# Patient Record
Sex: MALE | Race: WHITE | Employment: UNEMPLOYED | URBAN - METROPOLITAN AREA
[De-identification: names, ages, dates, MRNs, and addresses within clinical notes are randomized per-mention and may not be internally consistent; named-entity substitution may affect disease eponyms.]

---

## 2018-09-26 ENCOUNTER — OFFICE VISIT (OUTPATIENT)
Dept: FAMILY MEDICINE CLINIC | Age: 38
End: 2018-09-26
Payer: COMMERCIAL

## 2018-09-26 VITALS
DIASTOLIC BLOOD PRESSURE: 94 MMHG | HEART RATE: 108 BPM | TEMPERATURE: 98.4 F | OXYGEN SATURATION: 99 % | BODY MASS INDEX: 27.86 KG/M2 | WEIGHT: 194.6 LBS | SYSTOLIC BLOOD PRESSURE: 142 MMHG | HEIGHT: 70 IN

## 2018-09-26 DIAGNOSIS — G89.29 CHRONIC MIDLINE LOW BACK PAIN, WITH SCIATICA PRESENCE UNSPECIFIED: Primary | ICD-10-CM

## 2018-09-26 DIAGNOSIS — M54.5 CHRONIC MIDLINE LOW BACK PAIN, WITH SCIATICA PRESENCE UNSPECIFIED: Primary | ICD-10-CM

## 2018-09-26 DIAGNOSIS — R10.30 LOWER ABDOMINAL PAIN: ICD-10-CM

## 2018-09-26 DIAGNOSIS — Z20.2 STD EXPOSURE: ICD-10-CM

## 2018-09-26 LAB
A/G RATIO: 2 (ref 1.1–2.2)
ALBUMIN SERPL-MCNC: 4.9 G/DL (ref 3.4–5)
ALP BLD-CCNC: 85 U/L (ref 40–129)
ALT SERPL-CCNC: 40 U/L (ref 10–40)
ANION GAP SERPL CALCULATED.3IONS-SCNC: 13 MMOL/L (ref 3–16)
AST SERPL-CCNC: 27 U/L (ref 15–37)
BASOPHILS ABSOLUTE: 0 K/UL (ref 0–0.2)
BASOPHILS RELATIVE PERCENT: 0.3 %
BILIRUB SERPL-MCNC: 0.5 MG/DL (ref 0–1)
BUN BLDV-MCNC: 11 MG/DL (ref 7–20)
CALCIUM SERPL-MCNC: 10.1 MG/DL (ref 8.3–10.6)
CHLORIDE BLD-SCNC: 100 MMOL/L (ref 99–110)
CO2: 27 MMOL/L (ref 21–32)
CREAT SERPL-MCNC: 0.9 MG/DL (ref 0.9–1.3)
EOSINOPHILS ABSOLUTE: 0.2 K/UL (ref 0–0.6)
EOSINOPHILS RELATIVE PERCENT: 2.4 %
GFR AFRICAN AMERICAN: >60
GFR NON-AFRICAN AMERICAN: >60
GLOBULIN: 2.5 G/DL
GLUCOSE BLD-MCNC: 130 MG/DL (ref 70–99)
HCT VFR BLD CALC: 45.2 % (ref 40.5–52.5)
HEMOGLOBIN: 15.1 G/DL (ref 13.5–17.5)
HEPATITIS C ANTIBODY INTERPRETATION: NORMAL
LYMPHOCYTES ABSOLUTE: 1 K/UL (ref 1–5.1)
LYMPHOCYTES RELATIVE PERCENT: 12.5 %
MCH RBC QN AUTO: 31.6 PG (ref 26–34)
MCHC RBC AUTO-ENTMCNC: 33.5 G/DL (ref 31–36)
MCV RBC AUTO: 94.1 FL (ref 80–100)
MONOCYTES ABSOLUTE: 0.2 K/UL (ref 0–1.3)
MONOCYTES RELATIVE PERCENT: 2.5 %
NEUTROPHILS ABSOLUTE: 6.9 K/UL (ref 1.7–7.7)
NEUTROPHILS RELATIVE PERCENT: 82.3 %
PDW BLD-RTO: 12.7 % (ref 12.4–15.4)
PLATELET # BLD: 411 K/UL (ref 135–450)
PMV BLD AUTO: 7.3 FL (ref 5–10.5)
POTASSIUM SERPL-SCNC: 5 MMOL/L (ref 3.5–5.1)
RBC # BLD: 4.8 M/UL (ref 4.2–5.9)
SODIUM BLD-SCNC: 140 MMOL/L (ref 136–145)
TOTAL PROTEIN: 7.4 G/DL (ref 6.4–8.2)
TSH REFLEX: 0.49 UIU/ML (ref 0.27–4.2)
WBC # BLD: 8.3 K/UL (ref 4–11)

## 2018-09-26 PROCEDURE — G8419 CALC BMI OUT NRM PARAM NOF/U: HCPCS | Performed by: FAMILY MEDICINE

## 2018-09-26 PROCEDURE — 99203 OFFICE O/P NEW LOW 30 MIN: CPT | Performed by: FAMILY MEDICINE

## 2018-09-26 PROCEDURE — 4004F PT TOBACCO SCREEN RCVD TLK: CPT | Performed by: FAMILY MEDICINE

## 2018-09-26 PROCEDURE — 36415 COLL VENOUS BLD VENIPUNCTURE: CPT | Performed by: FAMILY MEDICINE

## 2018-09-26 PROCEDURE — G8427 DOCREV CUR MEDS BY ELIG CLIN: HCPCS | Performed by: FAMILY MEDICINE

## 2018-09-26 RX ORDER — ALBUTEROL SULFATE 90 UG/1
2 AEROSOL, METERED RESPIRATORY (INHALATION) EVERY 6 HOURS PRN
Qty: 1 INHALER | Refills: 3 | Status: SHIPPED | OUTPATIENT
Start: 2018-09-26 | End: 2019-01-05

## 2018-09-26 ASSESSMENT — PATIENT HEALTH QUESTIONNAIRE - PHQ9
2. FEELING DOWN, DEPRESSED OR HOPELESS: 0
SUM OF ALL RESPONSES TO PHQ9 QUESTIONS 1 & 2: 0
1. LITTLE INTEREST OR PLEASURE IN DOING THINGS: 0
SUM OF ALL RESPONSES TO PHQ QUESTIONS 1-9: 0
SUM OF ALL RESPONSES TO PHQ QUESTIONS 1-9: 0

## 2018-09-26 ASSESSMENT — ENCOUNTER SYMPTOMS: BACK PAIN: 1

## 2018-09-26 NOTE — PROGRESS NOTES
0     No current facility-administered medications for this visit. Allergies: Patient has no known allergies. Past Medical History:   Diagnosis Date    Asthma     Back injury     Hypertension      Past Surgical History:   Procedure Laterality Date    BACK SURGERY  2000    KNEE SURGERY  2001    right knee     Family History   Problem Relation Age of Onset    High Blood Pressure Mother     High Blood Pressure Father     Bipolar Disorder Father      Social History   Substance Use Topics    Smoking status: Current Some Day Smoker     Packs/day: 0.50     Years: 3.00     Types: Cigarettes    Smokeless tobacco: Never Used    Alcohol use No     Vitals:    09/26/18 1518 09/26/18 1520   BP: (!) 142/96 (!) 142/94   Pulse: 108    Temp: 98.4 °F (36.9 °C)    TempSrc: Oral    SpO2: 99%    Weight: 194 lb 9.6 oz (88.3 kg)    Height: 5' 10\" (1.778 m)      Body mass index is 27.92 kg/m². Wt Readings from Last 3 Encounters:   09/26/18 194 lb 9.6 oz (88.3 kg)   02/17/18 210 lb (95.3 kg)   09/01/17 200 lb (90.7 kg)     BP Readings from Last 3 Encounters:   09/26/18 (!) 142/94   02/17/18 129/89   09/01/17 138/87        Review of Systems   Constitutional: Negative for fatigue, fever and unexpected weight change. HENT: Negative for congestion, ear discharge, ear pain, hearing loss, rhinorrhea and sinus pressure. Eyes: Negative for pain, discharge and visual disturbance. Respiratory: Negative for cough, chest tightness, shortness of breath and wheezing. Cardiovascular: Negative for chest pain, palpitations and leg swelling. Gastrointestinal: Negative for abdominal pain, blood in stool, constipation, diarrhea and vomiting. Genitourinary: Negative for difficulty urinating, dysuria, genital sores and hematuria. Musculoskeletal: Positive for back pain and myalgias. Skin: Negative for color change and rash. Neurological: Negative for dizziness, seizures, syncope and headaches.    Psychiatric/Behavioral: Negative for dysphoric mood and sleep disturbance. The patient is not nervous/anxious. Objective:   Physical Exam   Constitutional: He is oriented to person, place, and time. He appears well-developed and well-nourished. He appears distressed (crying). HENT:   Head: Normocephalic. Right Ear: External ear normal.   Left Ear: External ear normal.   Nose: Nose normal.   Mouth/Throat: Oropharynx is clear and moist. No oropharyngeal exudate. Eyes: Pupils are equal, round, and reactive to light. Conjunctivae and EOM are normal. No scleral icterus. Neck: Neck supple. No thyromegaly present. Cardiovascular: Normal rate, regular rhythm, normal heart sounds and intact distal pulses. No murmur heard. Pulmonary/Chest: Effort normal and breath sounds normal. He has no wheezes. He has no rales. Abdominal: Soft. Bowel sounds are normal. He exhibits no distension. There is no tenderness. There is no rebound and no guarding. Musculoskeletal: Normal range of motion. He exhibits no edema, tenderness or deformity. Lymphadenopathy:     He has no cervical adenopathy. Neurological: He is alert and oriented to person, place, and time. No cranial nerve deficit. Coordination normal.   Skin: Skin is warm and dry. Psychiatric: Judgment and thought content normal. His mood appears anxious. His affect is not angry. He is agitated. He is not aggressive and not combative. He does not exhibit a depressed mood.        Assessment:      Chronic lumbar pain- xray and MRI  Genital herpes- discussed course of infection, will await to see if chronic outbreak  Lower abd pain - benign abd exam, check labs      Plan:       Orders Placed This Encounter   Procedures    XR LUMBAR SPINE (2-3 VIEWS)     Standing Status:   Future     Standing Expiration Date:   9/26/2019     Order Specific Question:   Reason for exam:     Answer:   low back pain    HIV-1 AND HIV-2 ANTIBODIES    HEPATITIS C ANTIBODY    COMPREHENSIVE METABOLIC PANEL  TSH with Reflex    CBC WITH AUTO DIFFERENTIAL    HEPATITIS B E ANTIGEN             JAKE Blanchard [address] JON, DO

## 2018-09-27 LAB
HIV AG/AB: NORMAL
HIV ANTIGEN: NORMAL
HIV-1 ANTIBODY: NORMAL
HIV-2 AB: NORMAL

## 2018-09-28 LAB — HEPATITIS BE ANTIGEN: NEGATIVE

## 2018-09-29 ASSESSMENT — ENCOUNTER SYMPTOMS
EYE PAIN: 0
WHEEZING: 0
SHORTNESS OF BREATH: 0
COLOR CHANGE: 0
DIARRHEA: 0
COUGH: 0
BLOOD IN STOOL: 0
ABDOMINAL PAIN: 0
CHEST TIGHTNESS: 0
VOMITING: 0
RHINORRHEA: 0
SINUS PRESSURE: 0
EYE DISCHARGE: 0
CONSTIPATION: 0

## 2018-10-02 ENCOUNTER — TELEPHONE (OUTPATIENT)
Dept: FAMILY MEDICINE CLINIC | Age: 38
End: 2018-10-02

## 2019-01-05 ENCOUNTER — HOSPITAL ENCOUNTER (EMERGENCY)
Age: 39
Discharge: HOME | End: 2019-01-05
Payer: COMMERCIAL

## 2019-01-05 VITALS
BODY MASS INDEX: 28 KG/M2 | TEMPERATURE: 98.7 F | SYSTOLIC BLOOD PRESSURE: 139 MMHG | HEIGHT: 71 IN | HEART RATE: 71 BPM | DIASTOLIC BLOOD PRESSURE: 80 MMHG | WEIGHT: 200 LBS | OXYGEN SATURATION: 97 % | RESPIRATION RATE: 16 BRPM

## 2019-01-05 DIAGNOSIS — M77.11 LATERAL EPICONDYLITIS, RIGHT ELBOW: ICD-10-CM

## 2019-01-05 PROCEDURE — 6370000000 HC RX 637 (ALT 250 FOR IP): Performed by: PHYSICIAN ASSISTANT

## 2019-01-05 PROCEDURE — 99282 EMERGENCY DEPT VISIT SF MDM: CPT

## 2019-01-05 RX ORDER — CEPHALEXIN 500 MG/1
500 CAPSULE ORAL 3 TIMES DAILY
Qty: 30 CAPSULE | Refills: 0 | Status: SHIPPED | OUTPATIENT
Start: 2019-01-05 | End: 2019-01-15

## 2019-01-05 RX ORDER — SULFAMETHOXAZOLE AND TRIMETHOPRIM 800; 160 MG/1; MG/1
1 TABLET ORAL 2 TIMES DAILY
Qty: 20 TABLET | Refills: 0 | Status: SHIPPED | OUTPATIENT
Start: 2019-01-05 | End: 2019-01-15

## 2019-01-05 RX ORDER — SULFAMETHOXAZOLE AND TRIMETHOPRIM 800; 160 MG/1; MG/1
1 TABLET ORAL ONCE
Status: COMPLETED | OUTPATIENT
Start: 2019-01-05 | End: 2019-01-05

## 2019-01-05 RX ORDER — PREDNISONE 10 MG/1
TABLET ORAL
Qty: 18 TABLET | Refills: 0 | Status: SHIPPED | OUTPATIENT
Start: 2019-01-05 | End: 2019-01-15

## 2019-01-05 RX ORDER — CEPHALEXIN 500 MG/1
500 CAPSULE ORAL ONCE
Status: COMPLETED | OUTPATIENT
Start: 2019-01-05 | End: 2019-01-05

## 2019-01-05 RX ADMIN — CEPHALEXIN 500 MG: 500 CAPSULE ORAL at 23:24

## 2019-01-05 RX ADMIN — SULFAMETHOXAZOLE AND TRIMETHOPRIM 1 TABLET: 800; 160 TABLET ORAL at 23:24

## 2019-01-05 ASSESSMENT — PAIN DESCRIPTION - PAIN TYPE: TYPE: ACUTE PAIN

## 2019-01-05 ASSESSMENT — PAIN SCALES - GENERAL: PAINLEVEL_OUTOF10: 7

## 2019-01-05 ASSESSMENT — PAIN DESCRIPTION - LOCATION: LOCATION: ABDOMEN

## 2019-01-20 ENCOUNTER — APPOINTMENT (OUTPATIENT)
Dept: CT IMAGING | Age: 39
End: 2019-01-20
Payer: COMMERCIAL

## 2019-01-20 ENCOUNTER — HOSPITAL ENCOUNTER (EMERGENCY)
Age: 39
Discharge: HOME | End: 2019-01-21
Payer: COMMERCIAL

## 2019-01-20 DIAGNOSIS — R31.9 HEMATURIA, UNSPECIFIED TYPE: ICD-10-CM

## 2019-01-20 DIAGNOSIS — M77.11 LATERAL EPICONDYLITIS OF RIGHT ELBOW: ICD-10-CM

## 2019-01-20 DIAGNOSIS — R10.9 ABDOMINAL PAIN, UNSPECIFIED ABDOMINAL LOCATION: Primary | ICD-10-CM

## 2019-01-20 DIAGNOSIS — K59.00 CONSTIPATION, UNSPECIFIED CONSTIPATION TYPE: ICD-10-CM

## 2019-01-20 LAB
A/G RATIO: 1.4 (ref 1.1–2.2)
ALBUMIN SERPL-MCNC: 4.3 G/DL (ref 3.4–5)
ALP BLD-CCNC: 60 U/L (ref 40–129)
ALT SERPL-CCNC: 22 U/L (ref 10–40)
AMYLASE: 55 U/L (ref 25–115)
ANION GAP SERPL CALCULATED.3IONS-SCNC: 10 MMOL/L (ref 3–16)
AST SERPL-CCNC: 20 U/L (ref 15–37)
BASOPHILS ABSOLUTE: 0.1 K/UL (ref 0–0.2)
BASOPHILS RELATIVE PERCENT: 1.4 %
BILIRUB SERPL-MCNC: <0.2 MG/DL (ref 0–1)
BILIRUBIN URINE: NEGATIVE
BLOOD, URINE: ABNORMAL
BUN BLDV-MCNC: 15 MG/DL (ref 7–20)
CALCIUM SERPL-MCNC: 10 MG/DL (ref 8.3–10.6)
CASTS 2: ABNORMAL /LPF
CHLORIDE BLD-SCNC: 98 MMOL/L (ref 99–110)
CLARITY: CLEAR
CO2: 29 MMOL/L (ref 21–32)
COLOR: YELLOW
CREAT SERPL-MCNC: 1 MG/DL (ref 0.9–1.3)
CRYSTALS, UA: ABNORMAL /HPF
EOSINOPHILS ABSOLUTE: 0.3 K/UL (ref 0–0.6)
EOSINOPHILS RELATIVE PERCENT: 3.4 %
GFR AFRICAN AMERICAN: >60
GFR NON-AFRICAN AMERICAN: >60
GLOBULIN: 3 G/DL
GLUCOSE BLD-MCNC: 106 MG/DL (ref 70–99)
GLUCOSE URINE: NEGATIVE MG/DL
HCT VFR BLD CALC: 41.9 % (ref 40.5–52.5)
HEMOGLOBIN: 14.4 G/DL (ref 13.5–17.5)
KETONES, URINE: NEGATIVE MG/DL
LEUKOCYTE ESTERASE, URINE: NEGATIVE
LIPASE: 23 U/L (ref 13–60)
LYMPHOCYTES ABSOLUTE: 2.6 K/UL (ref 1–5.1)
LYMPHOCYTES RELATIVE PERCENT: 33.5 %
MCH RBC QN AUTO: 31.7 PG (ref 26–34)
MCHC RBC AUTO-ENTMCNC: 34.5 G/DL (ref 31–36)
MCV RBC AUTO: 91.8 FL (ref 80–100)
MICROSCOPIC EXAMINATION: YES
MONOCYTES ABSOLUTE: 0.6 K/UL (ref 0–1.3)
MONOCYTES RELATIVE PERCENT: 7.2 %
MUCUS: ABNORMAL /LPF
NEUTROPHILS ABSOLUTE: 4.3 K/UL (ref 1.7–7.7)
NEUTROPHILS RELATIVE PERCENT: 54.5 %
NITRITE, URINE: NEGATIVE
PDW BLD-RTO: 13 % (ref 12.4–15.4)
PH UA: 5.5
PLATELET # BLD: 320 K/UL (ref 135–450)
PMV BLD AUTO: 6.7 FL (ref 5–10.5)
POTASSIUM SERPL-SCNC: 4 MMOL/L (ref 3.5–5.1)
PROTEIN UA: NEGATIVE MG/DL
RBC # BLD: 4.56 M/UL (ref 4.2–5.9)
RBC UA: ABNORMAL /HPF (ref 0–2)
SODIUM BLD-SCNC: 137 MMOL/L (ref 136–145)
SPECIFIC GRAVITY UA: >=1.03
TOTAL PROTEIN: 7.3 G/DL (ref 6.4–8.2)
URINE TYPE: ABNORMAL
UROBILINOGEN, URINE: 0.2 E.U./DL
WBC # BLD: 7.8 K/UL (ref 4–11)
WBC UA: ABNORMAL /HPF (ref 0–5)

## 2019-01-20 PROCEDURE — 85025 COMPLETE CBC W/AUTO DIFF WBC: CPT

## 2019-01-20 PROCEDURE — 83690 ASSAY OF LIPASE: CPT

## 2019-01-20 PROCEDURE — 96374 THER/PROPH/DIAG INJ IV PUSH: CPT

## 2019-01-20 PROCEDURE — 96361 HYDRATE IV INFUSION ADD-ON: CPT

## 2019-01-20 PROCEDURE — S0028 INJECTION, FAMOTIDINE, 20 MG: HCPCS | Performed by: PHYSICIAN ASSISTANT

## 2019-01-20 PROCEDURE — 2580000003 HC RX 258: Performed by: PHYSICIAN ASSISTANT

## 2019-01-20 PROCEDURE — 87491 CHLMYD TRACH DNA AMP PROBE: CPT

## 2019-01-20 PROCEDURE — 81001 URINALYSIS AUTO W/SCOPE: CPT

## 2019-01-20 PROCEDURE — 74176 CT ABD & PELVIS W/O CONTRAST: CPT

## 2019-01-20 PROCEDURE — 82150 ASSAY OF AMYLASE: CPT

## 2019-01-20 PROCEDURE — 99284 EMERGENCY DEPT VISIT MOD MDM: CPT

## 2019-01-20 PROCEDURE — 6360000002 HC RX W HCPCS: Performed by: PHYSICIAN ASSISTANT

## 2019-01-20 PROCEDURE — 80053 COMPREHEN METABOLIC PANEL: CPT

## 2019-01-20 PROCEDURE — 87591 N.GONORRHOEAE DNA AMP PROB: CPT

## 2019-01-20 PROCEDURE — 96375 TX/PRO/DX INJ NEW DRUG ADDON: CPT

## 2019-01-20 RX ORDER — ONDANSETRON 2 MG/ML
4 INJECTION INTRAMUSCULAR; INTRAVENOUS ONCE
Status: COMPLETED | OUTPATIENT
Start: 2019-01-20 | End: 2019-01-20

## 2019-01-20 RX ORDER — KETOROLAC TROMETHAMINE 30 MG/ML
30 INJECTION, SOLUTION INTRAMUSCULAR; INTRAVENOUS ONCE
Status: COMPLETED | OUTPATIENT
Start: 2019-01-20 | End: 2019-01-20

## 2019-01-20 RX ORDER — 0.9 % SODIUM CHLORIDE 0.9 %
1000 INTRAVENOUS SOLUTION INTRAVENOUS ONCE
Status: COMPLETED | OUTPATIENT
Start: 2019-01-20 | End: 2019-01-21

## 2019-01-20 RX ADMIN — KETOROLAC TROMETHAMINE 30 MG: 30 INJECTION, SOLUTION INTRAMUSCULAR; INTRAVENOUS at 23:42

## 2019-01-20 RX ADMIN — ONDANSETRON 4 MG: 2 INJECTION INTRAMUSCULAR; INTRAVENOUS at 23:42

## 2019-01-20 RX ADMIN — FAMOTIDINE 20 MG: 10 INJECTION, SOLUTION INTRAVENOUS at 23:42

## 2019-01-20 RX ADMIN — SODIUM CHLORIDE 1000 ML: 9 INJECTION, SOLUTION INTRAVENOUS at 23:42

## 2019-01-20 ASSESSMENT — PAIN SCALES - GENERAL
PAINLEVEL_OUTOF10: 8
PAINLEVEL_OUTOF10: 8

## 2019-01-21 VITALS
OXYGEN SATURATION: 97 % | RESPIRATION RATE: 18 BRPM | WEIGHT: 195 LBS | DIASTOLIC BLOOD PRESSURE: 67 MMHG | HEART RATE: 84 BPM | HEIGHT: 71 IN | BODY MASS INDEX: 27.3 KG/M2 | TEMPERATURE: 97.1 F | SYSTOLIC BLOOD PRESSURE: 106 MMHG

## 2019-01-21 RX ORDER — POLYETHYLENE GLYCOL 3350 17 G/17G
17 POWDER, FOR SOLUTION ORAL DAILY
Qty: 1 BOTTLE | Refills: 0 | Status: SHIPPED | OUTPATIENT
Start: 2019-01-21 | End: 2019-01-26

## 2019-01-21 RX ORDER — METHYLPREDNISOLONE 4 MG/1
TABLET ORAL
Qty: 1 KIT | Refills: 0 | Status: SHIPPED | OUTPATIENT
Start: 2019-01-21 | End: 2019-04-12

## 2019-01-21 ASSESSMENT — ENCOUNTER SYMPTOMS
EYES NEGATIVE: 1
BACK PAIN: 0
CHEST TIGHTNESS: 0
ABDOMINAL PAIN: 1
ANAL BLEEDING: 0
ALLERGIC/IMMUNOLOGIC NEGATIVE: 1
BLOOD IN STOOL: 0

## 2019-01-22 LAB
C. TRACHOMATIS DNA ,URINE: NEGATIVE
N. GONORRHOEAE DNA, URINE: NEGATIVE

## 2019-01-31 ENCOUNTER — OFFICE VISIT (OUTPATIENT)
Dept: FAMILY MEDICINE CLINIC | Age: 39
End: 2019-01-31
Payer: COMMERCIAL

## 2019-01-31 VITALS
HEIGHT: 71 IN | TEMPERATURE: 97.8 F | DIASTOLIC BLOOD PRESSURE: 82 MMHG | WEIGHT: 193.6 LBS | OXYGEN SATURATION: 98 % | HEART RATE: 86 BPM | SYSTOLIC BLOOD PRESSURE: 140 MMHG | RESPIRATION RATE: 16 BRPM | BODY MASS INDEX: 27.1 KG/M2

## 2019-01-31 DIAGNOSIS — B36.9 FUNGAL DERMATITIS: Primary | ICD-10-CM

## 2019-01-31 DIAGNOSIS — L73.9 FOLLICULITIS: ICD-10-CM

## 2019-01-31 DIAGNOSIS — F19.11 HISTORY OF MIXED DRUG ABUSE (HCC): ICD-10-CM

## 2019-01-31 PROCEDURE — 4004F PT TOBACCO SCREEN RCVD TLK: CPT | Performed by: PHYSICIAN ASSISTANT

## 2019-01-31 PROCEDURE — G8427 DOCREV CUR MEDS BY ELIG CLIN: HCPCS | Performed by: PHYSICIAN ASSISTANT

## 2019-01-31 PROCEDURE — 99213 OFFICE O/P EST LOW 20 MIN: CPT | Performed by: PHYSICIAN ASSISTANT

## 2019-01-31 PROCEDURE — G8484 FLU IMMUNIZE NO ADMIN: HCPCS | Performed by: PHYSICIAN ASSISTANT

## 2019-01-31 PROCEDURE — G8419 CALC BMI OUT NRM PARAM NOF/U: HCPCS | Performed by: PHYSICIAN ASSISTANT

## 2019-01-31 RX ORDER — CLOTRIMAZOLE 1 %
CREAM (GRAM) TOPICAL 2 TIMES DAILY
Qty: 60 G | Refills: 0 | Status: SHIPPED | OUTPATIENT
Start: 2019-01-31 | End: 2019-04-12

## 2019-04-12 ENCOUNTER — APPOINTMENT (OUTPATIENT)
Dept: GENERAL RADIOLOGY | Age: 39
DRG: 052 | End: 2019-04-12
Payer: COMMERCIAL

## 2019-04-12 ENCOUNTER — HOSPITAL ENCOUNTER (EMERGENCY)
Age: 39
Discharge: HOME | DRG: 052 | End: 2019-04-13
Attending: EMERGENCY MEDICINE
Payer: COMMERCIAL

## 2019-04-12 VITALS
BODY MASS INDEX: 27.02 KG/M2 | SYSTOLIC BLOOD PRESSURE: 142 MMHG | OXYGEN SATURATION: 98 % | WEIGHT: 193 LBS | HEART RATE: 86 BPM | HEIGHT: 71 IN | RESPIRATION RATE: 18 BRPM | DIASTOLIC BLOOD PRESSURE: 123 MMHG

## 2019-04-12 DIAGNOSIS — F41.1 ANXIETY STATE: Primary | ICD-10-CM

## 2019-04-12 LAB
A/G RATIO: 1.6 (ref 1.1–2.2)
ALBUMIN SERPL-MCNC: 4.6 G/DL (ref 3.4–5)
ALP BLD-CCNC: 73 U/L (ref 40–129)
ALT SERPL-CCNC: 33 U/L (ref 10–40)
AMORPHOUS: ABNORMAL /HPF
AMPHETAMINE SCREEN, URINE: ABNORMAL
ANION GAP SERPL CALCULATED.3IONS-SCNC: 14 MMOL/L (ref 3–16)
AST SERPL-CCNC: 24 U/L (ref 15–37)
BARBITURATE SCREEN URINE: POSITIVE
BASE EXCESS VENOUS: -0.3 MMOL/L (ref -3–3)
BASOPHILS ABSOLUTE: 0.1 K/UL (ref 0–0.2)
BASOPHILS RELATIVE PERCENT: 0.8 %
BENZODIAZEPINE SCREEN, URINE: ABNORMAL
BILIRUB SERPL-MCNC: 0.5 MG/DL (ref 0–1)
BILIRUBIN URINE: NEGATIVE
BLOOD, URINE: ABNORMAL
BUN BLDV-MCNC: 14 MG/DL (ref 7–20)
CALCIUM SERPL-MCNC: 9.9 MG/DL (ref 8.3–10.6)
CANNABINOID SCREEN URINE: POSITIVE
CARBOXYHEMOGLOBIN: 3.2 % (ref 0–1.5)
CHLORIDE BLD-SCNC: 96 MMOL/L (ref 99–110)
CLARITY: CLEAR
CO2: 25 MMOL/L (ref 21–32)
COCAINE METABOLITE SCREEN URINE: ABNORMAL
COLOR: YELLOW
CREAT SERPL-MCNC: 1.2 MG/DL (ref 0.9–1.3)
EOSINOPHILS ABSOLUTE: 0.1 K/UL (ref 0–0.6)
EOSINOPHILS RELATIVE PERCENT: 1.2 %
EPITHELIAL CELLS, UA: ABNORMAL /HPF
GFR AFRICAN AMERICAN: >60
GFR NON-AFRICAN AMERICAN: >60
GLOBULIN: 2.9 G/DL
GLUCOSE BLD-MCNC: 214 MG/DL (ref 70–99)
GLUCOSE URINE: NEGATIVE MG/DL
HCO3 VENOUS: 23.6 MMOL/L (ref 23–29)
HCT VFR BLD CALC: 41.4 % (ref 40.5–52.5)
HEMOGLOBIN: 14 G/DL (ref 13.5–17.5)
KETONES, URINE: NEGATIVE MG/DL
LACTIC ACID: 1.4 MMOL/L (ref 0.4–2)
LEUKOCYTE ESTERASE, URINE: NEGATIVE
LYMPHOCYTES ABSOLUTE: 4.6 K/UL (ref 1–5.1)
LYMPHOCYTES RELATIVE PERCENT: 39 %
Lab: ABNORMAL
MCH RBC QN AUTO: 30.9 PG (ref 26–34)
MCHC RBC AUTO-ENTMCNC: 33.9 G/DL (ref 31–36)
MCV RBC AUTO: 91.1 FL (ref 80–100)
METHADONE SCREEN, URINE: ABNORMAL
METHEMOGLOBIN VENOUS: 0.3 %
MICROSCOPIC EXAMINATION: YES
MONOCYTES ABSOLUTE: 0.7 K/UL (ref 0–1.3)
MONOCYTES RELATIVE PERCENT: 6.3 %
MUCUS: ABNORMAL /LPF
NEUTROPHILS ABSOLUTE: 6.2 K/UL (ref 1.7–7.7)
NEUTROPHILS RELATIVE PERCENT: 52.7 %
NITRITE, URINE: NEGATIVE
O2 CONTENT, VEN: 18 VOL %
O2 SAT, VEN: 97 %
O2 THERAPY: ABNORMAL
OPIATE SCREEN URINE: ABNORMAL
OXYCODONE URINE: ABNORMAL
PCO2, VEN: 36.6 MMHG (ref 40–50)
PDW BLD-RTO: 13.6 % (ref 12.4–15.4)
PH UA: 5.5
PH UA: 5.5 (ref 5–8)
PH VENOUS: 7.43 (ref 7.35–7.45)
PHENCYCLIDINE SCREEN URINE: ABNORMAL
PLATELET # BLD: 354 K/UL (ref 135–450)
PMV BLD AUTO: 6.8 FL (ref 5–10.5)
PO2, VEN: 92.9 MMHG (ref 25–40)
POTASSIUM SERPL-SCNC: 3.7 MMOL/L (ref 3.5–5.1)
PROPOXYPHENE SCREEN: ABNORMAL
PROTEIN UA: 100 MG/DL
RBC # BLD: 4.54 M/UL (ref 4.2–5.9)
RBC UA: ABNORMAL /HPF (ref 0–2)
SODIUM BLD-SCNC: 135 MMOL/L (ref 136–145)
SPECIFIC GRAVITY UA: >=1.03 (ref 1–1.03)
TCO2 CALC VENOUS: 25 MMOL/L
TOTAL PROTEIN: 7.5 G/DL (ref 6.4–8.2)
URINE REFLEX TO CULTURE: ABNORMAL
URINE TYPE: ABNORMAL
UROBILINOGEN, URINE: 0.2 E.U./DL
WBC # BLD: 11.8 K/UL (ref 4–11)
WBC UA: ABNORMAL /HPF (ref 0–5)

## 2019-04-12 PROCEDURE — 99284 EMERGENCY DEPT VISIT MOD MDM: CPT

## 2019-04-12 PROCEDURE — 82803 BLOOD GASES ANY COMBINATION: CPT

## 2019-04-12 PROCEDURE — 2580000003 HC RX 258: Performed by: EMERGENCY MEDICINE

## 2019-04-12 PROCEDURE — 96375 TX/PRO/DX INJ NEW DRUG ADDON: CPT

## 2019-04-12 PROCEDURE — 83605 ASSAY OF LACTIC ACID: CPT

## 2019-04-12 PROCEDURE — 96361 HYDRATE IV INFUSION ADD-ON: CPT

## 2019-04-12 PROCEDURE — 80053 COMPREHEN METABOLIC PANEL: CPT

## 2019-04-12 PROCEDURE — 96372 THER/PROPH/DIAG INJ SC/IM: CPT

## 2019-04-12 PROCEDURE — 96374 THER/PROPH/DIAG INJ IV PUSH: CPT

## 2019-04-12 PROCEDURE — 6360000002 HC RX W HCPCS: Performed by: EMERGENCY MEDICINE

## 2019-04-12 PROCEDURE — 85025 COMPLETE CBC W/AUTO DIFF WBC: CPT

## 2019-04-12 PROCEDURE — 81001 URINALYSIS AUTO W/SCOPE: CPT

## 2019-04-12 PROCEDURE — 71045 X-RAY EXAM CHEST 1 VIEW: CPT

## 2019-04-12 PROCEDURE — 80307 DRUG TEST PRSMV CHEM ANLYZR: CPT

## 2019-04-12 RX ORDER — DIPHENHYDRAMINE HYDROCHLORIDE 50 MG/ML
50 INJECTION INTRAMUSCULAR; INTRAVENOUS ONCE
Status: COMPLETED | OUTPATIENT
Start: 2019-04-12 | End: 2019-04-12

## 2019-04-12 RX ORDER — 0.9 % SODIUM CHLORIDE 0.9 %
1000 INTRAVENOUS SOLUTION INTRAVENOUS ONCE
Status: COMPLETED | OUTPATIENT
Start: 2019-04-12 | End: 2019-04-12

## 2019-04-12 RX ORDER — HALOPERIDOL 5 MG/ML
10 INJECTION INTRAMUSCULAR ONCE
Status: COMPLETED | OUTPATIENT
Start: 2019-04-12 | End: 2019-04-12

## 2019-04-12 RX ORDER — HALOPERIDOL 5 MG/ML
10 INJECTION INTRAMUSCULAR ONCE
Status: DISCONTINUED | OUTPATIENT
Start: 2019-04-12 | End: 2019-04-12

## 2019-04-12 RX ORDER — LORAZEPAM 2 MG/ML
1 INJECTION INTRAMUSCULAR ONCE
Status: COMPLETED | OUTPATIENT
Start: 2019-04-12 | End: 2019-04-12

## 2019-04-12 RX ADMIN — LORAZEPAM 1 MG: 2 INJECTION INTRAMUSCULAR; INTRAVENOUS at 21:36

## 2019-04-12 RX ADMIN — DIPHENHYDRAMINE HYDROCHLORIDE 50 MG: 50 INJECTION, SOLUTION INTRAMUSCULAR; INTRAVENOUS at 21:36

## 2019-04-12 RX ADMIN — SODIUM CHLORIDE 1000 ML: 9 INJECTION, SOLUTION INTRAVENOUS at 21:36

## 2019-04-12 RX ADMIN — HALOPERIDOL LACTATE 10 MG: 5 INJECTION, SOLUTION INTRAMUSCULAR at 21:36

## 2019-04-13 VITALS
OXYGEN SATURATION: 96 % | RESPIRATION RATE: 15 BRPM | TEMPERATURE: 97.7 F | HEART RATE: 88 BPM | DIASTOLIC BLOOD PRESSURE: 89 MMHG | BODY MASS INDEX: 25.9 KG/M2 | WEIGHT: 185 LBS | SYSTOLIC BLOOD PRESSURE: 144 MMHG | HEIGHT: 71 IN

## 2019-04-13 DIAGNOSIS — F32.A DEPRESSION, UNSPECIFIED DEPRESSION TYPE: Primary | ICD-10-CM

## 2019-04-13 LAB
AMPHETAMINE SCREEN, URINE: ABNORMAL
BARBITURATE SCREEN URINE: ABNORMAL
BENZODIAZEPINE SCREEN, URINE: ABNORMAL
BILIRUBIN URINE: NEGATIVE
BLOOD, URINE: ABNORMAL
CANNABINOID SCREEN URINE: POSITIVE
CLARITY: CLEAR
COCAINE METABOLITE SCREEN URINE: ABNORMAL
COLOR: YELLOW
GLUCOSE URINE: NEGATIVE MG/DL
KETONES, URINE: NEGATIVE MG/DL
LEUKOCYTE ESTERASE, URINE: NEGATIVE
Lab: ABNORMAL
METHADONE SCREEN, URINE: ABNORMAL
MICROSCOPIC EXAMINATION: YES
NITRITE, URINE: NEGATIVE
OPIATE SCREEN URINE: ABNORMAL
OXYCODONE URINE: ABNORMAL
PH UA: 5.5
PH UA: 5.5 (ref 5–8)
PHENCYCLIDINE SCREEN URINE: ABNORMAL
PROPOXYPHENE SCREEN: ABNORMAL
PROTEIN UA: NEGATIVE MG/DL
RBC UA: ABNORMAL /HPF (ref 0–2)
SPECIFIC GRAVITY UA: 1.01 (ref 1–1.03)
URINE REFLEX TO CULTURE: ABNORMAL
URINE TYPE: ABNORMAL
UROBILINOGEN, URINE: 0.2 E.U./DL
WBC UA: ABNORMAL /HPF (ref 0–5)

## 2019-04-13 PROCEDURE — 99284 EMERGENCY DEPT VISIT MOD MDM: CPT

## 2019-04-13 PROCEDURE — 80307 DRUG TEST PRSMV CHEM ANLYZR: CPT

## 2019-04-13 PROCEDURE — 6370000000 HC RX 637 (ALT 250 FOR IP): Performed by: EMERGENCY MEDICINE

## 2019-04-13 PROCEDURE — 81001 URINALYSIS AUTO W/SCOPE: CPT

## 2019-04-13 RX ORDER — HYDROXYZINE 50 MG/1
50 TABLET, FILM COATED ORAL 3 TIMES DAILY PRN
Qty: 20 TABLET | Refills: 0 | Status: ON HOLD | OUTPATIENT
Start: 2019-04-13 | End: 2019-04-27 | Stop reason: HOSPADM

## 2019-04-13 RX ORDER — LORAZEPAM 1 MG/1
1 TABLET ORAL ONCE
Status: COMPLETED | OUTPATIENT
Start: 2019-04-13 | End: 2019-04-13

## 2019-04-13 RX ORDER — TEMAZEPAM 15 MG/1
15 CAPSULE ORAL NIGHTLY PRN
Qty: 7 CAPSULE | Refills: 0 | Status: ON HOLD | OUTPATIENT
Start: 2019-04-13 | End: 2019-04-27 | Stop reason: HOSPADM

## 2019-04-13 RX ADMIN — LORAZEPAM 1 MG: 1 TABLET ORAL at 16:10

## 2019-04-13 ASSESSMENT — ENCOUNTER SYMPTOMS
ABDOMINAL PAIN: 1
DIARRHEA: 0
TROUBLE SWALLOWING: 1
APNEA: 0
CHEST TIGHTNESS: 0
VOMITING: 0
COUGH: 1
EYES NEGATIVE: 1
NAUSEA: 0

## 2019-04-13 ASSESSMENT — PAIN DESCRIPTION - LOCATION: LOCATION: LEG

## 2019-04-13 ASSESSMENT — PAIN DESCRIPTION - PAIN TYPE: TYPE: ACUTE PAIN

## 2019-04-13 ASSESSMENT — PAIN DESCRIPTION - DESCRIPTORS: DESCRIPTORS: SHOOTING

## 2019-04-13 ASSESSMENT — PAIN DESCRIPTION - ORIENTATION: ORIENTATION: LEFT;RIGHT

## 2019-04-13 NOTE — ED PROVIDER NOTES
Frequency: 3.0 times per week     Types: Marijuana     Comment: 3 times per week at least    Sexual activity: Yes     Partners: Female   Lifestyle    Physical activity:     Days per week: Not on file     Minutes per session: Not on file    Stress: Not on file   Relationships    Social connections:     Talks on phone: Not on file     Gets together: Not on file     Attends Advent service: Not on file     Active member of club or organization: Not on file     Attends meetings of clubs or organizations: Not on file     Relationship status: Not on file    Intimate partner violence:     Fear of current or ex partner: Not on file     Emotionally abused: Not on file     Physically abused: Not on file     Forced sexual activity: Not on file   Other Topics Concern    Not on file   Social History Narrative    Not on file     No current facility-administered medications for this encounter. No current outpatient medications on file. No Known Allergies    Nursing Notes Reviewed    Physical Exam:  ED Triage Vitals [04/12/19 2125]   BP Temp Temp src Pulse Resp SpO2 Height Weight   (!) 151/127 -- -- 154 26 100 % 5' 11\" (1.803 m) 193 lb (87.5 kg)     GENERAL APPEARANCE: Awake and alert. Agitated. Moderate acute distress. HEAD:Normocephalic. Atraumatic. EYES: EOM's grossly intact. Sclera anicteric. ENT: Mucous membranes are moist. Tolerates saliva. No trismus. NECK: Supple. No meningismus. Trachea midline. HEART: RRR. LUNGS: Respirations unlabored. CTAB  ABDOMEN: Soft. Non-tender. No guarding or rebound. EXTREMITIES: No acute deformities. SKIN: Warm and dry. NEUROLOGICAL: No gross facial drooping. Moves all 4 extremities spontaneously.   PSYCHIATRIC: Anxiety  Physical Exam    I have reviewed and interpreted all of the currently availablelab results from this visit (if applicable):  Results for orders placed or performed during the hospital encounter of 04/12/19   CBC Auto Differential   Result Value Ref Barbiturate Screen, Ur POSITIVE (A) Negative <200 ng/mL    Benzodiazepine Screen, Urine Neg Negative <200 ng/mL    Cannabinoid Scrn, Ur POSITIVE (A) Negative <50 ng/mL    Cocaine Metabolite Screen, Urine Neg Negative <300 ng/mL    Opiate Scrn, Ur Neg Negative <300 ng/mL    PCP Screen, Urine Neg Negative <25 ng/mL    Methadone Screen, Urine Neg Negative <300 ng/mL    Propoxyphene Scrn, Ur Neg Negative <300 ng/mL    pH, UA 5.5     Drug Screen Comment: see below     Oxycodone Urine Neg Negative <100 ng/ml   Microscopic Urinalysis   Result Value Ref Range    Mucus, UA 3+ (A) /LPF    WBC, UA 0-2 0 - 5 /HPF    RBC, UA 20-50 (A) 0 - 2 /HPF    Epi Cells 0-2 /HPF    Amorphous, UA 2+ (A) /HPF   Lactic Acid, Plasma   Result Value Ref Range    Lactic Acid 1.4 0.4 - 2.0 mmol/L   Blood gas, venous   Result Value Ref Range    pH, Zeus 7.428 7.350 - 7.450    pCO2, Zeus 36.6 (L) 40.0 - 50.0 mmHg    pO2, Zeus 92.9 (H) 25.0 - 40.0 mmHg    HCO3, Venous 23.6 23.0 - 29.0 mmol/L    Base Excess, Zeus -0.3 -3.0 - 3.0 mmol/L    O2 Sat, Zeus 97 Not Established %    Carboxyhemoglobin 3.2 (H) 0.0 - 1.5 %    MetHgb, Zeus 0.3 <1.5 %    TC02 (Calc), Zeus 25 Not Established mmol/L    O2 Content, Zeus 18 Not Established VOL %    O2 Therapy Unknown         Radiographs (if obtained):  [] The following radiograph was interpreted by myself in the absence of a radiologist:  [x] Radiologist's Report Reviewed:  XR CHEST PORTABLE   Final Result   Negative chest radiograph. Procedures    MDM:  After my initial evaluation, the patient will have blood work drawn and sent. The patient is very agitated, we're going to give medications to help calm him down. Patient received his medications, and soon afterward started to feel much better. The patient was much calmer and I was able to discuss his presenting complaints. The patient's workup has shown no signs of any systemic problems that could be causing his symptoms.   The patient's medical workup was deemed to be normal, and at this point I feel he can be safely cleared from medical standpoint. The patient was evaluated by psychiatry, and they don't feel that he is going to require any admission present time, the patient started on medications to help with the symptoms at home. The patient was advised to stay away from marijuana. Clinical Impression:  1.  Anxiety state      (Please note that portions of this note Osei Shadeet been completed with a voice recognition program. Efforts were made to edit the dictations but occasionally words are mis-transcribed.)    MD Laverne Barnes MD  04/13/19 [id number]

## 2019-04-13 NOTE — ED NOTES
Presenting Problem: Anxiety    Appearance/Hygiene:  street clothes, fair grooming and fair hygiene   Motor Behavior: wnl   Attitude: cooperative  Affect: anxiety   Speech: normal pitch and normal volume  Mood: anxious   Thought Processes: [place] and Logical  Perceptions: Absent - does not appear to be responding to internal stimuli  Thought content: states he kicked in a window and believes he inhaled radha molecules which is making him anxious and sob with chest pressure, no other delusions   Suicidal ideation:  no specific plan to harm self - denies  Homicidal ideation:  None- denies  Orientation: A&Ox4   Memory: intact  Concentration: Fair    Insight/ judgement: normal insight and judgment      Psychosocial and contextual factors: lack of sleep, just left rehab a couple days ago    C-SSRS Summary (including current and past suicidal ideation, plan, intent, and attempts) : no current thoughts or past attempts of suicide    Psychiatric History: none    Patient reported diagnosis : denies    Outpatient services/ Provider: none    Previous Inpatient Admissions( including location and dates if known): none    Self-injurious/ Self-harm behavior: denies    History of violence: denies    Current Substance use: denies current use, last used Fentanyl and marijuana about 6 days ago, drug screen negative     Trauma identified: none    Access to Firearms: none    ASSESSMENT FOR IMMINENT FUTURE DANGER:      RISK FACTORS:    []  Age <25 or >55   [x]  Male gender   []  Depressed mood   []  Active suicidal ideation   []  Suicide plan   []  Suicide attempt   []  Access to lethal means   []  Prior suicide attempt   []  Active substance abuse   []  Highly impulsive behaviors   []  Not attending to self-care/ADLs    []  Recent significant loss   []  Chronic pain or medical illness   []  Social isolation   []  History of violence   []  Active psychosis   []  Cognitive impairment    [x]  No outpatient services in place   [] Medication noncompliance   []  No collateral information to support safety   [x]  Lack of sleep  PROTECTIVE FACTORS:  [x] Age >25 and <55  [] Female gender   [x] Denies depression  [x] Denies suicidal ideation  [x] Does not have lethal plan   [x] Does not have access to guns or weapons  [x] Patient is verbally letty for safety  [x] No prior suicide attempts  [x] No active substance abuse- reports been clean x 6 days drug screen negative  [x] Patient has social or family support  [x] No active psychosis or cognitive dysfunction  [x] Physically healthy  [] Has outpatient services in place  [] Compliant with recommended medications  [] Collateral information from  supports patient safety   [] Patient is future oriented with plans to get sleep and follow up with outpatient substance abuse treatment  []        Clinical Summary:    Patient presents to ED voluntarily. Patient brought self in due to anxiety and SOB. Reports he was here last night for the same thing. Reports he was in rehab at [facility] for Fentanyl withdrawal and was discharged due to insurance purposes. Reports he has not been able to sleep much. Reports he kicked in a window and inhaled rust and molecules from the window believing it to have caused SOB and chest pressure as well as anxiety. Patient reports he is fearful about his health and feels if he could just sleep for a while and be monitored he would feel better. Reports he does not feel he needs mental health treatment. Patient was brought in last night by his mother for bizarre behaviors and same thought process. Patient at that time was having same thought content about breathing in chemicals. Patient alert and oriented x 4. Unable to determine if this is a delusion. Patient has no other delusional thoughts. Patient does not appear to be responding to internal stimuli. Patient appears to be anxious. Patient shaky. Patient denies any possibility of withdrawal from alcohol or benzo's.

## 2019-04-13 NOTE — ED NOTES
Discharge instructions reviewed with Mr. Naga Mike. He verbalized understanding. Copy of discharge instructions and prescriptions given. Mr. Naga Mike was discharged to home in good condition per personal vehicle, friend/family driving. He exited the ED without difficulty.         Claudell Bard, RN  04/13/19 [id number]

## 2019-04-13 NOTE — ED NOTES
Presenting Problem: Bizarre behavior    Appearance/Hygiene:  well-appearing, street clothes, lying in bed, fair grooming and fair hygiene   Motor Behavior: WNL   Attitude: cooperative  Affect: normal affect   Speech: normal pitch and normal volume  Mood: within normal limits   Thought Processes: Illogical- believes that he inhaled \"a bunch of rust and chemicals\" that are going to kill him  Perceptions: Absent   Thought content:  delusions- believes that he inhaled \"a bunch of rust and chemicals\" that are going to kill him     Suicidal ideation:  no specific plan to harm self   Homicidal ideation:  none  Orientation: A&Ox4   Memory: intact  Concentration: Good    Insight/ judgement: impaired judgment      Psychosocial and contextual factors: Went into the garage and got exposed to Dayanara Jemison and chemicals\"    C-SSRS Summary (including current and past suicidal ideation, plan, intent, and attempts) : CURRENT: Denies  PAST: Denies    Psychiatric History: None    Patient reported diagnosis None    Outpatient services/ Provider: None    Previous Inpatient Admissions( including location and dates if known): None    Self-injurious/ Self-harm behavior: None    History of violence: None    Current Substance use: Marijuana, just discharged from detox yesterday off fentanyl     Trauma identified: None    Access to Firearms: None    ASSESSMENT FOR IMMINENT FUTURE DANGER:      RISK FACTORS:    []  Age <25 or >49   []  Male gender   []  Depressed mood   []  Active suicidal ideation   []  Suicide plan   []  Suicide attempt   []  Access to lethal means   []  Prior suicide attempt   []  Active substance abuse   []  Highly impulsive behaviors   []  Not attending to self-care/ADLs    []  Recent significant loss   []  Chronic pain or medical illness   []  Social isolation   []  History of violence   []  Active psychosis   []  Cognitive impairment    []  No outpatient services in place   []  Medication noncompliance   []  No collateral information to support safety   []  Other    PROTECTIVE FACTORS:  [] Age >25 and <55  [] Female gender   [] Denies depression  [] Denies suicidal ideation  [] Does not have lethal plan   [] Does not have access to guns or weapons  [] Patient is verbally letty for safety  [] No prior suicide attempts  [] No active substance abuse  [] Patient has social or family support  [] No active psychosis or cognitive dysfunction  [] Physically healthy  [] Has outpatient services in place  [] Compliant with recommended medications  [] Collateral information from Mother supports patient safety   [] Patient is future oriented with plans to N/A  [] Other       Clinical Summary:    Patient presents to [facility] ED voluntarily after his mother brought patient in because of his bizarre behavior. Patient was clinically sober at the time of the evaluation. Patient was evaluated and offered supportive counseling. Patient states that he doesn't want to die, and is glad that we were able to help him. States that he isn't suicidal and he isn't homicidal.  States that he just got released from detox off fentanyl. States he can be safe going home.         Wesley Dejesus RN  04/13/19 0040

## 2019-04-13 NOTE — ED NOTES
Level of Care Disposition: Discharge      Patient was seen by ED provider and [facility] staff. The case was presented to psychiatric provider on-call Dr. Amanda SANTILLAN  Based on the ED evaluation and information presented to the provider by this nurse the decision was made to discharge patient with the following referrals: substance abuse programs. RATIONALE FOR NON-ADMISSION:  The patient does not meet criteria for an involuntary psychiatric admission because is not presenting an imminent risk to self or others and has a plan to follow-up with outpatient substance abuse treatment. Patient has demonstrated a reasonable safety plan . Dr. Amanda SANTILLAN recommends to discharge with Vistaril.             Tamika Wesley RN  04/13/19 1091

## 2019-04-13 NOTE — ED PROVIDER NOTES
Triage Chief Complaint:    Depression (Pt c/o depression that started 2 years ago and got worse within the last 2 days, Pt denies thoughts of suicide at this time. Pt also c/o abscess to the left side of the back. )    [place]:  Isaias Hayes is a 45 y.o. male that presents to emergency Department with complaints of being depressed. Patient states that he is having difficulty with sleeping. Patient states that he does not want to die, but is scared to go to sleep at times as well. Patient was seen last night, and had been diagnosed with significant anxiety. Patient is 2 days out from a rehab facility, after detoxing from fentanyl. Nursing Notes Reviewed    Physical Exam:  ED Triage Vitals [04/13/19 1355]   BP Temp Temp Source Pulse Resp SpO2 Height Weight   (!) 161/112 97.7 °F (36.5 °C) Oral 119 17 97 % 5' 11\" (1.803 m) 185 lb (83.9 kg)     GENERAL APPEARANCE: Awake and alert. Anxious. No acute distress. HEAD:Normocephalic. Atraumatic. EYES: EOM's grossly intact. Sclera anicteric. ENT: Mucous membranes are moist. Tolerates saliva. No trismus. NECK: Supple. No meningismus. Trachea midline. HEART: RRR. LUNGS: Respirations unlabored. CTAB  ABDOMEN: Soft. Non-tender. No guarding or rebound. EXTREMITIES: No acute deformities. SKIN: Warm and dry. NEUROLOGICAL: No gross facial drooping. Moves all 4 extremities spontaneously. PSYCHIATRIC: Depressed and anxious.   Physical Exam    I have reviewed and interpreted all of the currently availablelab results from this visit (if applicable):  Results for orders placed or performed during the hospital encounter of 04/13/19   Urinalysis Reflex to Culture   Result Value Ref Range    Color, UA Yellow Straw/Yellow    Clarity, UA Clear Clear    Glucose, Ur Negative Negative mg/dL    Bilirubin Urine Negative Negative    Ketones, Urine Negative Negative mg/dL    Specific Gravity, UA 1.010 1.005 - 1.030    Blood, Urine SMALL (A) Negative    pH, UA 5.5 5.0 - 8.0    Protein, UA Negative Negative mg/dL    Urobilinogen, Urine 0.2 <2.0 E.U./dL    Nitrite, Urine Negative Negative    Leukocyte Esterase, Urine Negative Negative    Microscopic Examination YES     Urine Reflex to Culture Not Indicated     Urine Type Not Specified    Drug screen multi urine   Result Value Ref Range    Amphetamine Screen, Urine Neg Negative <1000ng/mL    Barbiturate Screen, Ur Neg Negative <200 ng/mL    Benzodiazepine Screen, Urine Neg Negative <200 ng/mL    Cannabinoid Scrn, Ur POSITIVE (A) Negative <50 ng/mL    Cocaine Metabolite Screen, Urine Neg Negative <300 ng/mL    Opiate Scrn, Ur Neg Negative <300 ng/mL    PCP Screen, Urine Neg Negative <25 ng/mL    Methadone Screen, Urine Neg Negative <300 ng/mL    Propoxyphene Scrn, Ur Neg Negative <300 ng/mL    pH, UA 5.5     Drug Screen Comment: see below     Oxycodone Urine Neg Negative <100 ng/ml   Microscopic Urinalysis   Result Value Ref Range    WBC, UA 0-2 0 - 5 /HPF    RBC, UA 3-5 (A) 0 - 2 /HPF        Radiographs (if obtained):  [] The following radiograph was interpreted by myself in the absence of a radiologist:  [x] Radiologist's Report Reviewed:  No orders to display       EKG (if obtained): (All EKG's are interpreted by myself in theabsence of a cardiologist)    Procedures    MDM:  After my evaluation, the patient appears much related yesterday. Patient was turned over to me awaiting results of his lab testing, and also an evaluation by psychiatry. The patient was medically cleared for evaluation by psych, and they do feel the patient is stable for discharge home. We have recommended starting patient on sleep aids to help with his symptoms. Clinical Impression:  1.  Depression, unspecified depression type      (Please note that portions of this note Theadora [place] been completed with a voice recognition program. Efforts were made to edit the dictations but occasionally words are mis-transcribed.)    MD Mariel Wilson MD  04/13/19 Shravan Chao

## 2019-04-13 NOTE — ED PROVIDER NOTES
CHIEFCOMPLAINT   Depression (Pt c/o depression that started 2 years ago and got worse within the last 2 days, Pt denies thoughts of suicide at this time. Pt also c/o abscess to the left side of the back. )      PATIENT INFORMATION  Napoleon Roach is a 45 y.o. male who presents to the ED for evaluation of inhalation injury. Patient is concerned that he is going to die from punching a window last night. Patient states \"I punched through the window and particles were released, got all over me, and I breathed them in.\" \"I don't want to die. \" \"I want to keep fighting. \"    I have reviewed the following from the nursing documentation, and I have confirmed the past medical history, medications, allergies, social history and family history with the patient.     Past Medical History:   Diagnosis Date    Asthma     Back injury     Hypertension      Past Surgical History:   Procedure Laterality Date    BACK SURGERY  2000    KNEE SURGERY  2001    right knee     Family History   Problem Relation Age of Onset    High Blood Pressure Mother     High Blood Pressure Father     Bipolar Disorder Father      Social History     Socioeconomic History    Marital status: Single     Spouse name: Not on file    Number of children: Not on file    Years of education: Not on file    Highest education level: Not on file   Occupational History    Not on file   Social Needs    Financial resource strain: Not on file    Food insecurity:     Worry: Not on file     Inability: Not on file    Transportation needs:     Medical: Not on file     Non-medical: Not on file   Tobacco Use    Smoking status: Current Some Day Smoker     Packs/day: 0.50     Years: 3.00     Pack years: 1.50     Types: Cigarettes    Smokeless tobacco: Never Used   Substance and Sexual Activity    Alcohol use: No    Drug use: Yes     Frequency: 3.0 times per week     Types: Marijuana     Comment: 3 times per week at least    Sexual activity: Yes Partners: Female   Lifestyle    Physical activity:     Days per week: Not on file     Minutes per session: Not on file    Stress: Not on file   Relationships    Social connections:     Talks on phone: Not on file     Gets together: Not on file     Attends Hindu service: Not on file     Active member of club or organization: Not on file     Attends meetings of clubs or organizations: Not on file     Relationship status: Not on file    Intimate partner violence:     Fear of current or ex partner: Not on file     Emotionally abused: Not on file     Physically abused: Not on file     Forced sexual activity: Not on file   Other Topics Concern    Not on file   Social History Narrative    Not on file     Current Facility-Administered Medications   Medication Dose Route Frequency Provider Last Rate Last Dose    LORazepam (ATIVAN) tablet 1 mg  1 mg Oral Once Maryellen San MD         No current outpatient medications on file. No Known Allergies    REVIEW OF SYSTEMS  Review of Systems   Constitutional: Positive for activity change, appetite change and fatigue. Negative for chills and fever. HENT: Positive for congestion and trouble swallowing. Eyes: Negative. Respiratory: Positive for cough. Negative for apnea and chest tightness. Cardiovascular: Negative. Gastrointestinal: Positive for abdominal pain. Negative for diarrhea, nausea and vomiting. Genitourinary: Negative. Musculoskeletal: Negative. Skin: Negative. Psychiatric/Behavioral: Positive for agitation and sleep disturbance. The patient is nervous/anxious. PHYSICAL EXAM  BP (!) 161/112   Pulse 119   Temp 97.7 °F (36.5 °C) (Oral)   Resp 17   Ht 5' 11\" (1.803 m)   Wt 185 lb (83.9 kg)   SpO2 97%   BMI 25.80 kg/m²  on room air  GENERAL APPEARANCE: Awake and alert. Anxious  HEAD: Normocephalic. Atraumatic. EYES: PERRL. EOM's grossly intact. No scleral injection or icterus.   ENT: Mucous membranes are moist.   NECK: Supple. No tracheal deviation. HEART: RRR. Slightly tachycardic. LUNGS: Respirations unlabored. CTAB. Good air exchange. Speaking comfortably in full sentences. ABDOMEN: Soft. Non-distended. Non-tender. No guarding or rebound. Normal bowel sounds. EXTREMITIES: No peripheral edema. Moves all extremities equally. All extremities neurovascularly intact. SKIN: Warm and dry. No acute rashes. NEUROLOGICAL: Alert and oriented. No gross facial drooping. Strength 5/5, sensation intact. Normal coordination. Gait is steady. PSYCHIATRIC: Anxious, nervous    LABS  I have reviewed all labs for this visit.    Results for orders placed or performed during the hospital encounter of 04/13/19   Urinalysis Reflex to Culture   Result Value Ref Range    Color, UA Yellow Straw/Yellow    Clarity, UA Clear Clear    Glucose, Ur Negative Negative mg/dL    Bilirubin Urine Negative Negative    Ketones, Urine Negative Negative mg/dL    Specific Gravity, UA 1.010 1.005 - 1.030    Blood, Urine SMALL (A) Negative    pH, UA 5.5 5.0 - 8.0    Protein, UA Negative Negative mg/dL    Urobilinogen, Urine 0.2 <2.0 E.U./dL    Nitrite, Urine Negative Negative    Leukocyte Esterase, Urine Negative Negative    Microscopic Examination YES     Urine Reflex to Culture Not Indicated     Urine Type Not Specified    Drug screen multi urine   Result Value Ref Range    Amphetamine Screen, Urine Neg Negative <1000ng/mL    Barbiturate Screen, Ur Neg Negative <200 ng/mL    Benzodiazepine Screen, Urine Neg Negative <200 ng/mL    Cannabinoid Scrn, Ur POSITIVE (A) Negative <50 ng/mL    Cocaine Metabolite Screen, Urine Neg Negative <300 ng/mL    Opiate Scrn, Ur Neg Negative <300 ng/mL    PCP Screen, Urine Neg Negative <25 ng/mL    Methadone Screen, Urine Neg Negative <300 ng/mL    Propoxyphene Scrn, Ur Neg Negative <300 ng/mL    pH, UA 5.5     Drug Screen Comment: see below     Oxycodone Urine Neg Negative <100 ng/ml   Microscopic Urinalysis   Result Value Ref Range WBC, UA 0-2 0 - 5 /HPF    RBC, UA 3-5 (A) 0 - 2 /HPF           RADIOLOGY    No results found. PROCEDURES    CONSULTATIONS    ED COURSE/MDM  Patient seen and evaluated. Discussed H&P with supervising physician, aware of results and agrees w plan/disposition. I have seen and evaluated this patient with supervising physician. Nontoxic anxious patient presenting to the ED for evaluation. Patient is concerned that he is going to die. Patient is focused on the events that happened last night where he punched a window. He states that when he punched the window particles came into the air. Patient states that these particles consisted of many different things including carbon fiber, rust, dirt, and dust.  Patient was seen here yesterday after this event and given some medications to help him calm down. He was unable to sleep last night once he went home. Patient is currently in treatment for opiate abuse. He states that he has not used heroin for fentanyl and 5 days. He does admit to smoking marijuana. Patient denies methamphetamine use. Currently patient lives with his mother and father and feels safe at home. Patient was negative for methamphetamines yesterday when he presented to the ER. Behavioral access team consultation due to patient's delusions and acute manic episode. Repeat urine drug screen was sent. Behavioral access team to evaluate patient. Patient signed out to Dr. Angely Page at 1500. Patient was given the following medications in the ED:  Medications   LORazepam (ATIVAN) tablet 1 mg (has no administration in time range)           Patient was given scripts for the following medications. I counseled patient how to take these medications. New Prescriptions    No medications on file       CLINICAL IMPRESSION  No diagnosis found. Blood pressure (!) 161/112, pulse 119, temperature 97.7 °F (36.5 °C), temperature source Oral, resp.  rate 17, height 5' 11\" (1.803 m), weight 185 lb (83.9 kg), SpO2 97 %.            Yfn Rivers, JACQUE - FRANKLIN  04/13/19 [id number]

## 2019-04-15 ENCOUNTER — HOSPITAL ENCOUNTER (INPATIENT)
Age: 39
LOS: 11 days | Discharge: HOME | DRG: 052 | End: 2019-04-27
Attending: EMERGENCY MEDICINE | Admitting: INTERNAL MEDICINE
Payer: COMMERCIAL

## 2019-04-15 DIAGNOSIS — R41.82 ALTERED MENTAL STATUS, UNSPECIFIED ALTERED MENTAL STATUS TYPE: ICD-10-CM

## 2019-04-15 DIAGNOSIS — F22 PARANOIA (HCC): ICD-10-CM

## 2019-04-15 DIAGNOSIS — R45.1 AGITATION: Primary | ICD-10-CM

## 2019-04-15 LAB
A/G RATIO: 1.6 (ref 1.1–2.2)
ACETAMINOPHEN LEVEL: <5 UG/ML (ref 10–30)
ALBUMIN SERPL-MCNC: 4.6 G/DL (ref 3.4–5)
ALP BLD-CCNC: 70 U/L (ref 40–129)
ALT SERPL-CCNC: 27 U/L (ref 10–40)
AMPHETAMINE SCREEN, URINE: ABNORMAL
ANION GAP SERPL CALCULATED.3IONS-SCNC: 14 MMOL/L (ref 3–16)
AST SERPL-CCNC: 19 U/L (ref 15–37)
BARBITURATE SCREEN URINE: ABNORMAL
BASOPHILS ABSOLUTE: 0.1 K/UL (ref 0–0.2)
BASOPHILS RELATIVE PERCENT: 0.8 %
BENZODIAZEPINE SCREEN, URINE: ABNORMAL
BILIRUB SERPL-MCNC: 0.9 MG/DL (ref 0–1)
BILIRUBIN URINE: NEGATIVE
BLOOD, URINE: ABNORMAL
BUN BLDV-MCNC: 11 MG/DL (ref 7–20)
CALCIUM SERPL-MCNC: 9.5 MG/DL (ref 8.3–10.6)
CANNABINOID SCREEN URINE: POSITIVE
CHLORIDE BLD-SCNC: 94 MMOL/L (ref 99–110)
CLARITY: CLEAR
CO2: 24 MMOL/L (ref 21–32)
COCAINE METABOLITE SCREEN URINE: ABNORMAL
COLOR: YELLOW
CREAT SERPL-MCNC: 0.8 MG/DL (ref 0.9–1.3)
EOSINOPHILS ABSOLUTE: 0 K/UL (ref 0–0.6)
EOSINOPHILS RELATIVE PERCENT: 0.5 %
ETHANOL: NORMAL MG/DL (ref 0–0.08)
GFR AFRICAN AMERICAN: >60
GFR NON-AFRICAN AMERICAN: >60
GLOBULIN: 2.8 G/DL
GLUCOSE BLD-MCNC: 165 MG/DL (ref 70–99)
GLUCOSE URINE: NEGATIVE MG/DL
HCT VFR BLD CALC: 40.5 % (ref 40.5–52.5)
HEMOGLOBIN: 14 G/DL (ref 13.5–17.5)
KETONES, URINE: NEGATIVE MG/DL
LEUKOCYTE ESTERASE, URINE: NEGATIVE
LYMPHOCYTES ABSOLUTE: 1.8 K/UL (ref 1–5.1)
LYMPHOCYTES RELATIVE PERCENT: 21.3 %
Lab: ABNORMAL
MAGNESIUM: 2.1 MG/DL (ref 1.8–2.4)
MCH RBC QN AUTO: 31.9 PG (ref 26–34)
MCHC RBC AUTO-ENTMCNC: 34.6 G/DL (ref 31–36)
MCV RBC AUTO: 92 FL (ref 80–100)
METHADONE SCREEN, URINE: ABNORMAL
MICROSCOPIC EXAMINATION: YES
MONOCYTES ABSOLUTE: 0.6 K/UL (ref 0–1.3)
MONOCYTES RELATIVE PERCENT: 7.1 %
NEUTROPHILS ABSOLUTE: 6 K/UL (ref 1.7–7.7)
NEUTROPHILS RELATIVE PERCENT: 70.3 %
NITRITE, URINE: NEGATIVE
OPIATE SCREEN URINE: ABNORMAL
OXYCODONE URINE: ABNORMAL
PDW BLD-RTO: 13.1 % (ref 12.4–15.4)
PH UA: 5.5
PH UA: 5.5 (ref 5–8)
PHENCYCLIDINE SCREEN URINE: ABNORMAL
PLATELET # BLD: 303 K/UL (ref 135–450)
PMV BLD AUTO: 6.2 FL (ref 5–10.5)
POTASSIUM REFLEX MAGNESIUM: 3.2 MMOL/L (ref 3.5–5.1)
PROPOXYPHENE SCREEN: ABNORMAL
PROTEIN UA: NEGATIVE MG/DL
RBC # BLD: 4.4 M/UL (ref 4.2–5.9)
RBC UA: ABNORMAL /HPF (ref 0–2)
SALICYLATE, SERUM: 0.4 MG/DL (ref 15–30)
SODIUM BLD-SCNC: 132 MMOL/L (ref 136–145)
SPECIFIC GRAVITY UA: <=1.005 (ref 1–1.03)
TOTAL PROTEIN: 7.4 G/DL (ref 6.4–8.2)
URINE REFLEX TO CULTURE: ABNORMAL
URINE TYPE: ABNORMAL
UROBILINOGEN, URINE: 0.2 E.U./DL
WBC # BLD: 8.6 K/UL (ref 4–11)
WBC UA: ABNORMAL /HPF (ref 0–5)

## 2019-04-15 PROCEDURE — G0480 DRUG TEST DEF 1-7 CLASSES: HCPCS

## 2019-04-15 PROCEDURE — 80307 DRUG TEST PRSMV CHEM ANLYZR: CPT

## 2019-04-15 PROCEDURE — 80053 COMPREHEN METABOLIC PANEL: CPT

## 2019-04-15 PROCEDURE — 85025 COMPLETE CBC W/AUTO DIFF WBC: CPT

## 2019-04-15 PROCEDURE — 99285 EMERGENCY DEPT VISIT HI MDM: CPT

## 2019-04-15 PROCEDURE — 83735 ASSAY OF MAGNESIUM: CPT

## 2019-04-15 PROCEDURE — 6370000000 HC RX 637 (ALT 250 FOR IP): Performed by: EMERGENCY MEDICINE

## 2019-04-15 PROCEDURE — 81001 URINALYSIS AUTO W/SCOPE: CPT

## 2019-04-15 PROCEDURE — 6370000000 HC RX 637 (ALT 250 FOR IP): Performed by: PHYSICIAN ASSISTANT

## 2019-04-15 RX ORDER — HYDROXYZINE PAMOATE 25 MG/1
25 CAPSULE ORAL ONCE
Status: DISCONTINUED | OUTPATIENT
Start: 2019-04-15 | End: 2019-04-16

## 2019-04-15 RX ORDER — OLANZAPINE 5 MG/1
5 TABLET, ORALLY DISINTEGRATING ORAL ONCE
Status: COMPLETED | OUTPATIENT
Start: 2019-04-15 | End: 2019-04-15

## 2019-04-15 RX ORDER — POTASSIUM CHLORIDE 20 MEQ/1
40 TABLET, EXTENDED RELEASE ORAL ONCE
Status: COMPLETED | OUTPATIENT
Start: 2019-04-15 | End: 2019-04-15

## 2019-04-15 RX ADMIN — POTASSIUM CHLORIDE 40 MEQ: 20 TABLET, EXTENDED RELEASE ORAL at 22:00

## 2019-04-15 RX ADMIN — OLANZAPINE 5 MG: 5 TABLET, ORALLY DISINTEGRATING ORAL at 23:40

## 2019-04-16 ENCOUNTER — APPOINTMENT (OUTPATIENT)
Dept: CT IMAGING | Age: 39
DRG: 052 | End: 2019-04-16
Payer: COMMERCIAL

## 2019-04-16 ENCOUNTER — APPOINTMENT (OUTPATIENT)
Dept: GENERAL RADIOLOGY | Age: 39
DRG: 052 | End: 2019-04-16
Payer: COMMERCIAL

## 2019-04-16 PROBLEM — G93.40 ENCEPHALOPATHY: Status: ACTIVE | Noted: 2019-04-16

## 2019-04-16 PROBLEM — R45.1 AGITATION: Status: ACTIVE | Noted: 2019-04-16

## 2019-04-16 PROBLEM — F29 PSYCHOSIS (HCC): Status: ACTIVE | Noted: 2019-04-16

## 2019-04-16 LAB
ACETAMINOPHEN LEVEL: <5 UG/ML (ref 10–30)
ANION GAP SERPL CALCULATED.3IONS-SCNC: 9 MMOL/L (ref 3–16)
BASE EXCESS ARTERIAL: -1.6 MMOL/L (ref -3–3)
BASOPHILS ABSOLUTE: 0.1 K/UL (ref 0–0.2)
BASOPHILS RELATIVE PERCENT: 0.8 %
BUN BLDV-MCNC: 12 MG/DL (ref 7–20)
CALCIUM SERPL-MCNC: 8.5 MG/DL (ref 8.3–10.6)
CARBOXYHEMOGLOBIN ARTERIAL: 0.8 % (ref 0–1.5)
CHLORIDE BLD-SCNC: 101 MMOL/L (ref 99–110)
CO2: 25 MMOL/L (ref 21–32)
CREAT SERPL-MCNC: 0.9 MG/DL (ref 0.9–1.3)
EKG ATRIAL RATE: 120 BPM
EKG DIAGNOSIS: NORMAL
EKG Q-T INTERVAL: 336 MS
EKG QRS DURATION: 88 MS
EKG QTC CALCULATION (BAZETT): 456 MS
EKG R AXIS: 74 DEGREES
EKG T AXIS: 28 DEGREES
EKG VENTRICULAR RATE: 111 BPM
EOSINOPHILS ABSOLUTE: 0.1 K/UL (ref 0–0.6)
EOSINOPHILS RELATIVE PERCENT: 0.8 %
GFR AFRICAN AMERICAN: >60
GFR NON-AFRICAN AMERICAN: >60
GLUCOSE BLD-MCNC: 84 MG/DL (ref 70–99)
GLUCOSE BLD-MCNC: 86 MG/DL (ref 70–99)
HCO3 ARTERIAL: 24.6 MMOL/L (ref 21–29)
HCT VFR BLD CALC: 41.2 % (ref 40.5–52.5)
HEMOGLOBIN, ART, EXTENDED: 12.3 G/DL (ref 13.5–17.5)
HEMOGLOBIN: 14.2 G/DL (ref 13.5–17.5)
LACTIC ACID: 0.9 MMOL/L (ref 0.4–2)
LYMPHOCYTES ABSOLUTE: 2.8 K/UL (ref 1–5.1)
LYMPHOCYTES RELATIVE PERCENT: 35.5 %
MCH RBC QN AUTO: 31.8 PG (ref 26–34)
MCHC RBC AUTO-ENTMCNC: 34.5 G/DL (ref 31–36)
MCV RBC AUTO: 92.1 FL (ref 80–100)
METHEMOGLOBIN ARTERIAL: 0.3 %
MONOCYTES ABSOLUTE: 0.7 K/UL (ref 0–1.3)
MONOCYTES RELATIVE PERCENT: 8.5 %
NEUTROPHILS ABSOLUTE: 4.2 K/UL (ref 1.7–7.7)
NEUTROPHILS RELATIVE PERCENT: 54.4 %
O2 CONTENT ARTERIAL: 18 ML/DL
O2 SAT, ARTERIAL: 99.3 %
O2 THERAPY: ABNORMAL
PCO2 ARTERIAL: 47.6 MMHG (ref 35–45)
PDW BLD-RTO: 13.3 % (ref 12.4–15.4)
PERFORMED ON: NORMAL
PH ARTERIAL: 7.33 (ref 7.35–7.45)
PLATELET # BLD: 324 K/UL (ref 135–450)
PMV BLD AUTO: 6.5 FL (ref 5–10.5)
PO2 ARTERIAL: 210.4 MMHG (ref 75–108)
POTASSIUM REFLEX MAGNESIUM: 4 MMOL/L (ref 3.5–5.1)
RBC # BLD: 4.48 M/UL (ref 4.2–5.9)
SALICYLATE, SERUM: <0.3 MG/DL (ref 15–30)
SODIUM BLD-SCNC: 135 MMOL/L (ref 136–145)
TCO2 ARTERIAL: 26.1 MMOL/L
TOTAL CK: 285 U/L (ref 39–308)
WBC # BLD: 7.8 K/UL (ref 4–11)

## 2019-04-16 PROCEDURE — 36415 COLL VENOUS BLD VENIPUNCTURE: CPT

## 2019-04-16 PROCEDURE — 6370000000 HC RX 637 (ALT 250 FOR IP): Performed by: PHYSICIAN ASSISTANT

## 2019-04-16 PROCEDURE — 2580000003 HC RX 258: Performed by: INTERNAL MEDICINE

## 2019-04-16 PROCEDURE — 0BH18EZ INSERTION OF ENDOTRACHEAL AIRWAY INTO TRACHEA, VIA NATURAL OR ARTIFICIAL OPENING ENDOSCOPIC: ICD-10-PCS | Performed by: INTERNAL MEDICINE

## 2019-04-16 PROCEDURE — G0480 DRUG TEST DEF 1-7 CLASSES: HCPCS

## 2019-04-16 PROCEDURE — 6360000002 HC RX W HCPCS: Performed by: PHYSICIAN ASSISTANT

## 2019-04-16 PROCEDURE — 6370000000 HC RX 637 (ALT 250 FOR IP): Performed by: INTERNAL MEDICINE

## 2019-04-16 PROCEDURE — 02HV33Z INSERTION OF INFUSION DEVICE INTO SUPERIOR VENA CAVA, PERCUTANEOUS APPROACH: ICD-10-PCS | Performed by: INTERNAL MEDICINE

## 2019-04-16 PROCEDURE — 94003 VENT MGMT INPAT SUBQ DAY: CPT

## 2019-04-16 PROCEDURE — 6370000000 HC RX 637 (ALT 250 FOR IP): Performed by: EMERGENCY MEDICINE

## 2019-04-16 PROCEDURE — 71046 X-RAY EXAM CHEST 2 VIEWS: CPT

## 2019-04-16 PROCEDURE — 94002 VENT MGMT INPAT INIT DAY: CPT

## 2019-04-16 PROCEDURE — 70450 CT HEAD/BRAIN W/O DYE: CPT

## 2019-04-16 PROCEDURE — 2500000003 HC RX 250 WO HCPCS: Performed by: INTERNAL MEDICINE

## 2019-04-16 PROCEDURE — 6360000002 HC RX W HCPCS

## 2019-04-16 PROCEDURE — 36556 INSERT NON-TUNNEL CV CATH: CPT | Performed by: INTERNAL MEDICINE

## 2019-04-16 PROCEDURE — 83605 ASSAY OF LACTIC ACID: CPT

## 2019-04-16 PROCEDURE — 2580000003 HC RX 258

## 2019-04-16 PROCEDURE — 93010 ELECTROCARDIOGRAM REPORT: CPT | Performed by: INTERNAL MEDICINE

## 2019-04-16 PROCEDURE — 2580000003 HC RX 258: Performed by: PHYSICIAN ASSISTANT

## 2019-04-16 PROCEDURE — 85025 COMPLETE CBC W/AUTO DIFF WBC: CPT

## 2019-04-16 PROCEDURE — 5A1955Z RESPIRATORY VENTILATION, GREATER THAN 96 CONSECUTIVE HOURS: ICD-10-PCS | Performed by: INTERNAL MEDICINE

## 2019-04-16 PROCEDURE — 6360000002 HC RX W HCPCS: Performed by: EMERGENCY MEDICINE

## 2019-04-16 PROCEDURE — 82803 BLOOD GASES ANY COMBINATION: CPT

## 2019-04-16 PROCEDURE — 71045 X-RAY EXAM CHEST 1 VIEW: CPT

## 2019-04-16 PROCEDURE — 6360000002 HC RX W HCPCS: Performed by: INTERNAL MEDICINE

## 2019-04-16 PROCEDURE — 80048 BASIC METABOLIC PNL TOTAL CA: CPT

## 2019-04-16 PROCEDURE — 31500 INSERT EMERGENCY AIRWAY: CPT

## 2019-04-16 PROCEDURE — 99291 CRITICAL CARE FIRST HOUR: CPT | Performed by: INTERNAL MEDICINE

## 2019-04-16 PROCEDURE — 99223 1ST HOSP IP/OBS HIGH 75: CPT | Performed by: INTERNAL MEDICINE

## 2019-04-16 PROCEDURE — 2000000000 HC ICU R&B

## 2019-04-16 PROCEDURE — 82550 ASSAY OF CK (CPK): CPT

## 2019-04-16 PROCEDURE — 93005 ELECTROCARDIOGRAM TRACING: CPT | Performed by: PHYSICIAN ASSISTANT

## 2019-04-16 PROCEDURE — 31500 INSERT EMERGENCY AIRWAY: CPT | Performed by: INTERNAL MEDICINE

## 2019-04-16 PROCEDURE — 99292 CRITICAL CARE ADDL 30 MIN: CPT | Performed by: INTERNAL MEDICINE

## 2019-04-16 PROCEDURE — 2500000003 HC RX 250 WO HCPCS

## 2019-04-16 RX ORDER — CHLORDIAZEPOXIDE HYDROCHLORIDE 25 MG/1
50 CAPSULE, GELATIN COATED ORAL 3 TIMES DAILY
Status: DISCONTINUED | OUTPATIENT
Start: 2019-04-16 | End: 2019-04-18

## 2019-04-16 RX ORDER — OLANZAPINE 5 MG/1
5 TABLET, ORALLY DISINTEGRATING ORAL ONCE
Status: COMPLETED | OUTPATIENT
Start: 2019-04-16 | End: 2019-04-16

## 2019-04-16 RX ORDER — DIPHENHYDRAMINE HYDROCHLORIDE 50 MG/ML
50 INJECTION INTRAMUSCULAR; INTRAVENOUS ONCE
Status: COMPLETED | OUTPATIENT
Start: 2019-04-16 | End: 2019-04-16

## 2019-04-16 RX ORDER — CLONIDINE HYDROCHLORIDE 0.1 MG/1
0.1 TABLET ORAL PRN
Status: DISCONTINUED | OUTPATIENT
Start: 2019-04-16 | End: 2019-04-25

## 2019-04-16 RX ORDER — PROPOFOL 10 MG/ML
INJECTION, EMULSION INTRAVENOUS
Status: DISPENSED
Start: 2019-04-16 | End: 2019-04-17

## 2019-04-16 RX ORDER — QUETIAPINE FUMARATE 100 MG/1
100 TABLET, FILM COATED ORAL NIGHTLY
COMMUNITY
End: 2019-12-02

## 2019-04-16 RX ORDER — MAGNESIUM SULFATE 1 G/100ML
1 INJECTION INTRAVENOUS PRN
Status: DISCONTINUED | OUTPATIENT
Start: 2019-04-16 | End: 2019-04-27 | Stop reason: HOSPADM

## 2019-04-16 RX ORDER — HALOPERIDOL 5 MG/ML
1 INJECTION INTRAMUSCULAR ONCE
Status: COMPLETED | OUTPATIENT
Start: 2019-04-16 | End: 2019-04-16

## 2019-04-16 RX ORDER — ONDANSETRON 2 MG/ML
4 INJECTION INTRAMUSCULAR; INTRAVENOUS EVERY 6 HOURS PRN
Status: DISCONTINUED | OUTPATIENT
Start: 2019-04-16 | End: 2019-04-27 | Stop reason: HOSPADM

## 2019-04-16 RX ORDER — POTASSIUM CHLORIDE 7.45 MG/ML
10 INJECTION INTRAVENOUS PRN
Status: DISCONTINUED | OUTPATIENT
Start: 2019-04-16 | End: 2019-04-27 | Stop reason: HOSPADM

## 2019-04-16 RX ORDER — SODIUM CHLORIDE 9 MG/ML
INJECTION, SOLUTION INTRAVENOUS CONTINUOUS
Status: DISCONTINUED | OUTPATIENT
Start: 2019-04-16 | End: 2019-04-22

## 2019-04-16 RX ORDER — ACETAMINOPHEN 325 MG/1
650 TABLET ORAL EVERY 4 HOURS PRN
Status: DISCONTINUED | OUTPATIENT
Start: 2019-04-16 | End: 2019-04-27 | Stop reason: HOSPADM

## 2019-04-16 RX ORDER — SODIUM CHLORIDE 0.9 % (FLUSH) 0.9 %
SYRINGE (ML) INJECTION
Status: DISCONTINUED
Start: 2019-04-16 | End: 2019-04-16

## 2019-04-16 RX ORDER — 0.9 % SODIUM CHLORIDE 0.9 %
500 INTRAVENOUS SOLUTION INTRAVENOUS ONCE
Status: COMPLETED | OUTPATIENT
Start: 2019-04-16 | End: 2019-04-16

## 2019-04-16 RX ORDER — FENTANYL CITRATE 50 UG/ML
INJECTION, SOLUTION INTRAMUSCULAR; INTRAVENOUS
Status: COMPLETED
Start: 2019-04-16 | End: 2019-04-16

## 2019-04-16 RX ORDER — DIMETHICONE, OXYBENZONE, AND PADIMATE O 2; 2.5; 6.6 G/100G; G/100G; G/100G
STICK TOPICAL PRN
Status: DISCONTINUED | OUTPATIENT
Start: 2019-04-16 | End: 2019-04-27 | Stop reason: HOSPADM

## 2019-04-16 RX ORDER — SODIUM CHLORIDE 0.9 % (FLUSH) 0.9 %
10 SYRINGE (ML) INJECTION PRN
Status: DISCONTINUED | OUTPATIENT
Start: 2019-04-16 | End: 2019-04-27 | Stop reason: HOSPADM

## 2019-04-16 RX ORDER — ZIPRASIDONE MESYLATE 20 MG/ML
INJECTION, POWDER, LYOPHILIZED, FOR SOLUTION INTRAMUSCULAR
Status: DISPENSED
Start: 2019-04-16 | End: 2019-04-17

## 2019-04-16 RX ORDER — DIPHENHYDRAMINE HYDROCHLORIDE 50 MG/ML
25 INJECTION INTRAMUSCULAR; INTRAVENOUS ONCE
Status: COMPLETED | OUTPATIENT
Start: 2019-04-16 | End: 2019-04-16

## 2019-04-16 RX ORDER — CLONIDINE HYDROCHLORIDE 0.1 MG/1
0.1 TABLET ORAL ONCE
Status: COMPLETED | OUTPATIENT
Start: 2019-04-16 | End: 2019-04-16

## 2019-04-16 RX ORDER — LORAZEPAM 2 MG/ML
INJECTION INTRAMUSCULAR
Status: DISCONTINUED
Start: 2019-04-16 | End: 2019-04-16

## 2019-04-16 RX ORDER — CYCLOBENZAPRINE HCL 10 MG
10 TABLET ORAL 3 TIMES DAILY PRN
Status: ON HOLD | COMMUNITY
End: 2019-04-27 | Stop reason: HOSPADM

## 2019-04-16 RX ORDER — MIRTAZAPINE 15 MG/1
15 TABLET, FILM COATED ORAL NIGHTLY
Status: ON HOLD | COMMUNITY
End: 2019-04-27 | Stop reason: HOSPADM

## 2019-04-16 RX ORDER — LORAZEPAM 1 MG/1
1 TABLET ORAL ONCE
Status: COMPLETED | OUTPATIENT
Start: 2019-04-16 | End: 2019-04-16

## 2019-04-16 RX ORDER — HALOPERIDOL 5 MG/ML
5 INJECTION INTRAMUSCULAR ONCE
Status: COMPLETED | OUTPATIENT
Start: 2019-04-16 | End: 2019-04-16

## 2019-04-16 RX ORDER — SODIUM CHLORIDE 9 MG/ML
INJECTION, SOLUTION INTRAVENOUS
Status: DISPENSED
Start: 2019-04-16 | End: 2019-04-17

## 2019-04-16 RX ORDER — ZIPRASIDONE MESYLATE 20 MG/ML
20 INJECTION, POWDER, LYOPHILIZED, FOR SOLUTION INTRAMUSCULAR ONCE
Status: COMPLETED | OUTPATIENT
Start: 2019-04-16 | End: 2019-04-16

## 2019-04-16 RX ORDER — LORAZEPAM 2 MG/ML
1 INJECTION INTRAMUSCULAR EVERY 4 HOURS PRN
Status: DISCONTINUED | OUTPATIENT
Start: 2019-04-16 | End: 2019-04-17

## 2019-04-16 RX ORDER — ZIPRASIDONE MESYLATE 20 MG/ML
10 INJECTION, POWDER, LYOPHILIZED, FOR SOLUTION INTRAMUSCULAR ONCE
Status: DISCONTINUED | OUTPATIENT
Start: 2019-04-16 | End: 2019-04-16

## 2019-04-16 RX ORDER — BUPRENORPHINE HYDROCHLORIDE AND NALOXONE HYDROCHLORIDE DIHYDRATE 8; 2 MG/1; MG/1
1 TABLET SUBLINGUAL ONCE
Status: COMPLETED | OUTPATIENT
Start: 2019-04-16 | End: 2019-04-16

## 2019-04-16 RX ORDER — FENTANYL CITRATE 50 UG/ML
50 INJECTION, SOLUTION INTRAMUSCULAR; INTRAVENOUS ONCE
Status: COMPLETED | OUTPATIENT
Start: 2019-04-16 | End: 2019-04-16

## 2019-04-16 RX ORDER — LORAZEPAM 2 MG/ML
2 INJECTION INTRAMUSCULAR ONCE
Status: COMPLETED | OUTPATIENT
Start: 2019-04-16 | End: 2019-04-16

## 2019-04-16 RX ORDER — POTASSIUM CHLORIDE 20 MEQ/1
40 TABLET, EXTENDED RELEASE ORAL PRN
Status: DISCONTINUED | OUTPATIENT
Start: 2019-04-16 | End: 2019-04-27 | Stop reason: HOSPADM

## 2019-04-16 RX ORDER — SODIUM CHLORIDE 0.9 % (FLUSH) 0.9 %
10 SYRINGE (ML) INJECTION EVERY 12 HOURS SCHEDULED
Status: DISCONTINUED | OUTPATIENT
Start: 2019-04-16 | End: 2019-04-27 | Stop reason: HOSPADM

## 2019-04-16 RX ORDER — TRAMADOL HYDROCHLORIDE 50 MG/1
50 TABLET ORAL PRN
Status: DISCONTINUED | OUTPATIENT
Start: 2019-04-16 | End: 2019-04-19

## 2019-04-16 RX ORDER — PROPOFOL 10 MG/ML
10 INJECTION, EMULSION INTRAVENOUS
Status: DISCONTINUED | OUTPATIENT
Start: 2019-04-16 | End: 2019-04-24

## 2019-04-16 RX ADMIN — BUPRENORPHINE HYDROCHLORIDE AND NALOXONE HYDROCHLORIDE DIHYDRATE 1 TABLET: 8; 2 TABLET SUBLINGUAL at 07:18

## 2019-04-16 RX ADMIN — HALOPERIDOL LACTATE 5 MG: 5 INJECTION, SOLUTION INTRAMUSCULAR at 14:24

## 2019-04-16 RX ADMIN — CLONIDINE HYDROCHLORIDE 0.1 MG: 0.1 TABLET ORAL at 04:18

## 2019-04-16 RX ADMIN — CLONIDINE HYDROCHLORIDE 0.1 MG: 0.1 TABLET ORAL at 12:59

## 2019-04-16 RX ADMIN — OLANZAPINE 5 MG: 5 TABLET, ORALLY DISINTEGRATING ORAL at 01:59

## 2019-04-16 RX ADMIN — THIAMINE HYDROCHLORIDE: 100 INJECTION, SOLUTION INTRAMUSCULAR; INTRAVENOUS at 21:50

## 2019-04-16 RX ADMIN — DIPHENHYDRAMINE HYDROCHLORIDE 50 MG: 50 INJECTION, SOLUTION INTRAMUSCULAR; INTRAVENOUS at 12:11

## 2019-04-16 RX ADMIN — SODIUM CHLORIDE 0.2 MCG/KG/HR: 9 INJECTION, SOLUTION INTRAVENOUS at 15:07

## 2019-04-16 RX ADMIN — PROPOFOL 50 MCG/KG/MIN: 10 INJECTION, EMULSION INTRAVENOUS at 15:43

## 2019-04-16 RX ADMIN — ENOXAPARIN SODIUM 40 MG: 40 INJECTION SUBCUTANEOUS at 18:07

## 2019-04-16 RX ADMIN — HALOPERIDOL LACTATE 1 MG: 5 INJECTION, SOLUTION INTRAMUSCULAR at 12:11

## 2019-04-16 RX ADMIN — PROPOFOL 50 MCG/KG/MIN: 10 INJECTION, EMULSION INTRAVENOUS at 21:50

## 2019-04-16 RX ADMIN — TRAMADOL HYDROCHLORIDE 50 MG: 50 TABLET, FILM COATED ORAL at 12:59

## 2019-04-16 RX ADMIN — SODIUM CHLORIDE 500 ML: 9 INJECTION, SOLUTION INTRAVENOUS at 16:47

## 2019-04-16 RX ADMIN — ZIPRASIDONE MESYLATE 20 MG: 20 INJECTION, POWDER, LYOPHILIZED, FOR SOLUTION INTRAMUSCULAR at 15:32

## 2019-04-16 RX ADMIN — CHLORDIAZEPOXIDE HYDROCHLORIDE 50 MG: 25 CAPSULE ORAL at 21:05

## 2019-04-16 RX ADMIN — DIPHENHYDRAMINE HYDROCHLORIDE 25 MG: 50 INJECTION, SOLUTION INTRAMUSCULAR; INTRAVENOUS at 14:24

## 2019-04-16 RX ADMIN — OLANZAPINE 5 MG: 5 TABLET, ORALLY DISINTEGRATING ORAL at 04:18

## 2019-04-16 RX ADMIN — LORAZEPAM 1 MG: 2 INJECTION INTRAMUSCULAR; INTRAVENOUS at 12:45

## 2019-04-16 RX ADMIN — LORAZEPAM 1 MG: 1 TABLET ORAL at 09:00

## 2019-04-16 RX ADMIN — LORAZEPAM 2 MG: 2 INJECTION INTRAMUSCULAR; INTRAVENOUS at 11:51

## 2019-04-16 RX ADMIN — SODIUM CHLORIDE: 9 INJECTION, SOLUTION INTRAVENOUS at 15:28

## 2019-04-16 RX ADMIN — LORAZEPAM 2 MG: 2 INJECTION INTRAMUSCULAR; INTRAVENOUS at 13:55

## 2019-04-16 RX ADMIN — WATER 1.2 ML: 1 INJECTION INTRAMUSCULAR; INTRAVENOUS; SUBCUTANEOUS at 15:30

## 2019-04-16 RX ADMIN — FENTANYL CITRATE 50 MCG: 50 INJECTION, SOLUTION INTRAMUSCULAR; INTRAVENOUS at 15:29

## 2019-04-16 RX ADMIN — Medication 10 ML: at 21:05

## 2019-04-16 RX ADMIN — PROPOFOL 50 MCG/KG/MIN: 10 INJECTION, EMULSION INTRAVENOUS at 18:56

## 2019-04-16 RX ADMIN — SODIUM CHLORIDE: 9 INJECTION, SOLUTION INTRAVENOUS at 17:01

## 2019-04-16 RX ADMIN — MUPIROCIN: 20 OINTMENT TOPICAL at 21:05

## 2019-04-16 ASSESSMENT — PULMONARY FUNCTION TESTS
PIF_VALUE: 15
PIF_VALUE: 16
PIF_VALUE: 18
PIF_VALUE: 18
PIF_VALUE: 16
PIF_VALUE: 17
PIF_VALUE: 17
PIF_VALUE: 20
PIF_VALUE: 16

## 2019-04-16 ASSESSMENT — PAIN SCALES - GENERAL
PAINLEVEL_OUTOF10: 10
PAINLEVEL_OUTOF10: 9
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
## 2019-04-16 NOTE — PROCEDURES
ENDOTRACHEAL INTUBATION    INDICATION: Life threatening agitation/combativeness for airway protection    TIME OUT: taken    SEDATION: Ketamine, Versed and fentanyl    PROCEDURE: Using direct laryngoscopy, the vocal cords were well visualized and an 7.5 mm endotracheal tube was place directly through the cords. Good breath sounds auscultated bilaterally without sounds over abdomen. Good return of CO2 on the monitor. CXR is pending. Procedure: central venous access, left IJ    Indication: invasive hemodynamic monitoring, frequent blood draws, ensure stable IV access    Informed Consent: was obtained from family     Time Out: taken    Procedure: Sterile prep with chlorhexidine. Full maximum sterile field/barrier technique was followed (with cap and mask and sterile gown and large sterile sheet and hand hygeine and 2% chlorhexidine). Aqueous lidocaine anesthetic. Direct ultrasound visualization of the left internal jugular vein, with placement of central venous catheter using modified seldinger technique. Good dark venous blood from all 3 lumens. CXR pending. No immediate complication.       Recommendation: remove central line at earliest time feasible to mitigate infectious risks

## 2019-04-16 NOTE — PROGRESS NOTES
Spoke to [facility] in [address] and verified that the patient's only recently filled prescriptions are flexeril 10 mg every 8 hours, seroquel 100 mg at bedtime and remeron 15 mg at bedtime. These meds were added to the patient's med list in Epic.

## 2019-04-16 NOTE — PROGRESS NOTES
Pt being physically aggressive- code violet called. 4 point restraints initiated. PRN medications given- see MAR. No positive effects at this time from previous medications given.

## 2019-04-16 NOTE — ED NOTES
Report given to [facility] in [facility] at this time, Pt moved to [address] [place] [address],[address], RN  04/15/19 0157

## 2019-04-16 NOTE — PROGRESS NOTES
Restraint 1 Hour RN assessment      Henry Manzanares was placed in 4 point leather restraints at  due to Poor safety judgment:  Forgets is unable to ambulate, tries to walk without asking for help. Currently patient is  and has reacted aggressively to being placed in restraints. Patient medical history includes       Diagnosis Date    Asthma     Back injury     Hypertension     with consideration given to circulation in regards to restraint risk. Current mental status agitated and disoriented. At this time the patient  does meet criteria for continued restraint AEB Agitated behavior. The room has been assessed for safety and potentially harmful objects. Patient has been assessed for comfort and current needs. Respirations tachy. Skin pale and clammy. Current vitals include see flow sheet  .     Most recent lab values include:   Admission on 04/15/2019   Component Date Value Ref Range Status    WBC 04/15/2019 8.6  4.0 - 11.0 K/uL Final    RBC 04/15/2019 4.40  4.20 - 5.90 M/uL Final    Hemoglobin 04/15/2019 14.0  13.5 - 17.5 g/dL Final    Hematocrit 04/15/2019 40.5  40.5 - 52.5 % Final    MCV 04/15/2019 92.0  80.0 - 100.0 fL Final    MCH 04/15/2019 31.9  26.0 - 34.0 pg Final    MCHC 04/15/2019 34.6  31.0 - 36.0 g/dL Final    RDW 04/15/2019 13.1  12.4 - 15.4 % Final    Platelets 83/04/9604 303  135 - 450 K/uL Final    MPV 04/15/2019 6.2  5.0 - 10.5 fL Final    Neutrophils % 04/15/2019 70.3  % Final    Lymphocytes % 04/15/2019 21.3  % Final    Monocytes % 04/15/2019 7.1  % Final    Eosinophils % 04/15/2019 0.5  % Final    Basophils % 04/15/2019 0.8  % Final    Neutrophils # 04/15/2019 6.0  1.7 - 7.7 K/uL Final    Lymphocytes # 04/15/2019 1.8  1.0 - 5.1 K/uL Final    Monocytes # 04/15/2019 0.6  0.0 - 1.3 K/uL Final    Eosinophils # 04/15/2019 0.0  0.0 - 0.6 K/uL Final    Basophils # 04/15/2019 0.1  0.0 - 0.2 K/uL Final    Sodium 04/15/2019 132* 136 - 145 mmol/L Final    Potassium reflex Magnesium 04/15/2019 3.2* 3.5 - 5.1 mmol/L Final    Chloride 04/15/2019 94* 99 - 110 mmol/L Final    CO2 04/15/2019 24  21 - 32 mmol/L Final    Anion Gap 04/15/2019 14  3 - 16 Final    Glucose 04/15/2019 165* 70 - 99 mg/dL Final    BUN 04/15/2019 11  7 - 20 mg/dL Final    CREATININE 04/15/2019 0.8* 0.9 - 1.3 mg/dL Final    GFR Non- 04/15/2019 >60  >60 Final    Comment: >60 mL/min/1.73m2 EGFR, calc. for ages 25 and older using the  MDRD formula (not corrected for weight), is valid for stable  renal function.  GFR  04/15/2019 >60  >60 Final    Comment: Chronic Kidney Disease: less than 60 ml/min/1.73 sq.m. Kidney Failure: less than 15 ml/min/1.73 sq.m. Results valid for patients 18 years and older.       Calcium 04/15/2019 9.5  8.3 - 10.6 mg/dL Final    Total Protein 04/15/2019 7.4  6.4 - 8.2 g/dL Final    Alb 04/15/2019 4.6  3.4 - 5.0 g/dL Final    Albumin/Globulin Ratio 04/15/2019 1.6  1.1 - 2.2 Final    Total Bilirubin 04/15/2019 0.9  0.0 - 1.0 mg/dL Final    Alkaline Phosphatase 04/15/2019 70  40 - 129 U/L Final    ALT 04/15/2019 27  10 - 40 U/L Final    AST 04/15/2019 19  15 - 37 U/L Final    Globulin 04/15/2019 2.8  g/dL Final    Acetaminophen Level 04/15/2019 <5* 10 - 30 ug/mL Final    Comment: Therapeutic Range: 10.0-30.0 ug/mL  Toxic: >=017 ug/mL      Salicylate, Serum 39/29/0389 0.4* 15.0 - 30.0 mg/dL Final    Comment: Therapeutic Range: 15.0-30.0 mg/dL  Toxic: >30.0 mg/dL      Color, UA 04/15/2019 Yellow  Straw/Yellow Final    Clarity, UA 04/15/2019 Clear  Clear Final    Glucose, Ur 04/15/2019 Negative  Negative mg/dL Final    Bilirubin Urine 04/15/2019 Negative  Negative Final    Ketones, Urine 04/15/2019 Negative  Negative mg/dL Final    Specific Gravity, UA 04/15/2019 <=1.005  1.005 - 1.030 Final    Blood, Urine 04/15/2019 SMALL* Negative Final    pH, UA 04/15/2019 5.5  5.0 - 8.0 Final    Protein, UA 04/15/2019 Negative  Negative UA 04/15/2019 3-5* 0 - 2 /HPF Final    Magnesium 04/15/2019 2.10  1.80 - 2.40 mg/dL Final    Ventricular Rate 04/16/2019 111  BPM Preliminary    Atrial Rate 04/16/2019 120  BPM Preliminary    QRS Duration 04/16/2019 88  ms Preliminary    Q-T Interval 04/16/2019 336  ms Preliminary    QTc Calculation (Bazett) 04/16/2019 456  ms Preliminary    R Axis 04/16/2019 74  degrees Preliminary    T Axis 04/16/2019 28  degrees Preliminary    Diagnosis 04/16/2019 Accelerated Junctional rhythm with retrograde conduction with Fusion complexesAbnormal ECGWhen compared with ECG of 25-JUN-2007 14:04,Junctional rhythm has replaced Sinus rhythmVent. rate has increased BY  51 BPM   Preliminary         Patient presentation and results of RN assessment has been discussed with the on call physician.

## 2019-04-16 NOTE — PROGRESS NOTES
Pt arrived to unit extremely agitated. Speech is rushed and garbled, only able to make out some words at times. Pt jumped off bed immediately when arriving to room. Telesitter obtained and clinical in room to assess patient. Order obtained for posey belt at this time. PRN ativan given- see MAR.

## 2019-04-16 NOTE — ED PROVIDER NOTES
I independently evaluated and obtained a history and physical on [facility]. All diagnostic, treatment, and disposition assistants were made to myself in conjunction the advanced practice provider. For further details of this patient's emergency department encounter, please see the advanced practice provider's documentation. History: Pt thinks that \"chemicals in my mouth are having an internal warfare\". Physician Exam: Pt speaking rapidly, delusional thought content. Not actively hallucinating. Paranoid. MDM: Discussed medical decision making with RADHA and agree with plan. Please see their note for further detail. PATIENT IS MEDICALLY CLEARED.        Carlos Jacinto MD  04/15/19 [id number]

## 2019-04-16 NOTE — ED PROVIDER NOTES
11:05 AM: I discussed the history, physical, and treatment plan with Dr. Merna Sewell- she stated she had not seen or examined the patient. Previous physician had performed his evaluation and note is in the chart. Robin Handy was signed out in stable condition. Please see Dr. Janusz Boyd note for further details. 70-year-old male with what appears to be new onset psychosis, this is his third ED visit with psych evaluation this week, seems very agitated, with tremors, denies suicidal or homicidal ideation, unclear if this is a withdrawal, because he denies any alcohol or benzo use, admits to smoking marijuana, but denies any uppers, no pain or methamphetamines, he did have barbiturates in his drug screen last week, but denies knowledge of this, concerned that his marijuana could be laced with something, but patient has become more and more agitated, requiring more medications him I spoke to Mindi CHAMPION with hospitalist service regarding admission and she accepted. CIWA went from 16 to 24 per [facility] nurse. Giving more ativan.     Orders Placed This Encounter   Procedures    XR CHEST STANDARD (2 VW)    CT Head WO Contrast    CBC auto differential    Comprehensive Metabolic Panel w/ Reflex to MG    Acetaminophen level    Salicylate    Urinalysis Reflex to Culture    Urine Drug Screen    Ethanol    Microscopic Urinalysis    Magnesium    Inpatient consult to Hospitalist     Orders Placed This Encounter   Medications    potassium chloride (KLOR-CON M) extended release tablet 40 mEq    hydrOXYzine (VISTARIL) capsule 25 mg    OLANZapine zydis (ZYPREXA) disintegrating tablet 5 mg    OLANZapine zydis (ZYPREXA) disintegrating tablet 5 mg    cloNIDine (CATAPRES) tablet 0.1 mg    OLANZapine zydis (ZYPREXA) disintegrating tablet 5 mg    buprenorphine-naloxone (SUBOXONE) 8-2 MG SL tablet 1 tablet    LORazepam (ATIVAN) tablet 1 mg    medicated lip balm (BLISTEX/CARMEX) stick    LORazepam (ATIVAN) injection 2 mg   
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
## 2019-04-16 NOTE — ED PROVIDER NOTES
[facility] ICU  eMERGENCY dEPARTMENT eNCOUnter        Pt Name: John Fuentes  MRN: [id number]  Radhagferi 1980  Date of evaluation: 4/15/2019  Provider: Carol Dorman PA-C  PCP: Peter Al DO    This patient was seen and evaluated by the attending physician Dr. Comfort Almeida. CHIEF COMPLAINT       Chief Complaint   Patient presents with    Altered Mental Status     patient states that he has metal in his nose. patient states that \"it's a chemical warefare\". patient not making sense       HISTORY OF PRESENT ILLNESS   (Location/Symptom, Timing/Onset, Context/Setting, Quality, Duration, Modifying Factors, Severity)  Note limiting factors. John Fuentes is a 45 y.o. male brought in for altered mental status by his mother. Patient states that\" there are chemicals in his nose\" and that he is in the middle of a chemical warfare. He is a poor historian and unable to get a history at this time. Unable to fully evaluate at this time d/t mental state. Nursing Notes were all reviewed and agreed with or any disagreements were addressed  in the HPI. REVIEW OF SYSTEMS    (2-9 systems for level 4, 10 or more for level 5)     Review of Systems   Unable to perform ROS: Psychiatric disorder       Positives and Pertinent negatives as per HPI. Except as noted abovein the ROS, all other systems were reviewed and negative.        PAST MEDICAL HISTORY     Past Medical History:   Diagnosis Date    Asthma     Back injury     Hypertension          SURGICAL HISTORY     Past Surgical History:   Procedure Laterality Date    BACK SURGERY  2000    KNEE SURGERY  2001    right knee         CURRENTMEDICATIONS       Current Discharge Medication List      CONTINUE these medications which have NOT CHANGED    Details   hydrOXYzine (ATARAX) 50 MG tablet Take 1 tablet by mouth 3 times daily as needed for Itching  Qty: 20 tablet, Refills: 0      temazepam (RESTORIL) 15 MG capsule Take 1 capsule by mouth nightly as needed for Sleep for up to 14 days. Qty: 7 capsule, Refills: 0    Associated Diagnoses: Depression, unspecified depression type               ALLERGIES     Patient has no known allergies.     FAMILYHISTORY       Family History   Problem Relation Age of Onset    High Blood Pressure Mother     High Blood Pressure Father     Bipolar Disorder Father           SOCIAL HISTORY       Social History     Socioeconomic History    Marital status: Single     Spouse name: None    Number of children: None    Years of education: None    Highest education level: None   Occupational History    None   Social Needs    Financial resource strain: None    Food insecurity:     Worry: None     Inability: None    Transportation needs:     Medical: None     Non-medical: None   Tobacco Use    Smoking status: Current Some Day Smoker     Packs/day: 0.50     Years: 3.00     Pack years: 1.50     Types: Cigarettes    Smokeless tobacco: Never Used   Substance and Sexual Activity    Alcohol use: No    Drug use: Yes     Frequency: 3.0 times per week     Types: Marijuana, Opiates , Methamphetamines     Comment: Heroin, Fentanyl    Sexual activity: Yes     Partners: Female   Lifestyle    Physical activity:     Days per week: None     Minutes per session: None    Stress: None   Relationships    Social connections:     Talks on phone: None     Gets together: None     Attends Anabaptism service: None     Active member of club or organization: None     Attends meetings of clubs or organizations: None     Relationship status: None    Intimate partner violence:     Fear of current or ex partner: None     Emotionally abused: None     Physically abused: None     Forced sexual activity: None   Other Topics Concern    None   Social History Narrative    None       SCREENINGS    [place] Coma Scale  Eye Opening: Spontaneous  Best Verbal Response: Oriented  Best Motor Response: Obeys commands  [place] Coma Scale Score: 15        PHYSICAL EXAM    (up to (*)     All other components within normal limits    Narrative:     Performed at:  [facility]) - [address]  [facility]   Phone [phone]   URINE RT REFLEX TO CULTURE - Abnormal; Notable for the following components:    Blood, Urine SMALL (*)     All other components within normal limits    Narrative:     Performed at:  [place] [address],  [facility]   Phone [phone]   [address] - Abnormal; Notable for the following components:    Cannabinoid Scrn, Ur POSITIVE (*)     All other components within normal limits    Narrative:     Performed at:  [address]  [facility]   Phone [phone]   MICROSCOPIC URINALYSIS - Abnormal; Notable for the following components:    RBC, UA 3-5 (*)     All other components within normal limits    Narrative:     Performed at:  [address]  [facility]   Phone [phone]   CBC WITH AUTO DIFFERENTIAL    Narrative:     Performed at:  [address]  [facility]   Phone [phone]   ETHANOL    Narrative:     Performed at:  [facility]) - [place] [address],  [facility]   Phone [phone]   MAGNESIUM    Narrative:     Performed at:  [address]  [facility]   Phone [phone]   CBC WITH AUTO DIFFERENTIAL    Narrative:     Performed at:  [place] [address],  [facility]   Phone [phone]   BASIC METABOLIC PANEL W/ REFLEX TO MG FOR LOW K   LACTIC ACID, PLASMA   CK       All other labs were within normal range or not returned as of this dictation. EKG:  All EKG's are interpreted by the Emergency Department Physician who either signs orCo-signs this chart in the absence of a cardiologist.  Please see their note for interpretation of EKG. RADIOLOGY:   Non-plain film images such as CT, Ultrasound and MRI are read by the radiologist. Plain radiographic images are visualized andpreliminarily interpreted by the  ED Provider with the below findings:        Interpretation perthe Radiologist below, if available at the time of this note:    XR CHEST STANDARD (2 VW)   Final Result   No acute cardiopulmonary process. CT Head WO Contrast   Final Result   No acute intracranial abnormality or significant interval change from prior   study 10/01/2016. No results found.       PROCEDURES   Unless otherwise noted below, none     Procedures    CRITICAL CARE TIME   N/A    CONSULTS:  IP CONSULT TO HOSPITALIST  IP CONSULT TO PSYCHIATRY  IP CONSULT TO CRITICAL CARE      EMERGENCY DEPARTMENT COURSE and DIFFERENTIALDIAGNOSIS/MDM:   Vitals:    Vitals:    04/16/19 1242 04/16/19 1450 04/16/19 1500 04/16/19 1549   BP: (!) 144/85 (!) 137/109     Pulse: 112 124 131 97   Resp: 20 28 30 10   Temp: 97.9 °F (36.6 °C) 99.8 °F (37.7 °C)     TempSrc: Oral Axillary     SpO2: 96% 95%  100%   Weight: 190 lb 1.6 oz (86.2 kg)      Height: 5' 11\" (1.803 m)          Patient was given thefollowing medications:  Medications   medicated lip balm (BLISTEX/CARMEX) stick (has no administration in time range)   sodium chloride 0.9 % 157 mL with folic acid 1 mg, adult multi-vitamin with vitamin k 10 mL, thiamine 100 mg (has no administration in time range)   sodium chloride flush 0.9 % injection 10 mL (has no administration in time range)   sodium chloride flush 0.9 % injection 10 mL (has no administration in time range)   magnesium hydroxide (MILK OF MAGNESIA) 400 MG/5ML suspension 30 mL (has no administration in time range)   ondansetron (ZOFRAN) injection 4 mg (has no administration in time range)   enoxaparin (LOVENOX) injection 40 mg (has no administration in time range)   0.9 % sodium chloride bolus (has no administration in time range)   potassium chloride (KLOR-CON M) extended release tablet 40 mEq (has no administration in time range)     Or   potassium bicarb-citric acid (EFFER-K) effervescent tablet 40 mEq (has no administration in time range)     Or   potassium chloride 10 mEq/100 mL IVPB (Peripheral Line) (has no administration in time range)   magnesium sulfate 1 g in dextrose 5% 100 mL IVPB (has no administration in time range)   acetaminophen (TYLENOL) tablet 650 mg (has no administration in time range)   traMADol (ULTRAM) tablet 50 mg (50 mg Oral Given 4/16/19 1259)     And   cloNIDine (CATAPRES) tablet 0.1 mg (0.1 mg Oral Given 4/16/19 1259)   LORazepam (ATIVAN) injection 1 mg (1 mg Intravenous Given 4/16/19 1245)   mupirocin (BACTROBAN) 2 % ointment (has no administration in time range)   dexmedetomidine (PRECEDEX) 400 mcg in sodium chloride 0.9 % 100 mL infusion (1 mcg/kg/hr × 86.2 kg Intravenous Rate/Dose Change 4/16/19 1525)   ziprasidone (GEODON) 20 MG injection (has no administration in time range)   0.9 % sodium chloride infusion ( Intravenous New Bag 4/16/19 1528)   sodium chloride 0.9 % infusion (has no administration in time range)   ziprasidone (GEODON) injection 20 mg (has no administration in time range)   propofol 1000 MG/100ML injection (has no administration in time range)   propofol 1000 MG/100ML injection (has no administration in time range)   propofol 1000 MG/100ML injection (has no administration in time range)   midazolam (VERSED) 100 mg in dextrose 5 % 100 mL infusion (has no administration in time range)   potassium chloride (KLOR-CON M) extended release tablet 40 mEq (40 mEq Oral Given 4/15/19 2200)   OLANZapine zydis (ZYPREXA) disintegrating tablet 5 mg (5 mg Oral Given 4/15/19 2340)   OLANZapine zydis (ZYPREXA) disintegrating tablet 5 mg (5 mg Oral Given 4/16/19 0159)   cloNIDine (CATAPRES) tablet 0.1 mg (0.1 mg Oral Given 4/16/19 5168) OLANZapine zydis (ZYPREXA) disintegrating tablet 5 mg (5 mg Oral Given 4/16/19 0418)   buprenorphine-naloxone (SUBOXONE) 8-2 MG SL tablet 1 tablet (1 tablet Sublingual Given 4/16/19 0718)   LORazepam (ATIVAN) tablet 1 mg (1 mg Oral Given 4/16/19 0900)   LORazepam (ATIVAN) injection 2 mg (2 mg Intravenous Given 4/16/19 1151)   haloperidol lactate (HALDOL) injection 1 mg (1 mg Intravenous Given 4/16/19 1211)   diphenhydrAMINE (BENADRYL) injection 50 mg (50 mg Intravenous Given 4/16/19 1211)   LORazepam (ATIVAN) injection 2 mg (2 mg Intravenous Given 4/16/19 1355)   haloperidol lactate (HALDOL) injection 5 mg (5 mg Intramuscular Given 4/16/19 1424)   diphenhydrAMINE (BENADRYL) injection 25 mg (25 mg Intravenous Given 4/16/19 1424)   sterile water injection (1.2 mLs  Given 4/16/19 1530)   fentaNYL (SUBLIMAZE) injection 50 mcg (50 mcg Intravenous Given 4/16/19 1529)       Patient brought in by his mother for AMS. Patient unable to give any history and therefore unable to fully question patient due to mental status change. On exam patient was alert however not oriented. Patient unable to give a clear history. He is breathing comfortably and room air satting at 95%. He appeared nontoxic in no respiratory distress. Blood work was unremarkable. Urine was positive for cannabis. Patient will be evaluated by our psychiatric team at this time. Patient was given vistaril here in ED. please see our psychiatry team's note for final evaluation and disposition. FINAL IMPRESSION      1. Agitation    2. Paranoia ([address]    3.  Altered mental status, unspecified altered mental status type          DISPOSITION/PLAN   DISPOSITION Admitted 04/16/2019 11:42:56 AM      PATIENT REFERREDTO:  Toni Oropeza DO  [address]  [address]            DISCHARGE MEDICATIONS:  Current Discharge Medication List          DISCONTINUED MEDICATIONS:  Current Discharge Medication List (Please note that portions ofthis note were completed with a voice recognition program.  Efforts were made to edit the dictations but occasionally words are mis-transcribed.)    Rudi Tavarez PA-C (electronically signed)           Rudi Tavarez PA-C  04/16/19 9712

## 2019-04-16 NOTE — ED NOTES
Medicated with Suboxone 8-2mg SL tab. Patient remains with tremors, intermittent involuntary movements and anxiety. Will continue to monitor.      Nimo Ellis RN  04/16/19 5475

## 2019-04-16 NOTE — PROGRESS NOTES
Pt transferred to ICU from . He is agitated, wrestling around in the bed, in 4pt restraints, accompanied by 2W RN, Óscar Fairchild, and clinical , [facility], RN. Current VS: 125, 99%, 16, 137/109, map 118. He is belligerent, cursing, and cannot make out some of his conversations that he is having with himself. Plan for consult to Pulmonology/Critical Care and possible Precedex gtt. He has 1 PIV in place that is NS locked. Unable to follow commands or use his call light. Patient  @ bedside, Shyam Toth. Door open. ICU monitoring in place. Will continue to monitor closely.   [place] [facility]

## 2019-04-16 NOTE — PROGRESS NOTES
RT in room setting up for intubation. Pt remains combative, cursing, yelling, pulling at restraints, threatening harm to staff. Current VS:  120, 27, 160/133.  15:36:  Per vo by Dr. Rakel Castañeda, pt given 5 mg of versed IV x1 by [facility], RN.  15:36:  Per vo by Dr. Rakel Castañeda, pt given 50 mcg of fentanyl IV x1 by [facility], RN.  15:37:  106, 17, 100%, pt currently being bagged, 21.    15:40:  99, 100, 23,. Per vo by Dr. Rakel Castañeda, pt given versed 5 mg ivp x1 by [facility], RN.  15:41:  Fentanyl 50 mcg ivp x1 given by [facility], RN.    15:42:  Ketamine 200 mg iv x1 given by [facility], RN per vo by Dr. Rakel Castañeda. 15:43:  Pt intubated with a #8.0 @, 23 cm @ the teeth. VO to start a Diprivan gtt, started @ 50 mcg/kg/min. VO to give 50 mcg of fentanyl iv x1, given by Alexandre Ervin RN. VO to insert an oral airway. 15:44:  Vent settings:  AC 14, 450, 100%, +5.  15:48:  Per vo by Dr. Rakel Castañeda, pt given 20 mg of etomidate iv x1, by Alexandre Ervin RN. Current VS:  97, 100%, 14, 177/104.    15:49:  Preparing for central line placement. 15:55:  Current VS:  88, 100, 105/70.    15:56:  NS wide open per vo by Dr. Rakel Castañeda. Door open. ICU monitoring in place. Will continue to monitor closely.  [facility]

## 2019-04-16 NOTE — ED NOTES
Patient CIWA score now a 29. Patient having visual and auditory hallucinations and tremors and shaky. Patient anxious up and down and agitated. Patient pleasant and cooperative at this time. Notified Dr. Brielle Cooper.       Judy Valdes RN  04/16/19 9436

## 2019-04-16 NOTE — ED NOTES
Patient taken to main ED for medical admission. Report given to Randi Pate and charge RN.       Bill Leblanc RN  04/16/19 1 [facility], RN  04/16/19 8275

## 2019-04-16 NOTE — CONSULTS
Patient is being seen at the request of Heather Valdes MD   for a consultation for agitation    HISTORY OF PRESENT ILLNESS:   45years old with history of hypertension presented with altered mental status. Seen in ER 4/13 complaining of shards in his lungs, urine drug screen positive for barbiturates, THC, evaluated by psychiatry and discharged home. Patient returned 4/15 with altered mental status- complaining about mental in his nose. Patient increasingly agitated in the emergency room treated with Zyprexa, Ativan, Benadryl, Haldol. Admitted to , increasingly agitated, delusional with aggressive behavior, requiring leather restraints. Patient was transferred to the ICU for IV sedatives and closer monitoring. In ICU patient continues to be agitated, thrashing around, combative, aggressive and abusive verbally/physically towards staff, cursing and yelling. No improvement with Geodon IM, Precedex drip, fentanyl and Versed. Decision was made to intubate for airway protection and safety. Patient was intubated and placed on propofol drip- he was given fentanyl, Versed and ketamine for intubation. Left IJ central line was placed for IV axis. Her sister patient with narcotic abuse was seen recently at a clinic and given medications for detox which she ran out. Sister will bring medications bottles. PAST MEDICAL HISTORY:  Past Medical History:   Diagnosis Date    Asthma     Back injury     Hypertension     Polysubstance abuse ([address]      PAST SURGICAL HISTORY:  Past Surgical History:   Procedure Laterality Date    BACK SURGERY  2000    KNEE SURGERY  2001    right knee       FAMILY HISTORY:  family history includes Bipolar Disorder in his father; High Blood Pressure in his father and mother. SOCIAL HISTORY:   reports that he has been smoking cigarettes. He has a 1.50 pack-year smoking history.  He has never used smokeless tobacco.    Scheduled Meds:   folic acid, thiamine, multi-vitamin with vitamin K infusion   Intravenous Once    sodium chloride flush  10 mL Intravenous 2 times per day    enoxaparin  40 mg Subcutaneous Daily    sodium chloride  500 mL Intravenous Once    mupirocin   Nasal BID    ziprasidone        propofol        propofol         Continuous Infusions:   dexmedetomidine (PRECEDEX) IV infusion 1 mcg/kg/hr (04/16/19 1525)    sodium chloride 100 mL/hr at 04/16/19 1528    sodium chloride      propofol      midazolam       PRN Meds:  medicated lip balm, sodium chloride flush, magnesium hydroxide, ondansetron, potassium chloride **OR** potassium alternative oral replacement **OR** potassium chloride, magnesium sulfate, acetaminophen, traMADol **AND** cloNIDine, LORazepam    ALLERGIES:  Patient has No Known Allergies. REVIEW OF SYSTEMS: Agitated combative not able to obtain      PHYSICAL EXAM:  Blood pressure 107/67, pulse 89, temperature 99.8 °F (37.7 °C), temperature source Axillary, resp. rate 14, height 5' 11\" (1.803 m), weight 190 lb 1.6 oz (86.2 kg), SpO2 100 %.' on assist control 14/450/+5 FiO2 100%  Gen: No distress. Sedated and intubated  Eyes: PERRL. No sclera icterus. No conjunctival injection. ENT: No discharge. Pharynx clear. Neck: Trachea midline. No obvious mass. Resp: No accessory muscle use. No crackles. No wheezes. No rhonchi. No dullness on percussion. CV: Sinus tachycardia. Regular rhythm. No murmur or rub. No edema. Peripheral pulses are 2+. Capillary refill is less than 3 seconds. GI: Non-tender. Non-distended. No hernia. Skin: Warm and dry. No nodule on exposed extremities. Lymph: No cervical LAD. No supraclavicular LAD. M/S: No cyanosis. No joint deformity. No clubbing. Neuro: Sedated and intubated.     Psych: Sedated and intubated not able to obtain    LABS:  CBC:   Recent Labs     04/15/19  2034 04/16/19  1550   WBC 8.6 7.8   HGB 14.0 14.2   HCT 40.5 41.2   MCV 92.0 92.1    324     BMP:   Recent Labs physicians is 78 minutes, excluding procedures.

## 2019-04-16 NOTE — ED NOTES
Patient resting with OU closed much of the time. Intermittently up to use the toilet. Denies distress. Returned to bed and entered resting state. Continues to be monitored for safety.      Donna Sanchez RN  04/16/19 3757

## 2019-04-16 NOTE — PLAN OF CARE
Admit 2W   Aggressive behavior and delusions  Recent ED visits, no psych admit   Psych unable to perform adequate evaluation for admission   Hx of opioid abuse, reports last use days ago, COWS   ED concerns for possible associated benzo use/withdrawal? Responds well to Ativan, Ativan 1mg Iv PRN Q 4 hours   Psych consult   No SI/HI, no need for sitter or suicide precautions

## 2019-04-16 NOTE — ED NOTES
Patient up X2 since receiving Zypxa 5mg. Patient does return to bed and resumes resting state. Will continue to monitor for safety.      Navjot Viveros RN  04/16/19 5141

## 2019-04-16 NOTE — ED NOTES
Patient continues to be awake and shaky. Patient anxious and slightly agitated. Patient cooperative. Dr. Bobbi Stallings in to see patient. New orders obtained.       Jordon Tran RN  04/16/19 8081

## 2019-04-16 NOTE — H&P
Hospital Medicine History & Physical      PCP: Christi WEBB DO    Date of Admission: 4/15/2019    Date of Service: Pt seen/examined on 4/16/2019     Chief Complaint:    Chief Complaint   Patient presents with    Altered Mental Status     patient states that he has metal in his nose. patient states that \"it's a chemical warefare\". patient not making sense       History Of Present Illness: The patient is a 45 y.o. male with opiate abuse, reportedly recently discharged from rehab who presented to the ED complaining that there were \"chemicals in (his) mouth having an internal warfare. \"  He was seen in the ER on 4/13/19 complaining that there were \"shards in (his) lungs. \"  The ER provider on 4/13 mentions concern for amphetamine abuse, but UDS is negative for amphetamines (positive for barbiturates and THC only on that date). He was seen by psychiatry on that date and cleared for discharge from ER. He returned to ER on 4/15 with above complaint. UDS + only for THC now, labs unrevealing, and CT head not acute. Throughout ER stay he became increasingly agitated, treated with PO zyprexa (total of 15 mg since 2340 on 4/15), 1 SL suboxone, 3 mg of ativan, 50 mg IV benadryl, and 1 mg IV haldol. He could not be evaluated by the psychiatry team due to his mental status. He was admitted for further eval.    After arriving to the medical floor his mentation worsened with increasing agitation that did not respond to another 2 mg IV ativan. He could not be safely restrained using a posey belt. A code violet was called. IM haldol was ordered and he was transferred to ICU for further care.         Past Medical History:        Diagnosis Date    Asthma     Back injury     Hypertension        Past Surgical History:        Procedure Laterality Date    BACK SURGERY  2000    KNEE SURGERY  2001    right knee       Medications Prior to Admission:    Prior to Admission medications    Medication Sig Start Date End Date Taking? Authorizing Provider   hydrOXYzine (ATARAX) 50 MG tablet Take 1 tablet by mouth 3 times daily as needed for Itching 4/13/19 4/23/19 Yes Gabriella Kathleen MD   temazepam (RESTORIL) 15 MG capsule Take 1 capsule by mouth nightly as needed for Sleep for up to 14 days. 4/13/19 4/27/19 Yes Gabriella Kathleen MD       Allergies:  Patient has no known allergies. TOBACCO:   reports that he has been smoking cigarettes. He has a 1.50 pack-year smoking history. He has never used smokeless tobacco.  ETOH:   reports that he does not drink alcohol. Family History:   Positive as follows:        Problem Relation Age of Onset    High Blood Pressure Mother     High Blood Pressure Father     Bipolar Disorder Father        REVIEW OF SYSTEMS:     Unable to obtain      PHYSICAL EXAM:    BP (!) 144/85   Pulse 112   Temp 97.9 °F (36.6 °C) (Oral)   Resp 20   Ht 5' 11\" (1.803 m)   Wt 190 lb 1.6 oz (86.2 kg)   SpO2 96%   BMI 26.51 kg/m²     Gen: Agitated, on 4 point restraints  Eyes: PERRL. No sclera icterus. No conjunctival injection. ENT: No discharge. Pharynx clear. Neck: No JVD. No Carotid Bruit. Trachea midline. Resp: No accessory muscle use. No crackles. No wheezes. No rhonchi. CV: Regular rate. Regular rhythm. No murmur. No rub. No edema. GI: Non-tender. Non-distended. No masses. No organomegaly. Normal bowel sounds. No hernia. Skin: Warm and dry. No nodule on exposed extremities. No rash on exposed extremities. M/S: No cyanosis. No joint deformity. No clubbing.    Neuro: agitated,having hallucinations,disoriented  Moves all 4 extremities     CBC:   Recent Labs     04/15/19  2034   WBC 8.6   HGB 14.0   HCT 40.5   MCV 92.0        BMP:   Recent Labs     04/15/19  2034   *   K 3.2*   CL 94*   CO2 24   BUN 11   CREATININE 0.8*     LIVER PROFILE:   Recent Labs     04/15/19  2034   AST 19   ALT 27   BILITOT 0.9   ALKPHOS 70     PT/INR: No results for input(s): PROTIME, INR in the last 72 hours. APTT: No results for input(s): APTT in the last 72 hours. UA:  Recent Labs     04/15/19  2036   COLORU Yellow   PHUR 5.5  5.5   WBCUA None seen   RBCUA 3-5*   CLARITYU Clear   SPECGRAV <=1.005   LEUKOCYTESUR Negative   UROBILINOGEN 0.2   BILIRUBINUR Negative   BLOODU SMALL*   GLUCOSEU Negative          CARDIAC ENZYMES  No results for input(s): CKTOTAL, CKMB, CKMBINDEX, TROPONINI in the last 72 hours. U/A:    Lab Results   Component Value Date    COLORU Yellow 04/15/2019    WBCUA None seen 04/15/2019    RBCUA 3-5 04/15/2019    MUCUS 3+ 04/12/2019    CLARITYU Clear 04/15/2019    SPECGRAV <=1.005 04/15/2019    LEUKOCYTESUR Negative 04/15/2019    BLOODU SMALL 04/15/2019    GLUCOSEU Negative 04/15/2019    AMORPHOUS 2+ 04/12/2019       RADIOLOGY  XR CHEST STANDARD (2 VW)   Final Result   No acute cardiopulmonary process. CT Head WO Contrast   Final Result   No acute intracranial abnormality or significant interval change from prior   study 10/01/2016. ASSESSMENT/PLAN:    Acute encephalopathy  - suspect related to substance abuse based on patient's history, yet etiology is not entirely clear  - UDS+ THC only   - head CT without acute findings  - initially admitted to medical floor but became severely agitated, code violet called, and transferred to ICU  - Multiple sedating meds administered in ER, see MAR. Hald 5 mg IM and 25 mg IV benadryl now   - cont PRN ativan   - intensivist c/s  - psychiatry c/s      Polysubstance abuse  - UDS + THC only today  - based on EMR- patient with prior fentanyl abuse     Hypokalemia  - replaced in ER, monitor BMP     DVT Prophylaxis: Lovenox   Diet: DIET GENERAL;  Code Status: Full Code      PARUL Valle

## 2019-04-16 NOTE — ED NOTES
Resting with OU closed. No outward s/s distress noted. Continue to monitor for safety.      Elizabeth Campos RN  04/16/19 [id number]

## 2019-04-17 ENCOUNTER — APPOINTMENT (OUTPATIENT)
Dept: GENERAL RADIOLOGY | Age: 39
DRG: 052 | End: 2019-04-17
Payer: COMMERCIAL

## 2019-04-17 LAB
ANION GAP SERPL CALCULATED.3IONS-SCNC: 8 MMOL/L (ref 3–16)
BASE EXCESS ARTERIAL: -0.5 MMOL/L (ref -3–3)
BASOPHILS ABSOLUTE: 0 K/UL (ref 0–0.2)
BASOPHILS RELATIVE PERCENT: 0.7 %
BUN BLDV-MCNC: 16 MG/DL (ref 7–20)
CALCIUM SERPL-MCNC: 8.7 MG/DL (ref 8.3–10.6)
CARBOXYHEMOGLOBIN ARTERIAL: 0.5 % (ref 0–1.5)
CHLORIDE BLD-SCNC: 101 MMOL/L (ref 99–110)
CO2: 26 MMOL/L (ref 21–32)
CREAT SERPL-MCNC: 0.8 MG/DL (ref 0.9–1.3)
EOSINOPHILS ABSOLUTE: 0.1 K/UL (ref 0–0.6)
EOSINOPHILS RELATIVE PERCENT: 1.5 %
GFR AFRICAN AMERICAN: >60
GFR NON-AFRICAN AMERICAN: >60
GLUCOSE BLD-MCNC: 112 MG/DL (ref 70–99)
GLUCOSE BLD-MCNC: 67 MG/DL (ref 70–99)
GLUCOSE BLD-MCNC: 72 MG/DL (ref 70–99)
GLUCOSE BLD-MCNC: 73 MG/DL (ref 70–99)
GLUCOSE BLD-MCNC: 82 MG/DL (ref 70–99)
GLUCOSE BLD-MCNC: 94 MG/DL (ref 70–99)
HCO3 ARTERIAL: 24.9 MMOL/L (ref 21–29)
HCT VFR BLD CALC: 33.5 % (ref 40.5–52.5)
HEMOGLOBIN, ART, EXTENDED: 11.7 G/DL (ref 13.5–17.5)
HEMOGLOBIN: 11.5 G/DL (ref 13.5–17.5)
LYMPHOCYTES ABSOLUTE: 2.2 K/UL (ref 1–5.1)
LYMPHOCYTES RELATIVE PERCENT: 35.4 %
MAGNESIUM: 1.9 MG/DL (ref 1.8–2.4)
MCH RBC QN AUTO: 31.8 PG (ref 26–34)
MCHC RBC AUTO-ENTMCNC: 34.2 G/DL (ref 31–36)
MCV RBC AUTO: 93 FL (ref 80–100)
METHEMOGLOBIN ARTERIAL: 0.3 %
MONOCYTES ABSOLUTE: 0.5 K/UL (ref 0–1.3)
MONOCYTES RELATIVE PERCENT: 7.5 %
NEUTROPHILS ABSOLUTE: 3.4 K/UL (ref 1.7–7.7)
NEUTROPHILS RELATIVE PERCENT: 54.9 %
O2 CONTENT ARTERIAL: 16 ML/DL
O2 SAT, ARTERIAL: 98.3 %
O2 THERAPY: ABNORMAL
PCO2 ARTERIAL: 43.5 MMHG (ref 35–45)
PDW BLD-RTO: 13.5 % (ref 12.4–15.4)
PERFORMED ON: ABNORMAL
PERFORMED ON: ABNORMAL
PERFORMED ON: NORMAL
PH ARTERIAL: 7.38 (ref 7.35–7.45)
PHOSPHORUS: 4.1 MG/DL (ref 2.5–4.9)
PLATELET # BLD: 257 K/UL (ref 135–450)
PMV BLD AUTO: 6.3 FL (ref 5–10.5)
PO2 ARTERIAL: 119.7 MMHG (ref 75–108)
POTASSIUM REFLEX MAGNESIUM: 3.9 MMOL/L (ref 3.5–5.1)
POTASSIUM SERPL-SCNC: 3.9 MMOL/L (ref 3.5–5.1)
RBC # BLD: 3.6 M/UL (ref 4.2–5.9)
SODIUM BLD-SCNC: 135 MMOL/L (ref 136–145)
TCO2 ARTERIAL: 26.2 MMOL/L
TOTAL CK: 568 U/L (ref 39–308)
WBC # BLD: 6.2 K/UL (ref 4–11)

## 2019-04-17 PROCEDURE — 2580000003 HC RX 258: Performed by: INTERNAL MEDICINE

## 2019-04-17 PROCEDURE — 6370000000 HC RX 637 (ALT 250 FOR IP): Performed by: INTERNAL MEDICINE

## 2019-04-17 PROCEDURE — 99291 CRITICAL CARE FIRST HOUR: CPT | Performed by: INTERNAL MEDICINE

## 2019-04-17 PROCEDURE — 94003 VENT MGMT INPAT SUBQ DAY: CPT

## 2019-04-17 PROCEDURE — 82550 ASSAY OF CK (CPK): CPT

## 2019-04-17 PROCEDURE — 2000000000 HC ICU R&B

## 2019-04-17 PROCEDURE — 6360000002 HC RX W HCPCS: Performed by: INTERNAL MEDICINE

## 2019-04-17 PROCEDURE — 71045 X-RAY EXAM CHEST 1 VIEW: CPT

## 2019-04-17 PROCEDURE — 82803 BLOOD GASES ANY COMBINATION: CPT

## 2019-04-17 PROCEDURE — 83735 ASSAY OF MAGNESIUM: CPT

## 2019-04-17 PROCEDURE — 84100 ASSAY OF PHOSPHORUS: CPT

## 2019-04-17 PROCEDURE — 6360000002 HC RX W HCPCS: Performed by: PHYSICIAN ASSISTANT

## 2019-04-17 PROCEDURE — 85025 COMPLETE CBC W/AUTO DIFF WBC: CPT

## 2019-04-17 PROCEDURE — 80048 BASIC METABOLIC PNL TOTAL CA: CPT

## 2019-04-17 PROCEDURE — 99232 SBSQ HOSP IP/OBS MODERATE 35: CPT | Performed by: INTERNAL MEDICINE

## 2019-04-17 PROCEDURE — 2580000003 HC RX 258: Performed by: PHYSICIAN ASSISTANT

## 2019-04-17 PROCEDURE — 94750 HC PULMONARY COMPLIANCE STUDY: CPT

## 2019-04-17 RX ORDER — FENTANYL CITRATE 50 UG/ML
50 INJECTION, SOLUTION INTRAMUSCULAR; INTRAVENOUS ONCE
Status: COMPLETED | OUTPATIENT
Start: 2019-04-17 | End: 2019-04-18

## 2019-04-17 RX ORDER — NICOTINE POLACRILEX 4 MG
15 LOZENGE BUCCAL PRN
Status: DISCONTINUED | OUTPATIENT
Start: 2019-04-17 | End: 2019-04-27 | Stop reason: HOSPADM

## 2019-04-17 RX ORDER — FENTANYL CITRATE 50 UG/ML
50 INJECTION, SOLUTION INTRAMUSCULAR; INTRAVENOUS ONCE
Status: COMPLETED | OUTPATIENT
Start: 2019-04-17 | End: 2019-04-17

## 2019-04-17 RX ORDER — MIDAZOLAM HYDROCHLORIDE 1 MG/ML
2 INJECTION INTRAMUSCULAR; INTRAVENOUS ONCE
Status: COMPLETED | OUTPATIENT
Start: 2019-04-17 | End: 2019-04-17

## 2019-04-17 RX ORDER — DEXTROSE MONOHYDRATE 25 G/50ML
INJECTION, SOLUTION INTRAVENOUS
Status: DISPENSED
Start: 2019-04-17 | End: 2019-04-17

## 2019-04-17 RX ORDER — DEXTROSE MONOHYDRATE 25 G/50ML
12.5 INJECTION, SOLUTION INTRAVENOUS PRN
Status: DISCONTINUED | OUTPATIENT
Start: 2019-04-17 | End: 2019-04-27 | Stop reason: HOSPADM

## 2019-04-17 RX ORDER — DEXTROSE MONOHYDRATE 50 MG/ML
100 INJECTION, SOLUTION INTRAVENOUS PRN
Status: DISCONTINUED | OUTPATIENT
Start: 2019-04-17 | End: 2019-04-27 | Stop reason: HOSPADM

## 2019-04-17 RX ADMIN — SODIUM CHLORIDE: 9 INJECTION, SOLUTION INTRAVENOUS at 13:27

## 2019-04-17 RX ADMIN — MIDAZOLAM HYDROCHLORIDE 9 MG/HR: 5 INJECTION, SOLUTION INTRAMUSCULAR; INTRAVENOUS at 13:14

## 2019-04-17 RX ADMIN — PROPOFOL 50 MCG/KG/MIN: 10 INJECTION, EMULSION INTRAVENOUS at 05:11

## 2019-04-17 RX ADMIN — MIDAZOLAM HYDROCHLORIDE 2 MG: 1 INJECTION INTRAMUSCULAR; INTRAVENOUS at 21:17

## 2019-04-17 RX ADMIN — FENTANYL CITRATE 50 MCG: 50 INJECTION, SOLUTION INTRAMUSCULAR; INTRAVENOUS at 09:40

## 2019-04-17 RX ADMIN — MIDAZOLAM HYDROCHLORIDE 3 MG/HR: 5 INJECTION, SOLUTION INTRAMUSCULAR; INTRAVENOUS at 07:28

## 2019-04-17 RX ADMIN — DEXTROSE 50 % IN WATER (D50W) INTRAVENOUS SYRINGE 12.5 G: at 07:53

## 2019-04-17 RX ADMIN — PROPOFOL 45 MCG/KG/MIN: 10 INJECTION, EMULSION INTRAVENOUS at 00:59

## 2019-04-17 RX ADMIN — FENTANYL CITRATE 50 MCG: 50 INJECTION, SOLUTION INTRAMUSCULAR; INTRAVENOUS at 11:48

## 2019-04-17 RX ADMIN — ENOXAPARIN SODIUM 40 MG: 40 INJECTION SUBCUTANEOUS at 07:40

## 2019-04-17 RX ADMIN — LORAZEPAM 1 MG: 2 INJECTION INTRAMUSCULAR; INTRAVENOUS at 01:14

## 2019-04-17 RX ADMIN — PROPOFOL 50 MCG/KG/MIN: 10 INJECTION, EMULSION INTRAVENOUS at 13:29

## 2019-04-17 RX ADMIN — CHLORDIAZEPOXIDE HYDROCHLORIDE 50 MG: 25 CAPSULE ORAL at 13:13

## 2019-04-17 RX ADMIN — MIDAZOLAM HYDROCHLORIDE 1 MG/HR: 5 INJECTION, SOLUTION INTRAMUSCULAR; INTRAVENOUS at 01:17

## 2019-04-17 RX ADMIN — MUPIROCIN: 20 OINTMENT TOPICAL at 07:40

## 2019-04-17 RX ADMIN — SODIUM CHLORIDE: 9 INJECTION, SOLUTION INTRAVENOUS at 03:06

## 2019-04-17 RX ADMIN — MIDAZOLAM HYDROCHLORIDE 2 MG: 1 INJECTION INTRAMUSCULAR; INTRAVENOUS at 09:40

## 2019-04-17 RX ADMIN — SODIUM CHLORIDE: 9 INJECTION, SOLUTION INTRAVENOUS at 22:30

## 2019-04-17 RX ADMIN — LORAZEPAM 1 MG: 2 INJECTION INTRAMUSCULAR; INTRAVENOUS at 07:39

## 2019-04-17 RX ADMIN — Medication 10 ML: at 21:18

## 2019-04-17 RX ADMIN — Medication 10 ML: at 07:40

## 2019-04-17 RX ADMIN — PROPOFOL 50 MCG/KG/MIN: 10 INJECTION, EMULSION INTRAVENOUS at 09:23

## 2019-04-17 RX ADMIN — CHLORDIAZEPOXIDE HYDROCHLORIDE 50 MG: 25 CAPSULE ORAL at 21:18

## 2019-04-17 RX ADMIN — MIDAZOLAM HYDROCHLORIDE 7 MG/HR: 5 INJECTION, SOLUTION INTRAMUSCULAR; INTRAVENOUS at 10:57

## 2019-04-17 RX ADMIN — PROPOFOL 50 MCG/KG/MIN: 10 INJECTION, EMULSION INTRAVENOUS at 21:20

## 2019-04-17 RX ADMIN — FENTANYL CITRATE 25 MCG/HR: 50 INJECTION, SOLUTION INTRAMUSCULAR; INTRAVENOUS at 11:31

## 2019-04-17 RX ADMIN — MIDAZOLAM HYDROCHLORIDE 2 MG: 1 INJECTION INTRAMUSCULAR; INTRAVENOUS at 11:48

## 2019-04-17 RX ADMIN — MIDAZOLAM HYDROCHLORIDE 5 MG/HR: 5 INJECTION, SOLUTION INTRAMUSCULAR; INTRAVENOUS at 07:58

## 2019-04-17 RX ADMIN — MIDAZOLAM HYDROCHLORIDE 9 MG/HR: 5 INJECTION, SOLUTION INTRAMUSCULAR; INTRAVENOUS at 17:43

## 2019-04-17 ASSESSMENT — PAIN SCALES - GENERAL
PAINLEVEL_OUTOF10: 0
PAINLEVEL_OUTOF10: 0
PAINLEVEL_OUTOF10: 6
PAINLEVEL_OUTOF10: 6

## 2019-04-17 ASSESSMENT — PULMONARY FUNCTION TESTS
PIF_VALUE: 17
PIF_VALUE: 16
PIF_VALUE: 15
PIF_VALUE: 16
PIF_VALUE: 17
PIF_VALUE: 16
PIF_VALUE: 17
PIF_VALUE: 17
PIF_VALUE: 15
PIF_VALUE: 17
PIF_VALUE: 16
PIF_VALUE: 17
PIF_VALUE: 16
PIF_VALUE: 17
PIF_VALUE: 17
PIF_VALUE: 15

## 2019-04-17 NOTE — PROGRESS NOTES
Dr Alexandro Leventhal notified re: agitation. Fentanyl 50 mcg one times dose ordered. Increase Fentanyl gtt to 100 and give a 2mg one times dose of Versed.

## 2019-04-17 NOTE — PROGRESS NOTES
Patient biting ETT tube, de-satting and pulling against restraints. PRN Versed and Fentanyl given, Versed gtt increased to 7. Patient continues to bite tube and pull against restraints. Patient attempting to swing legs over side of bed. Restraints checked and tightened. 4th side rail of bed put up with patient's  mother's permission. Dr Alexandro Leventhal paged.

## 2019-04-17 NOTE — PROGRESS NOTES
Pulmonary & Critical Care Medicine ICU Progress Note    CC: Agitation    Events of Last 24 hours:   Propofol 50 mcg/kg/min   Versed 5 mg/hr    cc/hr       Invasive Lines: IV: IJ      MV:     Vent Mode: AC/VC Rate Set: 14 bmp/Vt Ordered: 450 mL/ /FiO2 : 25 %  Recent Labs     19  1745 19  0520   PHART 7.331* 7.375   NIC8OVA 47.6* 43.5   PO2ART 210.4* 119.7*       IV:   sodium chloride 100 mL/hr at 19 0306    propofol 50 mcg/kg/min (19 0511)    midazolam 3 mg/hr (19 0728)       Vitals:  Blood pressure 103/74, pulse 72, temperature 97.3 °F (36.3 °C), resp. rate 16, height 5' 11\" (1.803 m), weight 190 lb 1.6 oz (86.2 kg), SpO2 100 %. on   AC 14/450/+5 25%   Temp  Av.1 °F (36.7 °C)  Min: 97.3 °F (36.3 °C)  Max: 99.8 °F (37.7 °C)    Intake/Output Summary (Last 24 hours) at 2019 0743  Last data filed at 2019 4508  Gross per 24 hour   Intake 2970 ml   Output 765 ml   Net 2205 ml     EXAM:  Gen: No distress. Sedated and intubated  Eyes: PERRL. No sclera icterus. No conjunctival injection. ENT: No discharge. Pharynx clear. Neck: Trachea midline. No obvious mass. Resp: No accessory muscle use. No crackles. No wheezes. No rhonchi. No dullness on percussion. CV: NSR. Regular rhythm. No murmur or rub. No edema. Peripheral pulses are 2+. Capillary refill is less than 3 seconds. GI: Non-tender. Non-distended. No hernia. Skin: Warm and dry. No nodule on exposed extremities. Lymph: No cervical LAD. No supraclavicular LAD. M/S: No cyanosis. No joint deformity. No clubbing. Neuro: Sedated and intubated.     Psych: Sedated and intubated not able to obtain        Scheduled Meds:   folic acid, thiamine, multi-vitamin with vitamin K infusion   Intravenous Once    sodium chloride flush  10 mL Intravenous 2 times per day    enoxaparin  40 mg Subcutaneous Daily    mupirocin   Nasal BID    chlordiazePOXIDE  50 mg Oral TID     PRN Meds:  medicated lip balm, sodium chloride flush, magnesium hydroxide, ondansetron, potassium chloride **OR** potassium alternative oral replacement **OR** potassium chloride, magnesium sulfate, acetaminophen, traMADol **AND** cloNIDine, LORazepam    Results:  CBC:   Recent Labs     04/15/19  2034 04/16/19  1550 04/17/19  0532   WBC 8.6 7.8 6.2   HGB 14.0 14.2 11.5*   HCT 40.5 41.2 33.5*   MCV 92.0 92.1 93.0    324 257     BMP:   Recent Labs     04/15/19  2034 04/16/19  1659 04/17/19  0532   * 135* 135*   K 3.2* 4.0 3.9  3.9   CL 94* 101 101   CO2 24 25 26   PHOS  --   --  4.1   BUN 11 12 16   CREATININE 0.8* 0.9 0.8*     LIVER PROFILE:   Recent Labs     04/15/19  2034   AST 19   ALT 27   BILITOT 0.9   ALKPHOS 70       Cultures:  None     Films:  Chest x-ray 4/17 imaging was reviewed by me and showed no acute cardio pulmonary disease with satisfactory ETT and CVC positions.         ASSESSMENT:  · Acute respiratory failure   · Acute encephalopathy- suspecting drug withdrawal.  Meth overdose cannot be excluded  · Combativeness behavior/agitation   · Sinus tachycardia  · Polysubstance abuse- heroin and fentanyl abuse- recent admission for detox discharged 4/7/19 on Seroquel, Remeron, Busbar and Flexeril   · Electrolyte disturbance     PLAN:  · Mechanical ventilation as per my orders. The ventilator was adjusted by me at the bedside for unstable, life threatening respiratory failure. Follow ABG  · IV Propofol and Versed drip for sedation, target RASS -2, with daily spontaneous awakening trial.  Fentanyl PRN pain, gtt as needed  · Head of bed 30 degrees or higher at all times  · Daily spontaneous breathing trial once PEEP less than 8, FiO2 less than 55%.   · Poison control consult  · Abdominal x-ray evaluate for foreign body (drugs)  · IV fluid normal saline  · Rally pack  · Librium TID   · Follow CK   · QTc 441 this am   · Tube feed today   · GI prophylaxis: Pepcid  · DVT prophylaxis: Lovenox  · MRSA prophylaxis: Bactroban  · I discussed care plan with family at the bedside and multiple good questions were answered.                          Total critical care time caring for this patient with life threatening, unstable organ failure, including direct patient contact, management of life support systems, review of data including imaging and labs, discussions with other team members and physicians is 34 minutes, excluding procedures

## 2019-04-17 NOTE — PROGRESS NOTES
Patient continues to attempt to bite ETT tube and pull against restraints. PRN Ativan given and restraints checked and tightened.

## 2019-04-17 NOTE — PROGRESS NOTES
Patient had been heavily sedated most of night. Pt had started moving around midnight. Pt had painful grimace on face when adjusted head of bed @ 0055. Pt had sat up in the bed and continued to try to get up for several minutes starting @ 0110, a short time after reducing propofol infusion rate. Returned back to previous rate, gave PRN Ativan IV, and started versed infusion. Pt was able to calm down Around 0125. Pt has a high tolerance for medication due to prior drug abuse. Will continue to monitor.

## 2019-04-17 NOTE — PROGRESS NOTES
A: Assessment completed and documented, discussed plan of care with patient's mother who is in agreement, patient is sedated and intubated, is unable to understand, bed exit alarm is on for added safety.

## 2019-04-17 NOTE — PROGRESS NOTES
Dr Samira Joshi ordered Fentanyl gtt reordered as well as one time dose of 2 mg of versed and 50 mcg of fentanyl one time dose.

## 2019-04-17 NOTE — PROGRESS NOTES
D: Bedside report received from [employer], all current drips, treatments, skin issues, and plan of care were reviewed by both RN's, care transferred.

## 2019-04-17 NOTE — PROGRESS NOTES
Shift assessment complete. Medications administered at this time. Pt heavily sedated on ventilator. No s/s of distress. Mother at bedside.

## 2019-04-17 NOTE — PROGRESS NOTES
Progress Note    Admit Date:  4/15/2019    Subjective:  Mr. Michael Cordero was very agitated, required heavy sedation, intubated for airway protection    on Mech vent    on propofol, versed and fentanyl gtt   sats stable     Objective:   /72   Pulse 76   Temp 98.1 °F (36.7 °C) (Axillary)   Resp 13   Ht 5' 11\" (1.803 m)   Wt 190 lb 1.6 oz (86.2 kg)   SpO2 100%   BMI 26.51 kg/m²       Intake/Output Summary (Last 24 hours) at 4/17/2019 1632  Last data filed at 4/17/2019 1610  Gross per 24 hour   Intake 3917 ml   Output 1740 ml   Net 2177 ml         Physical Exam:  General:  Intubated, sedated   Skin:  Warm and dry  Neck:  JVD absent. Neck supple  Chest:  Clear to auscultation, respiration easy. No wheezes, rales or rhonchi. Cardiovascular:  RRR ,S1S2 normal  Abdomen:  Soft, non tender, non distended, BS +  Extremities:  No edema. Intact peripheral pulses. Brisk cap refill, < 2 secs  Neuro: non focal      Medications:   Scheduled Meds:   dextrose        folic acid, thiamine, multi-vitamin with vitamin K infusion   Intravenous Once    sodium chloride flush  10 mL Intravenous 2 times per day    enoxaparin  40 mg Subcutaneous Daily    mupirocin   Nasal BID    chlordiazePOXIDE  50 mg Oral TID       Continuous Infusions:   dextrose      fentaNYL (SUBLIMAZE) infusion 25 mcg/hr (04/17/19 1131)    sodium chloride 100 mL/hr at 04/17/19 1327    propofol 50 mcg/kg/min (04/17/19 1329)    midazolam 9 mg/hr (04/17/19 1314)       Data:  CBC:   Recent Labs     04/15/19  2034 04/16/19  1550 04/17/19  0532   WBC 8.6 7.8 6.2   RBC 4.40 4.48 3.60*   HGB 14.0 14.2 11.5*   HCT 40.5 41.2 33.5*   MCV 92.0 92.1 93.0   RDW 13.1 13.3 13.5    324 257     BMP:   Recent Labs     04/15/19  2034 04/16/19  1659 04/17/19  0532   * 135* 135*   K 3.2* 4.0 3.9  3.9   CL 94* 101 101   CO2 24 25 26   PHOS  --   --  4.1   BUN 11 12 16   CREATININE 0.8* 0.9 0.8*     BNP: No results for input(s): BNP in the last 72 hours.   PT/INR: No results for input(s): PROTIME, INR in the last 72 hours. APTT: No results for input(s): APTT in the last 72 hours. CARDIAC ENZYMES: No results for input(s): CKMB, CKMBINDEX, TROPONINI in the last 72 hours. Invalid input(s): CKTOTAL;3  FASTING LIPID PANEL:No results found for: CHOL, HDL, TRIG  LIVER PROFILE:   Recent Labs     04/15/19  2034   AST 19   ALT 27   BILITOT 0.9   ALKPHOS 70          XR CHEST PORTABLE   Final Result   Low volume study with vascular crowding versus mild edema, similar to prior. XR CHEST PORTABLE   Final Result   New life support apparatus as above. Mild edema. No pneumothorax. XR CHEST STANDARD (2 VW)   Final Result   No acute cardiopulmonary process. CT Head WO Contrast   Final Result   No acute intracranial abnormality or significant interval change from prior   study 10/01/2016. Assessment:  Active Problems:    History of mixed drug abuse    Acute encephalopathy    Psychosis ([facility] [address]    Agitation    Polysubstance abuse (HCC)    Acute respiratory failure (HCC)    Paranoia (HCC)    Combative behavior    Disorder of electrolytes    Sinus tachycardia  Resolved Problems:    * No resolved hospital problems. *      Plan:    Acute encephalopathy  - suspect related to substance abuse based on patient's history, yet etiology is not entirely clear  - UDS+ THC only   - head CT without acute findings  - initially admitted to medical floor but became severely agitated, code violet called, and transferred to ICU  - Multiple sedating meds administered in ER, see MAR.   Hald 5 mg IM and 25 mg IV benadryl now   -  on propofol, versed and fentanyl gtt  - cont PRN ativan   - intensivist c/s  - psychiatry c/s     acute resp failure  - intubated for airway protection  - pt requiring heavy sedation         Polysubstance abuse  - UDS + THC only today  - based on EMR- patient with prior fentanyl abuse      Hypokalemia  - replaced in ER, monitor BMP      DVT Prophylaxis: Lovenox   NPO  DIET TUBE FEED CONTINUOUS/CYCLIC NPO; 1.5 Calorie with Fiber (Jevity 1.5);  Orogastric; Continuous; 20; 40; 20  Dietary Nutrition Supplements: Protein Modular  Code Status: Full Code            Huong Menard MD 4/17/2019 4:32 PM

## 2019-04-17 NOTE — PROGRESS NOTES
Patient still biting ETT tube and pulling against restraints. Dr Deja Altamirano paged. Versed 5 IV one time dose, Fentanyl IV 50 mcg one time dose, and Fentanyl gtt ordered. Will continue to monitor.

## 2019-04-17 NOTE — PLAN OF CARE
Nutrition Problem: Inadequate oral intake  Intervention: Food and/or Nutrient Delivery: Continue NPO, Start Tube Feeding  Nutritional Goals: patient will tolerate Jevity 1.5 at goal rate of 40 ml/hr x 20 hours + one proteinex 2go once daily without GI distress, without s/s of aspiration, and without additional lab/fluid disturbances; weight will remain stable during remainder of admission

## 2019-04-17 NOTE — PROGRESS NOTES
Patient's glucose is 67. Dr Abram Cordero paged for hypoglycemia orders. Hypoglycemia protocol ordered and 12.5 D5 given.

## 2019-04-17 NOTE — PROGRESS NOTES
significant subcutaneous fat loss,    4. Muscle Loss-No significant muscle mass loss,    5. Fluid Accumulation-No significant fluid accumulation,    6.   Strength-Not measured    Nutrition Risk Level: High    Nutrition Needs:  · Estimated Daily Total Kcal: 1720 - 2150 kcals based on 20-25 kcals/kg/CBW  · Estimated Daily Protein (g): 94 - 109 g protein based on 1.2-1.4 g/kg/IBW  · Estimated Daily Fluid (ml/day): 1700 - 2100 ml     Nutrition Diagnosis:   · Problem: Inadequate oral intake  · Etiology: related to Impaired respiratory function-inability to consume food, Insufficient energy/nutrient consumption, Psychological cause/life stress     Signs and symptoms:  as evidenced by NPO status due to medical condition, Intubation, Nutrition support - EN, Lab values    Objective Information:  · Nutrition-Focused Physical Findings: patient is currently intubated and sedated on 50 mcg propofol (681 kcals from lipids); skin color is appropriate for ethnicity; hx of polysubstance abuse and patient was in rehab in the beginning of April; last BM unknown; no wounds or edema noted; + bilateral soft wrist restraints in place for patient/staff safety  · Wound Type: None  · Current Nutrition Therapies:  · Oral Diet Orders: NPO   · Oral Diet intake: NPO  · Oral Nutrition Supplement (ONS) Orders: None  · ONS intake: NPO  · Tube Feeding (TF) Orders:   · Feeding Route: Orogastric  · Formula: 1.5 Calorie with Fiber  · Rate (ml/hr):40 ml/hr     · Volume (ml/day): 800 ml   · Duration: Continuous  · Additives/Modulars: Protein(one proteinex 2go once daily)  · Water Flushes: 30 ml every 6 hours or per MD guidance  · Current TF & Flush Orders Provides: TF to be started today  · Goal TF & Flush Orders Provides: Jevity 1.5 with a goal rate of 40 ml/hr x 20 hours = 800 ml TV, 1200 kcals, 64 g protein, and 760 ml free water + 26 g protein and 104 kcals from one proteinex 2go once daily (90 g protein and 904 kcals total) + 30 ml water flushes

## 2019-04-17 NOTE — PROGRESS NOTES
04/17/19 0005   Vent Information   Vent Type 840   Vent Mode AC/VC   Vt Ordered 450 mL   Rate Set 14 bmp   Peak Flow 60 L/min   Pressure Support 0 cmH20   FiO2  25 %   Sensitivity 3   PEEP/CPAP 5   I Time/ I Time % 0 s   Humidification Source Heated wire   Humidification Temp 37   Humidification Temp Measured 36.8   Circuit Condensation Drained   Vent Patient Data   High Peep/I Pressure 0   Peak Inspiratory Pressure 15 cmH2O   Mean Airway Pressure 8.7 cmH20   Rate Measured 16 br/min   Vt Exhaled 486 mL   Minute Volume 8.01 Liters   I:E Ratio 1:3.60   Cough/Sputum   Sputum Amount Small   Sputum Color Cloudy   Tenacity Thick   Spontaneous Breathing Trial (SBT) RT Doc   Pulse 84   SpO2 100 %   Breath Sounds   Right Upper Lobe Diminished   Right Middle Lobe Diminished   Right Lower Lobe Diminished   Left Upper Lobe Diminished   Left Lower Lobe Diminished   Additional Respiratory  Assessments   Resp 17   Position Semi-Contreras's   Alarm Settings   High Pressure Alarm 50 cmH2O   Low Minute Volume Alarm 4 L/min   High Respiratory Rate 45 br/min   ETT (adult)   Placement Date/Time: 04/16/19 8052   Timeout: Patient;Procedure;Site/Side;Appropriate Equipment  Preoxygenation: Yes  Mask Ventilation: Ventilated by mask (1)  Technique: Rapid sequence  Type: Cuffed  Tube Size: 8 mm  Laryngoscope: GlideScope  Locatio. ..    Secured at 23 cm   Measured From [address] [place] [address],[facility] Floor By [employer] tube pritchett   Site Condition Dry

## 2019-04-18 ENCOUNTER — APPOINTMENT (OUTPATIENT)
Dept: GENERAL RADIOLOGY | Age: 39
DRG: 052 | End: 2019-04-18
Payer: COMMERCIAL

## 2019-04-18 LAB
ANION GAP SERPL CALCULATED.3IONS-SCNC: 8 MMOL/L (ref 3–16)
BASE EXCESS ARTERIAL: 1.9 MMOL/L (ref -3–3)
BASOPHILS ABSOLUTE: 0 K/UL (ref 0–0.2)
BASOPHILS RELATIVE PERCENT: 0.6 %
BUN BLDV-MCNC: 12 MG/DL (ref 7–20)
CALCIUM SERPL-MCNC: 8.4 MG/DL (ref 8.3–10.6)
CARBOXYHEMOGLOBIN ARTERIAL: 0.9 % (ref 0–1.5)
CHLORIDE BLD-SCNC: 102 MMOL/L (ref 99–110)
CO2: 28 MMOL/L (ref 21–32)
CREAT SERPL-MCNC: 0.7 MG/DL (ref 0.9–1.3)
EOSINOPHILS ABSOLUTE: 0.1 K/UL (ref 0–0.6)
EOSINOPHILS RELATIVE PERCENT: 2.4 %
GFR AFRICAN AMERICAN: >60
GFR NON-AFRICAN AMERICAN: >60
GLUCOSE BLD-MCNC: 103 MG/DL (ref 70–99)
GLUCOSE BLD-MCNC: 84 MG/DL (ref 70–99)
GLUCOSE BLD-MCNC: 99 MG/DL (ref 70–99)
HCO3 ARTERIAL: 26 MMOL/L (ref 21–29)
HCT VFR BLD CALC: 34 % (ref 40.5–52.5)
HEMOGLOBIN, ART, EXTENDED: 11.9 G/DL (ref 13.5–17.5)
HEMOGLOBIN: 11.6 G/DL (ref 13.5–17.5)
LYMPHOCYTES ABSOLUTE: 1.1 K/UL (ref 1–5.1)
LYMPHOCYTES RELATIVE PERCENT: 18.4 %
MAGNESIUM: 1.8 MG/DL (ref 1.8–2.4)
MCH RBC QN AUTO: 31.1 PG (ref 26–34)
MCHC RBC AUTO-ENTMCNC: 34.1 G/DL (ref 31–36)
MCV RBC AUTO: 91.3 FL (ref 80–100)
METHEMOGLOBIN ARTERIAL: 0.4 %
MONOCYTES ABSOLUTE: 0.5 K/UL (ref 0–1.3)
MONOCYTES RELATIVE PERCENT: 8.5 %
NEUTROPHILS ABSOLUTE: 4.3 K/UL (ref 1.7–7.7)
NEUTROPHILS RELATIVE PERCENT: 70.1 %
O2 CONTENT ARTERIAL: 17 ML/DL
O2 SAT, ARTERIAL: 97.7 %
O2 THERAPY: ABNORMAL
PCO2 ARTERIAL: 38.5 MMHG (ref 35–45)
PDW BLD-RTO: 13.1 % (ref 12.4–15.4)
PERFORMED ON: ABNORMAL
PERFORMED ON: NORMAL
PH ARTERIAL: 7.45 (ref 7.35–7.45)
PHOSPHORUS: 3.9 MG/DL (ref 2.5–4.9)
PLATELET # BLD: 266 K/UL (ref 135–450)
PMV BLD AUTO: 6.6 FL (ref 5–10.5)
PO2 ARTERIAL: 97.5 MMHG (ref 75–108)
POTASSIUM SERPL-SCNC: 3.7 MMOL/L (ref 3.5–5.1)
RBC # BLD: 3.72 M/UL (ref 4.2–5.9)
SODIUM BLD-SCNC: 138 MMOL/L (ref 136–145)
TCO2 ARTERIAL: 27.2 MMOL/L
TOTAL CK: 288 U/L (ref 39–308)
WBC # BLD: 6.2 K/UL (ref 4–11)

## 2019-04-18 PROCEDURE — 84100 ASSAY OF PHOSPHORUS: CPT

## 2019-04-18 PROCEDURE — 80048 BASIC METABOLIC PNL TOTAL CA: CPT

## 2019-04-18 PROCEDURE — 94750 HC PULMONARY COMPLIANCE STUDY: CPT

## 2019-04-18 PROCEDURE — 2700000000 HC OXYGEN THERAPY PER DAY

## 2019-04-18 PROCEDURE — 94762 N-INVAS EAR/PLS OXIMTRY CONT: CPT

## 2019-04-18 PROCEDURE — 6360000002 HC RX W HCPCS: Performed by: PHYSICIAN ASSISTANT

## 2019-04-18 PROCEDURE — 74018 RADEX ABDOMEN 1 VIEW: CPT

## 2019-04-18 PROCEDURE — 82550 ASSAY OF CK (CPK): CPT

## 2019-04-18 PROCEDURE — 82803 BLOOD GASES ANY COMBINATION: CPT

## 2019-04-18 PROCEDURE — 6360000002 HC RX W HCPCS: Performed by: INTERNAL MEDICINE

## 2019-04-18 PROCEDURE — 2580000003 HC RX 258: Performed by: PHYSICIAN ASSISTANT

## 2019-04-18 PROCEDURE — 94003 VENT MGMT INPAT SUBQ DAY: CPT

## 2019-04-18 PROCEDURE — 83735 ASSAY OF MAGNESIUM: CPT

## 2019-04-18 PROCEDURE — 36592 COLLECT BLOOD FROM PICC: CPT

## 2019-04-18 PROCEDURE — 36600 WITHDRAWAL OF ARTERIAL BLOOD: CPT

## 2019-04-18 PROCEDURE — 2580000003 HC RX 258: Performed by: INTERNAL MEDICINE

## 2019-04-18 PROCEDURE — 6370000000 HC RX 637 (ALT 250 FOR IP): Performed by: PHYSICIAN ASSISTANT

## 2019-04-18 PROCEDURE — 99291 CRITICAL CARE FIRST HOUR: CPT | Performed by: INTERNAL MEDICINE

## 2019-04-18 PROCEDURE — 2000000000 HC ICU R&B

## 2019-04-18 PROCEDURE — 6370000000 HC RX 637 (ALT 250 FOR IP): Performed by: INTERNAL MEDICINE

## 2019-04-18 PROCEDURE — 2500000003 HC RX 250 WO HCPCS: Performed by: INTERNAL MEDICINE

## 2019-04-18 PROCEDURE — 85025 COMPLETE CBC W/AUTO DIFF WBC: CPT

## 2019-04-18 PROCEDURE — 71045 X-RAY EXAM CHEST 1 VIEW: CPT

## 2019-04-18 PROCEDURE — 99233 SBSQ HOSP IP/OBS HIGH 50: CPT | Performed by: PHYSICIAN ASSISTANT

## 2019-04-18 RX ORDER — FENTANYL CITRATE 50 UG/ML
50 INJECTION, SOLUTION INTRAMUSCULAR; INTRAVENOUS ONCE
Status: DISCONTINUED | OUTPATIENT
Start: 2019-04-18 | End: 2019-04-24

## 2019-04-18 RX ORDER — FENTANYL CITRATE 50 UG/ML
50 INJECTION, SOLUTION INTRAMUSCULAR; INTRAVENOUS
Status: DISCONTINUED | OUTPATIENT
Start: 2019-04-18 | End: 2019-04-24

## 2019-04-18 RX ORDER — MIDAZOLAM HYDROCHLORIDE 1 MG/ML
2 INJECTION INTRAMUSCULAR; INTRAVENOUS
Status: DISCONTINUED | OUTPATIENT
Start: 2019-04-18 | End: 2019-04-22

## 2019-04-18 RX ORDER — QUETIAPINE FUMARATE 25 MG/1
25 TABLET, FILM COATED ORAL 2 TIMES DAILY
Status: DISCONTINUED | OUTPATIENT
Start: 2019-04-18 | End: 2019-04-18

## 2019-04-18 RX ORDER — MIDAZOLAM HYDROCHLORIDE 1 MG/ML
INJECTION INTRAMUSCULAR; INTRAVENOUS
Status: DISPENSED
Start: 2019-04-18 | End: 2019-04-18

## 2019-04-18 RX ORDER — QUETIAPINE FUMARATE 25 MG/1
50 TABLET, FILM COATED ORAL 2 TIMES DAILY
Status: DISCONTINUED | OUTPATIENT
Start: 2019-04-18 | End: 2019-04-19

## 2019-04-18 RX ORDER — MIDAZOLAM HYDROCHLORIDE 1 MG/ML
2 INJECTION INTRAMUSCULAR; INTRAVENOUS ONCE
Status: DISCONTINUED | OUTPATIENT
Start: 2019-04-18 | End: 2019-04-24

## 2019-04-18 RX ORDER — DIAZEPAM 10 MG/1
10 TABLET ORAL 3 TIMES DAILY
Status: DISCONTINUED | OUTPATIENT
Start: 2019-04-18 | End: 2019-04-24

## 2019-04-18 RX ORDER — FENTANYL CITRATE 50 UG/ML
50 INJECTION, SOLUTION INTRAMUSCULAR; INTRAVENOUS ONCE
Status: COMPLETED | OUTPATIENT
Start: 2019-04-18 | End: 2019-04-18

## 2019-04-18 RX ORDER — MIDAZOLAM HYDROCHLORIDE 1 MG/ML
4 INJECTION INTRAMUSCULAR; INTRAVENOUS ONCE
Status: COMPLETED | OUTPATIENT
Start: 2019-04-18 | End: 2019-04-18

## 2019-04-18 RX ORDER — SENNA AND DOCUSATE SODIUM 50; 8.6 MG/1; MG/1
2 TABLET, FILM COATED ORAL 2 TIMES DAILY
Status: DISCONTINUED | OUTPATIENT
Start: 2019-04-18 | End: 2019-04-25

## 2019-04-18 RX ADMIN — FENTANYL CITRATE 200 MCG/HR: 50 INJECTION, SOLUTION INTRAMUSCULAR; INTRAVENOUS at 16:20

## 2019-04-18 RX ADMIN — MIDAZOLAM HYDROCHLORIDE 4 MG: 1 INJECTION INTRAMUSCULAR; INTRAVENOUS at 09:02

## 2019-04-18 RX ADMIN — ACETAMINOPHEN 650 MG: 325 TABLET ORAL at 18:23

## 2019-04-18 RX ADMIN — DEXMEDETOMIDINE HYDROCHLORIDE 0.2 MCG/KG/HR: 100 INJECTION, SOLUTION INTRAVENOUS at 12:42

## 2019-04-18 RX ADMIN — THIAMINE HYDROCHLORIDE: 100 INJECTION, SOLUTION INTRAMUSCULAR; INTRAVENOUS at 11:36

## 2019-04-18 RX ADMIN — FENTANYL CITRATE 50 MCG: 50 INJECTION INTRAMUSCULAR; INTRAVENOUS at 06:46

## 2019-04-18 RX ADMIN — MUPIROCIN: 20 OINTMENT TOPICAL at 07:58

## 2019-04-18 RX ADMIN — ENOXAPARIN SODIUM 40 MG: 40 INJECTION SUBCUTANEOUS at 07:57

## 2019-04-18 RX ADMIN — SENNOSIDES AND DOCUSATE SODIUM 2 TABLET: 8.6; 5 TABLET ORAL at 11:36

## 2019-04-18 RX ADMIN — PROPOFOL 50 MCG/KG/MIN: 10 INJECTION, EMULSION INTRAVENOUS at 00:35

## 2019-04-18 RX ADMIN — PROPOFOL 50 MCG/KG/MIN: 10 INJECTION, EMULSION INTRAVENOUS at 04:21

## 2019-04-18 RX ADMIN — SODIUM CHLORIDE: 9 INJECTION, SOLUTION INTRAVENOUS at 08:00

## 2019-04-18 RX ADMIN — MIDAZOLAM HYDROCHLORIDE 10 MG/HR: 5 INJECTION, SOLUTION INTRAMUSCULAR; INTRAVENOUS at 04:02

## 2019-04-18 RX ADMIN — PROPOFOL 50 MCG/KG/MIN: 10 INJECTION, EMULSION INTRAVENOUS at 11:47

## 2019-04-18 RX ADMIN — Medication 10 ML: at 07:59

## 2019-04-18 RX ADMIN — Medication 10 ML: at 20:15

## 2019-04-18 RX ADMIN — QUETIAPINE FUMARATE 50 MG: 25 TABLET ORAL at 20:15

## 2019-04-18 RX ADMIN — QUETIAPINE FUMARATE 50 MG: 25 TABLET ORAL at 11:36

## 2019-04-18 RX ADMIN — FENTANYL CITRATE 100 MCG/HR: 50 INJECTION, SOLUTION INTRAMUSCULAR; INTRAVENOUS at 01:29

## 2019-04-18 RX ADMIN — FENTANYL CITRATE 200 MCG/HR: 50 INJECTION, SOLUTION INTRAMUSCULAR; INTRAVENOUS at 22:33

## 2019-04-18 RX ADMIN — MIDAZOLAM HYDROCHLORIDE 13 MG/HR: 5 INJECTION, SOLUTION INTRAMUSCULAR; INTRAVENOUS at 22:39

## 2019-04-18 RX ADMIN — PROPOFOL 50 MCG/KG/MIN: 10 INJECTION, EMULSION INTRAVENOUS at 16:22

## 2019-04-18 RX ADMIN — PROPOFOL 50 MCG/KG/MIN: 10 INJECTION, EMULSION INTRAVENOUS at 20:15

## 2019-04-18 RX ADMIN — FENTANYL CITRATE 50 MCG: 50 INJECTION INTRAMUSCULAR; INTRAVENOUS at 09:00

## 2019-04-18 RX ADMIN — MIDAZOLAM HYDROCHLORIDE 13 MG/HR: 5 INJECTION, SOLUTION INTRAMUSCULAR; INTRAVENOUS at 12:51

## 2019-04-18 RX ADMIN — FENTANYL CITRATE 175 MCG/HR: 50 INJECTION, SOLUTION INTRAMUSCULAR; INTRAVENOUS at 10:24

## 2019-04-18 RX ADMIN — DIAZEPAM 10 MG: 10 TABLET ORAL at 16:19

## 2019-04-18 RX ADMIN — CHLORDIAZEPOXIDE HYDROCHLORIDE 50 MG: 25 CAPSULE ORAL at 07:57

## 2019-04-18 RX ADMIN — MUPIROCIN: 20 OINTMENT TOPICAL at 22:40

## 2019-04-18 RX ADMIN — SENNOSIDES AND DOCUSATE SODIUM 2 TABLET: 8.6; 5 TABLET ORAL at 20:15

## 2019-04-18 RX ADMIN — PROPOFOL 50 MCG/KG/MIN: 10 INJECTION, EMULSION INTRAVENOUS at 08:57

## 2019-04-18 RX ADMIN — SODIUM CHLORIDE: 9 INJECTION, SOLUTION INTRAVENOUS at 18:23

## 2019-04-18 RX ADMIN — DIAZEPAM 10 MG: 10 TABLET ORAL at 11:07

## 2019-04-18 RX ADMIN — DIAZEPAM 10 MG: 10 TABLET ORAL at 20:15

## 2019-04-18 RX ADMIN — MIDAZOLAM HYDROCHLORIDE 13 MG/HR: 5 INJECTION, SOLUTION INTRAMUSCULAR; INTRAVENOUS at 13:27

## 2019-04-18 ASSESSMENT — PULMONARY FUNCTION TESTS
PIF_VALUE: 23
PIF_VALUE: 18
PIF_VALUE: 23
PIF_VALUE: 19
PIF_VALUE: 17
PIF_VALUE: 17
PIF_VALUE: 19
PIF_VALUE: 18
PIF_VALUE: 21
PIF_VALUE: 19
PIF_VALUE: 18
PIF_VALUE: 19
PIF_VALUE: 20
PIF_VALUE: 22
PIF_VALUE: 23
PIF_VALUE: 20
PIF_VALUE: 18
PIF_VALUE: 20
PIF_VALUE: 18
PIF_VALUE: 20
PIF_VALUE: 15
PIF_VALUE: 24
PIF_VALUE: 19

## 2019-04-18 ASSESSMENT — PAIN SCALES - GENERAL
PAINLEVEL_OUTOF10: 0

## 2019-04-18 NOTE — PROGRESS NOTES
A: Dandy's test performed on right radial wrist, test was positive, site prepped with alcohol, blood gas drawn then sterile gauze was applied to site and direct pressure was held for one full minute, secured gauze with tape.

## 2019-04-18 NOTE — PROGRESS NOTES
D: Patient is agitated and kicking in the bed, and pulling against restraints. A: Attempted to calm patient telling him that he was safe, mother at bedside also trying to calm him. Increased fentanyl per dr order, see MAR. TC to Dr. Miri Spivey to update him on patient's increased agitation, see new orders.

## 2019-04-18 NOTE — CONSULTS
Patient:  Chandrika Fuchs   :   1980   PCP:   Xochitl WEBB, DO  Date:    2019    This 45 y.o. male was admitted 4/15/2019 with a diagnosis of \"Encephalopathy [G93.40]\" and I am aware of a request for evaluation regarding \"aggression and delusional behaviors  \"  Allergies: No Known Allergies  Present  Diet Order: DIET TUBE FEED CONTINUOUS/CYCLIC NPO; 1.5 Calorie with Fiber (Jevity 1.5); Orogastric; Continuous; 20; 40; 20  Dietary Nutrition Supplements: Protein Modular   /75   Pulse 93   Temp 100.2 °F (37.9 °C) (Axillary)   Resp 14   Ht 5' 11\" (1.803 m)   Wt 190 lb 1.6 oz (86.2 kg)   SpO2 100%   BMI 26.51 kg/m²    Recent Labs     04/15/19  2034 19  1550 19  1659 19  0532 19  0510   WBC 8.6 7.8  --  6.2 6.2   HGB 14.0 14.2  --  11.5* 11.6*   MCV 92.0 92.1  --  93.0 91.3    324  --  257 266   *  --  135* 135* 138   K 3.2*  --  4.0 3.9  3.9 3.7   CL 94*  --  101 101 102   CO2 24  --  25 26 28   BUN 11  --  12 16 12   CREATININE 0.8*  --  0.9 0.8* 0.7*   GLUCOSE 165*  --  86 82 84   CALCIUM 9.5  --  8.5 8.7 8.4   PROT 7.4  --   --   --   --    LABALBU 4.6  --   --   --   --    AST 19  --   --   --   --    ALT 27  --   --   --   --    ALKPHOS 70  --   --   --   --    BILITOT 0.9  --   --   --   --    MG 2.10  --   --  1.90 1.80       Plan:   Psych will see the patient once he is extubated and able to converse. Please reconsult when pt more medically stable  did not enter orders.

## 2019-04-18 NOTE — PROGRESS NOTES
High risk vesicant drug infusing: NO    Multiple incompatible medications infusing:  YES    CVP Monitoring: NO    Extremely difficult IV access challenge:  NO    Continued need for central line access:  YES    Addressed with physician:  YES    RIGHT PATIENT, RIGHT TIME, RIGHT LINE

## 2019-04-18 NOTE — PLAN OF CARE
Nutrition Problem: Inadequate oral intake  Intervention: Food and/or Nutrient Delivery: Continue NPO, Continue current Tube Feeding  Nutritional Goals: patient will tolerate Jevity 1.5 at goal rate of 40 ml/hr x 20 hours + one proteinex 2go once daily without GI distress, without s/s of aspiration, and without additional lab/fluid disturbances; weight will remain stable during remainder of admission

## 2019-04-18 NOTE — PROGRESS NOTES
Progress Note    Admit Date:  4/15/2019    Subjective:  Mr. Nidhi Serrato was very agitated, required heavy sedation, intubated for airway protection    on [facility] vent    on propofol, versed and fentanyl gtt   sats stable     4/18: Nursing staff reports that patient has still been very aggressive. Requiring precedex gtt now too and TID Valium   FiO2:25%    Objective:   /71   Pulse 93   Temp 100.2 °F (37.9 °C) (Axillary)   Resp 15   Ht 5' 11\" (1.803 m)   Wt 190 lb 1.6 oz (86.2 kg)   SpO2 96%   BMI 26.51 kg/m²       Intake/Output Summary (Last 24 hours) at 4/18/2019 1208  Last data filed at 4/18/2019 1137  Gross per 24 hour   Intake 3741.93 ml   Output 2575 ml   Net 1166.93 ml         Physical Exam:  General:  Intubated, sedated   Skin:  Warm and dry  Neck:  JVD absent. Neck supple  Chest:  Clear to auscultation, respiration easy. No wheezes, rales or rhonchi. Cardiovascular:  RRR ,S1S2 normal  Abdomen:  Soft, non tender, non distended, BS +  Extremities:  No edema. Intact peripheral pulses.  Brisk cap refill, < 2 secs  Neuro: non focal      Medications:   Scheduled Meds:   fentanNYL  50 mcg Intravenous Once    midazolam  2 mg Intravenous Once    midazolam        diazepam  10 mg Oral TID    QUEtiapine  50 mg Oral BID    folic acid, thiamine, multi-vitamin with vitamin K infusion   Intravenous Daily    sennosides-docusate sodium  2 tablet Oral BID    folic acid, thiamine, multi-vitamin with vitamin K infusion   Intravenous Once    sodium chloride flush  10 mL Intravenous 2 times per day    enoxaparin  40 mg Subcutaneous Daily    mupirocin   Nasal BID       Continuous Infusions:   dexmedetomidine (PRECEDEX) IV infusion      dextrose      fentaNYL (SUBLIMAZE) infusion 175 mcg/hr (04/18/19 1024)    sodium chloride 100 mL/hr at 04/18/19 0800    propofol 50 mcg/kg/min (04/18/19 1147)    midazolam 13 mg/hr (04/18/19 0858)       Data:  CBC:   Recent Labs     04/16/19  1550 04/17/19  0532 04/18/19  0510 WBC 7.8 6.2 6.2   RBC 4.48 3.60* 3.72*   HGB 14.2 11.5* 11.6*   HCT 41.2 33.5* 34.0*   MCV 92.1 93.0 91.3   RDW 13.3 13.5 13.1    257 266     BMP:   Recent Labs     04/16/19  1659 04/17/19  0532 04/18/19  0510   * 135* 138   K 4.0 3.9  3.9 3.7    101 102   CO2 25 26 28   PHOS  --  4.1 3.9   BUN 12 16 12   CREATININE 0.9 0.8* 0.7*     BNP: No results for input(s): BNP in the last 72 hours. PT/INR: No results for input(s): PROTIME, INR in the last 72 hours. APTT: No results for input(s): APTT in the last 72 hours. CARDIAC ENZYMES: No results for input(s): CKMB, CKMBINDEX, TROPONINI in the last 72 hours. Invalid input(s): CKTOTAL;3  FASTING LIPID PANEL:No results found for: CHOL, HDL, TRIG  LIVER PROFILE:   Recent Labs     04/15/19  2034   AST 19   ALT 27   BILITOT 0.9   ALKPHOS 70          XR CHEST PORTABLE   Final Result   No evidence for acute cardiopulmonary process. XR ABDOMEN (KUB) (SINGLE AP VIEW)   Final Result   No foreign body is identified. Normal bowel gas pattern. XR CHEST PORTABLE   Final Result   Low volume study with vascular crowding versus mild edema, similar to prior. XR CHEST PORTABLE   Final Result   New life support apparatus as above. Mild edema. No pneumothorax. XR CHEST STANDARD (2 VW)   Final Result   No acute cardiopulmonary process. CT Head WO Contrast   Final Result   No acute intracranial abnormality or significant interval change from prior   study 10/01/2016. Assessment:  Active Problems:    History of mixed drug abuse    Acute encephalopathy    Psychosis ([address]    Agitation    Polysubstance abuse (HCC)    Acute respiratory failure (HCC)    Paranoia (HCC)    Combative behavior    Disorder of electrolytes    Sinus tachycardia  Resolved Problems:    * No resolved hospital problems.  *      Plan:    Acute encephalopathy  - suspect related to substance abuse based on patient's history, yet etiology is not entirely clear  - UDS+ THC only   - head CT without acute findings  - initially admitted to medical floor but became severely agitated, code violet called, and transferred to ICU  - Multiple sedating meds administered in ER, see MAR. Hald 5 mg IM and 25 mg IV benadryl now   -  on propofol, versed and fentanyl gtt  - Valium TID, precedex gtt started 4/18  - cont PRN ativan   - intensivist c/s  - psychiatry c/s     acute resp failure  - intubated for airway protection  - pt requiring heavy sedation      Polysubstance abuse  - UDS + THC only today  - based on EMR- patient with prior fentanyl abuse      Hypokalemia  - replaced in ER, monitor BMP     Low grade Temp  - No significant source   - continue to monitor closely     DVT Prophylaxis: Lovenox   NPO  DIET TUBE FEED CONTINUOUS/CYCLIC NPO; 1.5 Calorie with Fiber (Jevity 1.5); Orogastric; Continuous; 20; 40; 20  Dietary Nutrition Supplements: Protein Modular  Code Status: Full Code    Hospital course and plan of care discussed with Dr. Jacinda Blanco. I spoke extensively with the patients mother today regarding the recent change in the patients behavior. She reports that he has never acted this way in the past. His father is bipolar and his mother reports that the patient has gone through rehab before but has never seen a psychiatrist before, but is very concerned with his behaviors stating that he had been sober for 8 months and doing well.      Katie Kat PA-C  4/18/2019 12:09 PM'

## 2019-04-18 NOTE — PROGRESS NOTES
PRN tylenol administered for elevated temp. Mother states that pt has been taking [facility] wart and wonders if it has something to do with pt's psychosis. Pt resting comfortably at this time, will continue to monitor.   Shiloh Olguin RN

## 2019-04-18 NOTE — PROGRESS NOTES
Protein supplement given per OG at this time and flushed with 30ml of water. Tube bee continues at goal, pt tolerating well.   Johnathan Olguin RN

## 2019-04-18 NOTE — PROGRESS NOTES
04/17/19 2344   Vent Information   Vent Type 840   Vent Mode AC/VC   Vt Ordered 450 mL   Rate Set 14 bmp   Peak Flow 60 L/min   Pressure Support 0 cmH20   FiO2  25 %   Sensitivity 3   PEEP/CPAP 5   I Time/ I Time % 0 s   Humidification Source Heated wire   Humidification Temp 37   Humidification Temp Measured 37   Circuit Condensation Drained   Vent Patient Data   High Peep/I Pressure 0   Peak Inspiratory Pressure 17 cmH2O   Mean Airway Pressure 8.8 cmH20   Rate Measured 15 br/min   Vt Exhaled 479 mL   Minute Volume 7.03 Liters   I:E Ratio 1:4.20   Cough/Sputum   Sputum Amount None   Spontaneous Breathing Trial (SBT) RT Doc   Pulse 88   SpO2 96 %   Breath Sounds   Right Upper Lobe Diminished   Right Middle Lobe Diminished   Right Lower Lobe Diminished   Left Upper Lobe Diminished   Left Lower Lobe Diminished   Additional Respiratory  Assessments   Position Semi-Contreras's   Alarm Settings   High Pressure Alarm 50 cmH2O   Low Minute Volume Alarm 4 L/min   High Respiratory Rate 45 br/min   ETT (adult)   Placement Date/Time: 04/16/19 1542   Timeout: Patient;Procedure;Site/Side;Appropriate Equipment  Preoxygenation: Yes  Mask Ventilation: Ventilated by mask (1)  Technique: Rapid sequence  Type: Cuffed  Tube Size: 8 mm  Laryngoscope: GlideScope  Locatio. ..    Secured at 22 cm   Measured From [address] [place] [address],[address] By [employer] tube pritchett   Site Condition Dry

## 2019-04-18 NOTE — PROGRESS NOTES
Pt continues with elevated temp. Cool compress placed to forehead, blankets removed, and fan placed in room. Will continue to monitor.   Vee Olguin RN

## 2019-04-18 NOTE — PROGRESS NOTES
04/18/19 0315   Vent Information   Vent Type 840   Vent Mode AC/VC   Vt Ordered 450 mL   Rate Set 14 bmp   Peak Flow 60 L/min   Pressure Support 0 cmH20   FiO2  25 %   Sensitivity 3   PEEP/CPAP 5   I Time/ I Time % 0 s   Vent Patient Data   High Peep/I Pressure 0   Peak Inspiratory Pressure 18 cmH2O   Mean Airway Pressure 9.3 cmH20   Rate Measured 17 br/min   Vt Exhaled 491 mL   Minute Volume 7.77 Liters   I:E Ratio 1:4.20   Plateau Pressure 16 CZF35   Cough/Sputum   Sputum How Obtained None   Spontaneous Breathing Trial (SBT) RT Doc   Pulse 86   SpO2 98 %   Breath Sounds   Right Upper Lobe Diminished   Right Middle Lobe Diminished   Right Lower Lobe Diminished   Left Upper Lobe Diminished   Left Lower Lobe Diminished   Additional Respiratory  Assessments   Resp 12   Position Semi-Contreras's   Oral Care Completed? Yes   Oral Care Mouth suctioned   Subglottic Suction Done? Yes   Alarm Settings   High Pressure Alarm 50 cmH2O   Low Minute Volume Alarm 4 L/min   Apnea (secs) 20 secs   High Respiratory Rate 45 br/min   Low Exhaled Vt  300 mL   ETT (adult)   Placement Date/Time: 04/16/19 1542   Timeout: Patient;Procedure;Site/Side;Appropriate Equipment  Preoxygenation: Yes  Mask Ventilation: Ventilated by mask (1)  Technique: Rapid sequence  Type: Cuffed  Tube Size: 8 mm  Laryngoscope: GlideScope  Locatio. ..    Secured at 25 cm   Measured From [address] [place] [address],[address] By [employer] tube pritchett   Site Condition Dry

## 2019-04-18 NOTE — PROGRESS NOTES
Pulmonary & Critical Care Medicine ICU Progress Note    CC: Agitation    Events of Last 24 hours:   Propofol 50 mcg/kg/min   Versed 13 mg/hr  Fentanyl 175 mcg/hr    cc/hr   Intermittent agitation requiring Versed and Fentanyl push     Invasive Lines: IV: IJ      MV:     Vent Mode: AC/VC Rate Set: 14 bmp/Vt Ordered: 450 mL/ /FiO2 : 25 %  Recent Labs     19  0520 19  0525   PHART 7.375 7.447   PTU6PTA 43.5 38.5   PO2ART 119.7* 97.5       IV:   dextrose      fentaNYL (SUBLIMAZE) infusion 125 mcg/hr (19 0647)    sodium chloride 100 mL/hr at 19 2230    propofol 50 mcg/kg/min (19)    midazolam 10 mg/hr (19 040)       Vitals:  Blood pressure 112/75, pulse 93, temperature 97.3 °F (36.3 °C), temperature source Axillary, resp. rate 15, height 5' 11\" (1.803 m), weight 190 lb 1.6 oz (86.2 kg), SpO2 100 %. on   AC 14/450/+5 25%   Temp  Av °F (36.7 °C)  Min: 97.3 °F (36.3 °C)  Max: 98.4 °F (36.9 °C)    Intake/Output Summary (Last 24 hours) at 2019 0726  Last data filed at 2019 0600  Gross per 24 hour   Intake 3982.93 ml   Output 2400 ml   Net 1582.93 ml     EXAM:  Gen: No distress. Sedated and intubated  Eyes: PERRL. No sclera icterus. No conjunctival injection. ENT: No discharge. Pharynx clear. Neck: Trachea midline. No obvious mass. Resp: No accessory muscle use. No crackles. No wheezes. No rhonchi. No dullness on percussion. CV: NSR. Regular rhythm. No murmur or rub. No edema. GI: Non-tender. Non-distended. No hernia. Skin: Warm and dry. No nodule on exposed extremities. Lymph: No cervical LAD. No supraclavicular LAD. M/S: No cyanosis. No joint deformity. No clubbing. Neuro: Sedated and intubated.     Psych: Sedated and intubated not able to obtain        Scheduled Meds:   fentanNYL  50 mcg Intravenous Once    midazolam  2 mg Intravenous Once    folic acid, thiamine, multi-vitamin with vitamin K infusion   Intravenous Once    sodium chloride flush  10 mL Intravenous 2 times per day    enoxaparin  40 mg Subcutaneous Daily    mupirocin   Nasal BID    chlordiazePOXIDE  50 mg Oral TID     PRN Meds:  glucose, dextrose, glucagon (rDNA), dextrose, medicated lip balm, sodium chloride flush, magnesium hydroxide, ondansetron, potassium chloride **OR** potassium alternative oral replacement **OR** potassium chloride, magnesium sulfate, acetaminophen, traMADol **AND** cloNIDine    Results:  CBC:   Recent Labs     04/16/19  1550 04/17/19  0532 04/18/19  0510   WBC 7.8 6.2 6.2   HGB 14.2 11.5* 11.6*   HCT 41.2 33.5* 34.0*   MCV 92.1 93.0 91.3    257 266     BMP:   Recent Labs     04/16/19  1659 04/17/19  0532 04/18/19  0510   * 135* 138   K 4.0 3.9  3.9 3.7    101 102   CO2 25 26 28   PHOS  --  4.1 3.9   BUN 12 16 12   CREATININE 0.9 0.8* 0.7*     LIVER PROFILE:   Recent Labs     04/15/19  2034   AST 19   ALT 27   BILITOT 0.9   ALKPHOS 70       Cultures:  None     Films:  Chest x-ray 4/18 imaging was reviewed by me and showed no acute cardio pulmonary disease with satisfactory ETT and CVC positions.         ASSESSMENT:  · Acute respiratory failure   · Acute encephalopathy- suspecting drug withdrawal.  Meth overdose cannot be excluded  · Combativeness behavior/agitation   · Sinus tachycardia  · Polysubstance abuse- heroin and fentanyl abuse- recent admission for detox discharged 4/7/19 on Seroquel, Remeron, Busbar and Flexeril   · Electrolyte disturbance       PLAN:  · Mechanical ventilation as per my orders. The ventilator was adjusted by me at the bedside for unstable, life threatening respiratory failure. Follow ABG  · IV Propofol and Versed drip for sedation, target RASS -2, with daily spontaneous awakening trial.  Fentanyl PRN pain, gtt as needed  · Head of bed 30 degrees or higher at all times  · Daily spontaneous breathing trial once PEEP less than 8, FiO2 less than 55%.   · IV fluid normal saline  · Rally pack  · Add valium 10 TID   · Home Seroquel   · Follow CK   · QTc 426 this am   · Tube feed at goal Jevity 40 ml/hr  · GI prophylaxis: Pepcid  · DVT prophylaxis: Lovenox  · MRSA prophylaxis: Bactroban  · I discussed care plan with family at the bedside and multiple good questions were answered.                          Total critical care time caring for this patient with life threatening, unstable organ failure, including direct patient contact, management of life support systems, review of data including imaging and labs, discussions with other team members and physicians is 34 minutes, excluding procedures

## 2019-04-18 NOTE — PROGRESS NOTES
D: Patient continues to kick his legs and trying to sit up in bed, pulling against restraints. A: Increased fentanyl according to dr clements, see MAR.

## 2019-04-18 NOTE — PROGRESS NOTES
Nutrition Assessment (Enteral Nutrition)    Type and Reason for Visit: Reassess    Nutrition Recommendations:   1. Continue Jevity 1.5 at goal rate of 40 ml/hr x 20 hours + one proteinex 2go once daily + water flushes of 30 ml every 6 hours or per MD guidance. 2. Monitor TF rate, intake, and tolerance + administration of one proteinex 2go once daily + water flushes. 3. Monitor vent status, behavior/mental status, and plan of care. 4. Monitor for in-patient weight changes. 5. Monitor nutrition-related labs and bowel function/regimen. Nutrition Assessment: patient's nutritional status has improved during this admission AEB receiving EN at goal rate and 100% of his current nutrition needs being met, however, he remains at risk for further compromise d/t intubation status, agitation when sedation is lightened or turned off, and need for valium + seroquel medications to be added today to help keep patient calm; will continue Jevity 1.5 at goal rate of 40 ml/hr x 20 hours + 30 ml water flushes every 6 hours + one proteinex 2go once daily    Malnutrition Assessment:  · Malnutrition Status: At risk for malnutrition  · Context: Acute illness or injury  · Findings of the 6 clinical characteristics of malnutrition (Minimum of 2 out of 6 clinical characteristics is required to make the diagnosis of moderate or severe Protein Calorie Malnutrition based on AND/ASPEN Guidelines):  1. Energy Intake-Unable to assess, Unable to assess    2. Weight Loss-No significant weight loss, in 6 months  3. Fat Loss-No significant subcutaneous fat loss,    4. Muscle Loss-No significant muscle mass loss,    5. Fluid Accumulation-No significant fluid accumulation,    6.  Strength-Not measured    Nutrition Risk Level:  Moderate    Nutrition Needs:  · Estimated Daily Total Kcal: 1720 - 2150 kcals based on 20-25 kcals/kg/CBW  · Estimated Daily Protein (g): 94 - 109 g protein based on 1.2-1.4 g/kg/IBW  · Estimated Daily Fluid (ml/day): 1700 - 2100 ml     Nutrition Diagnosis:   · Problem: Inadequate oral intake  · Etiology: related to Impaired respiratory function-inability to consume food, Insufficient energy/nutrient consumption, Psychological cause/life stress     Signs and symptoms:  as evidenced by NPO status due to medical condition, Intubation, Nutrition support - EN, Lab values    Objective Information:  · Nutrition-Focused Physical Findings: patient was becoming agitated this am prior to rounds starting; his legs were tense and his brow was furrowed as his agitation increased; ICU team helped manage patient while pharmacy + RN gave patient additional medications to help sedate him more; patient's mother was at bedside this am; + rally pack continued today - patient received 1 day of rally pack already; TG check in am; + low-grade fever  · Wound Type: None  · Current Nutrition Therapies:  · Oral Diet Orders: NPO   · Oral Diet intake: NPO  · Oral Nutrition Supplement (ONS) Orders: None  · ONS intake: NPO  · Tube Feeding (TF) Orders:   · Feeding Route: Orogastric  · Formula: 1.5 Calorie with Fiber  · Rate (ml/hr):40 ml/hr     · Volume (ml/day): 800 ml   · Duration: Continuous  · Additives/Modulars: Protein(one proteinex 2go once daily)  · Water Flushes: 30 ml every 6 hours or per MD guidance  · Current TF & Flush Orders Provides: TF infusing at goal rate   · Goal TF & Flush Orders Provides: Jevity 1.5 with a goal rate of 40 ml/hr x 20 hours = 800 ml TV, 1200 kcals, 64 g protein, and 760 ml free water + 26 g protein and 104 kcals from one proteinex 2go once daily (90 g protein and 904 kcals total) + 30 ml water flushes every 6 hours or per MD guidance  · Additional Calories: propofol at 50 mcg x 24 hours = 681 kcals from lipids  · Anthropometric Measures:  · Ht: 5' 11\" (180.3 cm)   · Current Body Wt: 190 lb 1 oz (86.2 kg)  · Admission Body Wt: 190 lb 1 oz (86.2 kg)  · Usual Body Wt: (190's, per past weights)  · Weight Change: no changes   · Ideal Body Wt: 172 lb (78 kg), % Ideal Body 110%  · BMI Classification: BMI 25.0 - 29.9 Overweight    Nutrition Interventions:   Continue NPO, Continue current Tube Feeding  Continued Inpatient Monitoring, Coordination of Care, Coordination of Community Care    Nutrition Evaluation:   · Evaluation: (goal is ongoing)   · Goals: patient will tolerate Jevity 1.5 at goal rate of 40 ml/hr x 20 hours + one proteinex 2go once daily without GI distress, without s/s of aspiration, and without additional lab/fluid disturbances; weight will remain stable during remainder of admission   · Monitoring: TF Intake, TF Tolerance, Skin Integrity, Mental Status/Confusion, Weight, Pertinent Labs, Monitor Bowel Function      Electronically signed by Daya Ronquillo RD, MABEL on 4/18/19 at 11:46 AM    Contact Number: [phone]

## 2019-04-18 NOTE — PROGRESS NOTES
D: Patient is now calm, respirations are even and easy, heart rate is WNL, mother remains at bedside.

## 2019-04-19 ENCOUNTER — APPOINTMENT (OUTPATIENT)
Dept: GENERAL RADIOLOGY | Age: 39
DRG: 052 | End: 2019-04-19
Payer: COMMERCIAL

## 2019-04-19 PROBLEM — J15.1 PNEUMONIA OF LEFT LOWER LOBE DUE TO PSEUDOMONAS SPECIES (HCC): Status: ACTIVE | Noted: 2019-04-19

## 2019-04-19 LAB
ANION GAP SERPL CALCULATED.3IONS-SCNC: 8 MMOL/L (ref 3–16)
BASE EXCESS ARTERIAL: 4.2 MMOL/L (ref -3–3)
BASOPHILS ABSOLUTE: 0 K/UL (ref 0–0.2)
BASOPHILS RELATIVE PERCENT: 0.3 %
BUN BLDV-MCNC: 8 MG/DL (ref 7–20)
CALCIUM SERPL-MCNC: 8.5 MG/DL (ref 8.3–10.6)
CARBOXYHEMOGLOBIN ARTERIAL: 0.5 % (ref 0–1.5)
CHLORIDE BLD-SCNC: 100 MMOL/L (ref 99–110)
CO2: 28 MMOL/L (ref 21–32)
CREAT SERPL-MCNC: 0.9 MG/DL (ref 0.9–1.3)
EOSINOPHILS ABSOLUTE: 0.1 K/UL (ref 0–0.6)
EOSINOPHILS RELATIVE PERCENT: 0.8 %
GFR AFRICAN AMERICAN: >60
GFR NON-AFRICAN AMERICAN: >60
GLUCOSE BLD-MCNC: 106 MG/DL (ref 70–99)
GLUCOSE BLD-MCNC: 127 MG/DL (ref 70–99)
GLUCOSE BLD-MCNC: 144 MG/DL (ref 70–99)
GLUCOSE BLD-MCNC: 89 MG/DL (ref 70–99)
HCO3 ARTERIAL: 27.9 MMOL/L (ref 21–29)
HCT VFR BLD CALC: 33.2 % (ref 40.5–52.5)
HEMOGLOBIN, ART, EXTENDED: 11.7 G/DL (ref 13.5–17.5)
HEMOGLOBIN: 11.4 G/DL (ref 13.5–17.5)
LYMPHOCYTES ABSOLUTE: 1 K/UL (ref 1–5.1)
LYMPHOCYTES RELATIVE PERCENT: 13.8 %
MAGNESIUM: 1.5 MG/DL (ref 1.8–2.4)
MCH RBC QN AUTO: 32 PG (ref 26–34)
MCHC RBC AUTO-ENTMCNC: 34.4 G/DL (ref 31–36)
MCV RBC AUTO: 92.9 FL (ref 80–100)
METHEMOGLOBIN ARTERIAL: 0.3 %
MONOCYTES ABSOLUTE: 0.6 K/UL (ref 0–1.3)
MONOCYTES RELATIVE PERCENT: 7.9 %
NEUTROPHILS ABSOLUTE: 5.7 K/UL (ref 1.7–7.7)
NEUTROPHILS RELATIVE PERCENT: 77.2 %
O2 CONTENT ARTERIAL: 16 ML/DL
O2 SAT, ARTERIAL: 96.9 %
O2 THERAPY: ABNORMAL
PCO2 ARTERIAL: 38.6 MMHG (ref 35–45)
PDW BLD-RTO: 13.6 % (ref 12.4–15.4)
PERFORMED ON: ABNORMAL
PERFORMED ON: ABNORMAL
PERFORMED ON: NORMAL
PH ARTERIAL: 7.48 (ref 7.35–7.45)
PHOSPHORUS: 2.5 MG/DL (ref 2.5–4.9)
PLATELET # BLD: 232 K/UL (ref 135–450)
PMV BLD AUTO: 6.4 FL (ref 5–10.5)
PO2 ARTERIAL: 83.5 MMHG (ref 75–108)
POTASSIUM SERPL-SCNC: 3.7 MMOL/L (ref 3.5–5.1)
RBC # BLD: 3.58 M/UL (ref 4.2–5.9)
SODIUM BLD-SCNC: 136 MMOL/L (ref 136–145)
TCO2 ARTERIAL: 29.1 MMOL/L
TOTAL CK: 337 U/L (ref 39–308)
TRIGL SERPL-MCNC: 150 MG/DL (ref 0–150)
WBC # BLD: 7.3 K/UL (ref 4–11)

## 2019-04-19 PROCEDURE — 2580000003 HC RX 258: Performed by: INTERNAL MEDICINE

## 2019-04-19 PROCEDURE — 31645 BRNCHSC W/THER ASPIR 1ST: CPT | Performed by: INTERNAL MEDICINE

## 2019-04-19 PROCEDURE — 87040 BLOOD CULTURE FOR BACTERIA: CPT

## 2019-04-19 PROCEDURE — 87070 CULTURE OTHR SPECIMN AEROBIC: CPT

## 2019-04-19 PROCEDURE — 6360000002 HC RX W HCPCS: Performed by: INTERNAL MEDICINE

## 2019-04-19 PROCEDURE — 87185 SC STD ENZYME DETCJ PER NZM: CPT

## 2019-04-19 PROCEDURE — 94750 HC PULMONARY COMPLIANCE STUDY: CPT

## 2019-04-19 PROCEDURE — 71045 X-RAY EXAM CHEST 1 VIEW: CPT

## 2019-04-19 PROCEDURE — 6370000000 HC RX 637 (ALT 250 FOR IP): Performed by: PHYSICIAN ASSISTANT

## 2019-04-19 PROCEDURE — 0B9J8ZX DRAINAGE OF LEFT LOWER LUNG LOBE, VIA NATURAL OR ARTIFICIAL OPENING ENDOSCOPIC, DIAGNOSTIC: ICD-10-PCS | Performed by: INTERNAL MEDICINE

## 2019-04-19 PROCEDURE — 84478 ASSAY OF TRIGLYCERIDES: CPT

## 2019-04-19 PROCEDURE — 31624 DX BRONCHOSCOPE/LAVAGE: CPT | Performed by: INTERNAL MEDICINE

## 2019-04-19 PROCEDURE — 2580000003 HC RX 258: Performed by: PHYSICIAN ASSISTANT

## 2019-04-19 PROCEDURE — 87205 SMEAR GRAM STAIN: CPT

## 2019-04-19 PROCEDURE — 85025 COMPLETE CBC W/AUTO DIFF WBC: CPT

## 2019-04-19 PROCEDURE — 99232 SBSQ HOSP IP/OBS MODERATE 35: CPT | Performed by: INTERNAL MEDICINE

## 2019-04-19 PROCEDURE — 80048 BASIC METABOLIC PNL TOTAL CA: CPT

## 2019-04-19 PROCEDURE — 31622 DX BRONCHOSCOPE/WASH: CPT

## 2019-04-19 PROCEDURE — 2700000000 HC OXYGEN THERAPY PER DAY

## 2019-04-19 PROCEDURE — 84100 ASSAY OF PHOSPHORUS: CPT

## 2019-04-19 PROCEDURE — 2500000003 HC RX 250 WO HCPCS: Performed by: INTERNAL MEDICINE

## 2019-04-19 PROCEDURE — 6370000000 HC RX 637 (ALT 250 FOR IP): Performed by: INTERNAL MEDICINE

## 2019-04-19 PROCEDURE — 99291 CRITICAL CARE FIRST HOUR: CPT | Performed by: INTERNAL MEDICINE

## 2019-04-19 PROCEDURE — 94003 VENT MGMT INPAT SUBQ DAY: CPT

## 2019-04-19 PROCEDURE — 94762 N-INVAS EAR/PLS OXIMTRY CONT: CPT

## 2019-04-19 PROCEDURE — 36415 COLL VENOUS BLD VENIPUNCTURE: CPT

## 2019-04-19 PROCEDURE — 87801 DETECT AGNT MULT DNA AMPLI: CPT

## 2019-04-19 PROCEDURE — 83735 ASSAY OF MAGNESIUM: CPT

## 2019-04-19 PROCEDURE — 82550 ASSAY OF CK (CPK): CPT

## 2019-04-19 PROCEDURE — 2000000000 HC ICU R&B

## 2019-04-19 PROCEDURE — 6360000002 HC RX W HCPCS: Performed by: PHYSICIAN ASSISTANT

## 2019-04-19 PROCEDURE — 82803 BLOOD GASES ANY COMBINATION: CPT

## 2019-04-19 RX ORDER — CHLORHEXIDINE GLUCONATE 0.12 MG/ML
15 RINSE ORAL 2 TIMES DAILY
Status: DISCONTINUED | OUTPATIENT
Start: 2019-04-19 | End: 2019-04-25

## 2019-04-19 RX ORDER — CHLORHEXIDINE GLUCONATE 4 G/100ML
SOLUTION TOPICAL 2 TIMES DAILY
Status: DISCONTINUED | OUTPATIENT
Start: 2019-04-19 | End: 2019-04-19 | Stop reason: SDUPTHER

## 2019-04-19 RX ADMIN — PROPOFOL 50 MCG/KG/MIN: 10 INJECTION, EMULSION INTRAVENOUS at 07:47

## 2019-04-19 RX ADMIN — CEFEPIME 2 G: 2 INJECTION, POWDER, FOR SOLUTION INTRAVENOUS at 19:54

## 2019-04-19 RX ADMIN — PROPOFOL 50 MCG/KG/MIN: 10 INJECTION, EMULSION INTRAVENOUS at 13:50

## 2019-04-19 RX ADMIN — SENNOSIDES AND DOCUSATE SODIUM 2 TABLET: 8.6; 5 TABLET ORAL at 07:47

## 2019-04-19 RX ADMIN — SENNOSIDES AND DOCUSATE SODIUM 2 TABLET: 8.6; 5 TABLET ORAL at 20:01

## 2019-04-19 RX ADMIN — SODIUM CHLORIDE: 9 INJECTION, SOLUTION INTRAVENOUS at 00:50

## 2019-04-19 RX ADMIN — SODIUM CHLORIDE: 9 INJECTION, SOLUTION INTRAVENOUS at 07:46

## 2019-04-19 RX ADMIN — PROPOFOL 50 MCG/KG/MIN: 10 INJECTION, EMULSION INTRAVENOUS at 17:48

## 2019-04-19 RX ADMIN — FENTANYL CITRATE 200 MCG/HR: 50 INJECTION, SOLUTION INTRAMUSCULAR; INTRAVENOUS at 03:01

## 2019-04-19 RX ADMIN — MIDAZOLAM HYDROCHLORIDE 13 MG/HR: 5 INJECTION, SOLUTION INTRAMUSCULAR; INTRAVENOUS at 05:45

## 2019-04-19 RX ADMIN — MAGNESIUM SULFATE HEPTAHYDRATE 1 G: 1 INJECTION, SOLUTION INTRAVENOUS at 06:34

## 2019-04-19 RX ADMIN — PROPOFOL 50 MCG/KG/MIN: 10 INJECTION, EMULSION INTRAVENOUS at 03:02

## 2019-04-19 RX ADMIN — PROPOFOL 50 MCG/KG/MIN: 10 INJECTION, EMULSION INTRAVENOUS at 00:49

## 2019-04-19 RX ADMIN — Medication 10 ML: at 20:02

## 2019-04-19 RX ADMIN — PROPOFOL 50 MCG/KG/MIN: 10 INJECTION, EMULSION INTRAVENOUS at 21:16

## 2019-04-19 RX ADMIN — FENTANYL CITRATE 200 MCG/HR: 50 INJECTION, SOLUTION INTRAMUSCULAR; INTRAVENOUS at 19:57

## 2019-04-19 RX ADMIN — ACETAMINOPHEN 650 MG: 325 TABLET ORAL at 18:12

## 2019-04-19 RX ADMIN — MIDAZOLAM HYDROCHLORIDE 13 MG/HR: 5 INJECTION, SOLUTION INTRAMUSCULAR; INTRAVENOUS at 21:17

## 2019-04-19 RX ADMIN — DIAZEPAM 10 MG: 10 TABLET ORAL at 13:24

## 2019-04-19 RX ADMIN — MUPIROCIN: 20 OINTMENT TOPICAL at 20:02

## 2019-04-19 RX ADMIN — DIAZEPAM 10 MG: 10 TABLET ORAL at 20:01

## 2019-04-19 RX ADMIN — MIDAZOLAM HYDROCHLORIDE 13 MG/HR: 5 INJECTION, SOLUTION INTRAMUSCULAR; INTRAVENOUS at 13:50

## 2019-04-19 RX ADMIN — CEFEPIME 2 G: 2 INJECTION, POWDER, FOR SOLUTION INTRAVENOUS at 13:24

## 2019-04-19 RX ADMIN — MUPIROCIN: 20 OINTMENT TOPICAL at 07:48

## 2019-04-19 RX ADMIN — ACETAMINOPHEN 650 MG: 325 TABLET ORAL at 05:50

## 2019-04-19 RX ADMIN — MAGNESIUM SULFATE HEPTAHYDRATE 1 G: 1 INJECTION, SOLUTION INTRAVENOUS at 07:47

## 2019-04-19 RX ADMIN — PROPOFOL 50 MCG/KG/MIN: 10 INJECTION, EMULSION INTRAVENOUS at 06:33

## 2019-04-19 RX ADMIN — ENOXAPARIN SODIUM 40 MG: 40 INJECTION SUBCUTANEOUS at 07:47

## 2019-04-19 RX ADMIN — QUETIAPINE FUMARATE 50 MG: 25 TABLET ORAL at 07:47

## 2019-04-19 RX ADMIN — VANCOMYCIN HYDROCHLORIDE 1250 MG: 1 INJECTION, POWDER, LYOPHILIZED, FOR SOLUTION INTRAVENOUS at 13:23

## 2019-04-19 RX ADMIN — DIAZEPAM 10 MG: 10 TABLET ORAL at 07:47

## 2019-04-19 RX ADMIN — DEXMEDETOMIDINE HYDROCHLORIDE 0.2 MCG/KG/HR: 100 INJECTION, SOLUTION INTRAVENOUS at 03:01

## 2019-04-19 RX ADMIN — Medication 10 ML: at 07:46

## 2019-04-19 RX ADMIN — FENTANYL CITRATE 200 MCG/HR: 50 INJECTION, SOLUTION INTRAMUSCULAR; INTRAVENOUS at 14:00

## 2019-04-19 RX ADMIN — FENTANYL CITRATE 200 MCG/HR: 50 INJECTION, SOLUTION INTRAMUSCULAR; INTRAVENOUS at 08:48

## 2019-04-19 RX ADMIN — SODIUM CHLORIDE: 9 INJECTION, SOLUTION INTRAVENOUS at 21:16

## 2019-04-19 RX ADMIN — THIAMINE HYDROCHLORIDE: 100 INJECTION, SOLUTION INTRAMUSCULAR; INTRAVENOUS at 09:29

## 2019-04-19 RX ADMIN — CHLORHEXIDINE GLUCONATE 0.12% ORAL RINSE 15 ML: 1.2 LIQUID ORAL at 20:01

## 2019-04-19 RX ADMIN — DEXMEDETOMIDINE HYDROCHLORIDE 0.4 MCG/KG/HR: 100 INJECTION, SOLUTION INTRAVENOUS at 18:00

## 2019-04-19 ASSESSMENT — PULMONARY FUNCTION TESTS
PIF_VALUE: 23
PIF_VALUE: 19
PIF_VALUE: 18
PIF_VALUE: 15
PIF_VALUE: 21
PIF_VALUE: 17
PIF_VALUE: 16
PIF_VALUE: 23
PIF_VALUE: 23
PIF_VALUE: 24
PIF_VALUE: 16
PIF_VALUE: 15
PIF_VALUE: 23
PIF_VALUE: 22
PIF_VALUE: 17
PIF_VALUE: 22
PIF_VALUE: 16
PIF_VALUE: 21
PIF_VALUE: 20
PIF_VALUE: 13
PIF_VALUE: 23
PIF_VALUE: 17
PIF_VALUE: 11
PIF_VALUE: 22

## 2019-04-19 ASSESSMENT — PAIN SCALES - GENERAL
PAINLEVEL_OUTOF10: 0
PAINLEVEL_OUTOF10: 10
PAINLEVEL_OUTOF10: 0

## 2019-04-19 NOTE — PROGRESS NOTES
Patient having frequent desats to mid 80's since bronchoscopy and requring frequent suctioning. Increased fio2 from 25% to 40%. sp02 92% at this time.

## 2019-04-19 NOTE — PROGRESS NOTES
Protein supplement administered per OG. Pt tolerate well.  0 residual noted at this time. Will continue to monitor.   Alfredo Olguin RN

## 2019-04-19 NOTE — PROGRESS NOTES
04/19/19 1154   Vent Information   Vent Type 840   Vent Mode AC/VC   Vt Ordered 450 mL   Rate Set 14 bmp   Peak Flow 60 L/min   Pressure Support 0 cmH20   FiO2  25 %   Sensitivity 3   PEEP/CPAP 5   I Time/ I Time % 0 s   Cuff Pressure (cm H2O) 30 cm H2O   Humidification Source Heated wire   Humidification Temp 37   Circuit Condensation Drained   Vent Patient Data   High Peep/I Pressure 0   Peak Inspiratory Pressure 23 cmH2O   Mean Airway Pressure 10 cmH20   Rate Measured 18 br/min   Vt Exhaled 421 mL   Minute Volume 8.88 Liters   I:E Ratio 1:1.70   Cough/Sputum   Sputum How Obtained Suctioned;Endotracheal   Cough Productive   Sputum Amount Moderate   Sputum Color Dark Yellow   Tenacity Thick   Spontaneous Breathing Trial (SBT) RT Doc   Contraindications to SBT? Body temperature > 38.5°C   Pulse 92   SpO2 94 %   Breath Sounds   Right Upper Lobe Rhonchi   Right Middle Lobe Diminished   Right Lower Lobe Diminished   Left Upper Lobe Rhonchi   Left Lower Lobe Diminished   Additional Respiratory  Assessments   Resp 18   Position Semi-Contreras's   Oral Care Completed? Yes   Oral Care Mouth suctioned   Subglottic Suction Done? Yes   Alarm Settings   High Pressure Alarm 40 cmH2O   Low Minute Volume Alarm 4 L/min   High Respiratory Rate 40 br/min   Low Exhaled Vt  300 mL   Patient Observation   Observations ETT SIZE 7.5, SECURED AT 25 LIP LINE. AMBU BAG AT HEAD OF BED. WATER BAG GOOD. ETT (adult)   Placement Date/Time: 04/16/19 1542   Timeout: Patient;Procedure;Site/Side;Appropriate Equipment  Preoxygenation: Yes  Mask Ventilation: Ventilated by mask (1)  Technique: Rapid sequence  Type: Cuffed  Tube Size: 8 mm  Laryngoscope: GlideScope  Locatio. ..    Secured at 25 cm   Measured From Lips   ET Placement Right   Site Condition Dry   Cuff Pressure 30 cm H2O

## 2019-04-19 NOTE — PROGRESS NOTES
Report given to Elton Gamboa RN at bedside for transfer of care. Pt resting comfortably at this time, all needs met at time of transfer.   Devonte Olguin RN

## 2019-04-19 NOTE — PROGRESS NOTES
Pharmacy Note  Vancomycin Consult    Garfield Hussein is a 45 y.o. male started on Vancomycin for PNA; consult received from Dr. Thomas Johnson to manage therapy. Also receiving the following antibiotics: cefepime    Patient Active Problem List   Diagnosis    History of mixed drug abuse    Acute encephalopathy    Psychosis ([facility] [address]    Agitation    Polysubstance abuse (HCC)    Acute respiratory failure (HCC)    Paranoia (HCC)    Combative behavior    Disorder of electrolytes    Sinus tachycardia    Altered mental status       Allergies:  Patient has no known allergies. Temp max: 103.1    Recent Labs     04/18/19  0510 04/19/19  0530   BUN 12 8       Recent Labs     04/18/19  0510 04/19/19  0530   CREATININE 0.7* 0.9       Recent Labs     04/18/19  0510 04/19/19  0530   WBC 6.2 7.3         Intake/Output Summary (Last 24 hours) at 4/19/2019 1138  Last data filed at 4/19/2019 0954  Gross per 24 hour   Intake 2569 ml   Output 3025 ml   Net -456 ml       Culture Date      Source                       Results  NGTD    Ht Readings from Last 1 Encounters:   04/18/19 5' 11\" (1.803 m)        Wt Readings from Last 1 Encounters:   04/16/19 190 lb 1.6 oz (86.2 kg)         Body mass index is 26.51 kg/m². Estimated Creatinine Clearance: 119 mL/min (based on SCr of 0.9 mg/dL). Goal Trough Level: 15-18mcg/mL    Assessment/Plan:  Will initiate vancomycin 1250 mg IV every 12 hours. Trough due on 20th at 2330, order placed for Cefepime 2gm q8h    Thank you for the consult. Will continue to follow.   Bobbi Conteh Pharm D 3/59/[id number] AM  .

## 2019-04-19 NOTE — PROGRESS NOTES
Progress Note    Admit Date:  4/15/2019    Admitted with agitation, acute resp failure     Per pt's mother , he has never acted this way in the past. His father is bipolar and his mother reports that the patient has gone through rehab before but has never seen a psychiatrist before, but is very concerned with his behaviors stating that he had been sober for 8 months and doing well. Subjective:  Mr. Merissa Ying was very agitated, required heavy sedation, intubated for airway protection    on Mech vent    on propofol, versed and fentanyl gtt   sats stable     4/19: still very agitated. spiked temps to 103 F   plan bronch today. blood cultures sent     IVabx started     Objective:   BP 99/62   Pulse 88   Temp 100.5 °F (38.1 °C) (Axillary)   Resp 16   Ht 5' 11\" (1.803 m)   Wt 190 lb 1.6 oz (86.2 kg)   SpO2 100%   BMI 26.51 kg/m²       Intake/Output Summary (Last 24 hours) at 4/19/2019 1416  Last data filed at 4/19/2019 1300  Gross per 24 hour   Intake 2569 ml   Output 3400 ml   Net -831 ml         Physical Exam:  General:  Intubated, sedated   Skin:  Warm and dry  Neck:  JVD absent. Neck supple  Chest:  Clear to auscultation, respiration easy. No wheezes, rales or rhonchi. Cardiovascular:  RRR ,S1S2 normal  Abdomen:  Soft, non tender, non distended, BS +  Extremities:  No edema. Intact peripheral pulses.  Brisk cap refill, < 2 secs  Neuro: non focal      Medications:   Scheduled Meds:   vancomycin  1,250 mg Intravenous Q12H    cefepime  2 g Intravenous Q8H    chlorhexidine  15 mL Mouth/Throat BID    fentanNYL  50 mcg Intravenous Once    midazolam  2 mg Intravenous Once    diazepam  10 mg Oral TID    sennosides-docusate sodium  2 tablet Oral BID    folic acid, thiamine, multi-vitamin with vitamin K infusion   Intravenous Once    sodium chloride flush  10 mL Intravenous 2 times per day    enoxaparin  40 mg Subcutaneous Daily    mupirocin   Nasal BID       Continuous Infusions:   dexmedetomidine prior   study 10/01/2016. XR CHEST PORTABLE    (Results Pending)         Assessment:  Active Problems:    History of mixed drug abuse    Acute encephalopathy    Psychosis (HCC)    Agitation    Polysubstance abuse (HCC)    Acute respiratory failure (HCC)    Paranoia (HCC)    Combative behavior    Disorder of electrolytes    Sinus tachycardia    Altered mental status  Resolved Problems:    * No resolved hospital problems. *      Plan:    Acute encephalopathy  - suspect related to substance abuse based on patient's history, yet etiology is not entirely clear  - UDS+ THC only   - head CT without acute findings  - initially admitted to medical floor but became severely agitated, code violet called, and transferred to ICU  - Multiple sedating meds administered in ER, see MAR. Hald 5 mg IM and 25 mg IV benadryl now   -  on propofol, versed and fentanyl gtt  - Valium TID, precedex gtt started 4/18  - cont PRN ativan   - intensivist c/s  - psychiatry c/s     acute resp failure  - intubated for airway protection  - pt requiring heavy sedation      acute febrile illness  - likely aspiration PNA  - increased resp secretions  - bronch today . Check cultures  - started on vanc and cefepime     Polysubstance abuse  - UDS + THC only today  - based on EMR- patient with prior fentanyl abuse      Hypokalemia  - replaced in ER, monitor BMP       DVT Prophylaxis: Lovenox   DIET TUBE FEED CONTINUOUS/CYCLIC NPO; 1.5 Calorie with Fiber (Jevity 1.5);  Orogastric; Continuous; 20; 40; 20  Dietary Nutrition Supplements: Protein Modular  Code Status: Full Code        Angela Lyon MD  4/19/2019 2:16 PM 36.3

## 2019-04-19 NOTE — PROGRESS NOTES
Assisted Dr Jodee Cruz with bronchoscopy, pt placed in PC fi02 100% during procedure and tolerated well. Switched back to previous settings post bronchoscopy.

## 2019-04-19 NOTE — PROCEDURES
PROCEDURE:  BRONCHOSCOPY WITH BRONCHOALVEOLAR LAVAGE    PRE-PROCEDURE EVALUATION:  Nursing History and Physical reviewed and agreed upon.     Current Facility-Administered Medications: vancomycin (VANCOCIN) 1,250 mg in dextrose 5 % 250 mL IVPB, 1,250 mg, Intravenous, Q12H  cefepime (MAXIPIME) 2 g IVPB minibag, 2 g, Intravenous, Q8H  chlorhexidine (PERIDEX) 0.12 % solution 15 mL, 15 mL, Mouth/Throat, BID  fentaNYL (SUBLIMAZE) injection 50 mcg, 50 mcg, Intravenous, Once  midazolam (VERSED) injection 2 mg, 2 mg, Intravenous, Once  diazepam (VALIUM) tablet 10 mg, 10 mg, Oral, TID  sennosides-docusate sodium (SENOKOT-S) 8.6-50 MG tablet 2 tablet, 2 tablet, Oral, BID  fentaNYL (SUBLIMAZE) injection 50 mcg, 50 mcg, Intravenous, Q1H PRN  midazolam (VERSED) injection 2 mg, 2 mg, Intravenous, Q1H PRN  dexmedetomidine (PRECEDEX) 400 mcg in sodium chloride 0.9 % 100 mL infusion, 0.2 mcg/kg/hr, Intravenous, Continuous  glucose (GLUTOSE) 40 % oral gel 15 g, 15 g, Oral, PRN  dextrose 50 % solution 12.5 g, 12.5 g, Intravenous, PRN  glucagon (rDNA) injection 1 mg, 1 mg, Intramuscular, PRN  dextrose 5 % solution, 100 mL/hr, Intravenous, PRN  fentaNYL (SUBLIMAZE) 1,000 mcg in sodium chloride 0.9 % 100 mL infusion, 25 mcg/hr, Intravenous, Continuous  medicated lip balm (BLISTEX/CARMEX) stick, , Topical, PRN  sodium chloride 0.9 % 226 mL with folic acid 1 mg, adult multi-vitamin with vitamin k 10 mL, thiamine 100 mg, , Intravenous, Once  sodium chloride flush 0.9 % injection 10 mL, 10 mL, Intravenous, 2 times per day  sodium chloride flush 0.9 % injection 10 mL, 10 mL, Intravenous, PRN  magnesium hydroxide (MILK OF MAGNESIA) 400 MG/5ML suspension 30 mL, 30 mL, Oral, Daily PRN  ondansetron (ZOFRAN) injection 4 mg, 4 mg, Intravenous, Q6H PRN  enoxaparin (LOVENOX) injection 40 mg, 40 mg, Subcutaneous, Daily  potassium chloride (KLOR-CON M) extended release tablet 40 mEq, 40 mEq, Oral, PRN **OR** potassium bicarb-citric acid (EFFER-K) effervescent tablet 40 mEq, 40 mEq, Oral, PRN **OR** potassium chloride 10 mEq/100 mL IVPB (Peripheral Line), 10 mEq, Intravenous, PRN  magnesium sulfate 1 g in dextrose 5% 100 mL IVPB, 1 g, Intravenous, PRN  acetaminophen (TYLENOL) tablet 650 mg, 650 mg, Oral, Q4H PRN  [] traMADol (ULTRAM) tablet 50 mg, 50 mg, Oral, PRN **AND** cloNIDine (CATAPRES) tablet 0.1 mg, 0.1 mg, Oral, PRN  mupirocin (BACTROBAN) 2 % ointment, , Nasal, BID  0.9 % sodium chloride infusion, , Intravenous, Continuous  propofol 1000 MG/100ML injection, 10 mcg/kg/min, Intravenous, Titrated  midazolam (VERSED) 100 mg in dextrose 5 % 100 mL infusion, 1 mg/hr, Intravenous, Continuous  Allergies and medications have been reviewed. HENT: Airway patent and reviewed, ETT in place. Cardiovascular: Normal rate, regular rhythm, normal heart sounds. Pulmonary/Chest: No wheezes. + rhonchi. No rales. Abdominal: Soft. Bowel sounds are normal. No distension. ASA CLASS      IV. Severe Systemic Disease which is a threat to life. Sedation plan:    Continue ICU sedation    Post Procedure Plan   Return to same level of care     The risks and benefits as well as alternatives to the procedure have been discussed with the patient and or family. The patient and or next of kin understands and agrees to proceed. DESCRIPTION OF PROCEDURE: A time out was taken. Type of sedation used: Moderate Sedation  Physician/patient face-to-face sedation start time: 02:30 pm  Physician/patient face-to-face sedation stop time: 02:55 pm  Total moderate sedation time in minutes: 25 minutes  Patient was monitored continuously 1:1 throughout the entire procedure while sedation was administere. Moderate sedation with propofol gtt. The scope was passed with ease via the mouth. The vocal cords looked normal. Topical lidocaine was used on the vocal cords and inside the bronchial tree. A complete airway inspection was performed.   No endobronchial lesions were identified. There were thick, purulent secretions throughout the bilateral lower lobe airways. Therapeutic aspiration was performed. Washings were obtained throughout the airways. A Bronchoalveolar lavage was obtained from the left lower lobe with good return. Estimated blood loss <5ml. The patient tolerated the procedure well. Recovery will be per ICU. FOLLOW UP:  Cultures in  three to five days.

## 2019-04-19 NOTE — PROGRESS NOTES
acid, thiamine, multi-vitamin with vitamin K infusion   Intravenous Daily    sennosides-docusate sodium  2 tablet Oral BID    folic acid, thiamine, multi-vitamin with vitamin K infusion   Intravenous Once    sodium chloride flush  10 mL Intravenous 2 times per day    enoxaparin  40 mg Subcutaneous Daily    mupirocin   Nasal BID     PRN Meds:  fentanNYL, midazolam, glucose, dextrose, glucagon (rDNA), dextrose, medicated lip balm, sodium chloride flush, magnesium hydroxide, ondansetron, potassium chloride **OR** potassium alternative oral replacement **OR** potassium chloride, magnesium sulfate, acetaminophen, traMADol **AND** cloNIDine    Results:  CBC:   Recent Labs     04/17/19  0532 04/18/19  0510 04/19/19  0530   WBC 6.2 6.2 7.3   HGB 11.5* 11.6* 11.4*   HCT 33.5* 34.0* 33.2*   MCV 93.0 91.3 92.9    266 232     BMP:   Recent Labs     04/17/19  0532 04/18/19  0510 04/19/19  0530   * 138 136   K 3.9  3.9 3.7 3.7    102 100   CO2 26 28 28   PHOS 4.1 3.9 2.5   BUN 16 12 8   CREATININE 0.8* 0.7* 0.9     LIVER PROFILE: No results for input(s): AST, ALT, LIPASE, BILIDIR, BILITOT, ALKPHOS in the last 72 hours. Invalid input(s): AMYLASE,  ALB  PT/INR: No results for input(s): PROTIME, INR in the last 72 hours. APTT: No results for input(s): APTT in the last 72 hours. UA:No results for input(s): NITRITE, COLORU, PHUR, LABCAST, WBCUA, RBCUA, MUCUS, TRICHOMONAS, YEAST, BACTERIA, CLARITYU, SPECGRAV, LEUKOCYTESUR, UROBILINOGEN, BILIRUBINUR, BLOODU, GLUCOSEU, AMORPHOUS in the last 72 hours. Invalid input(s): Shakila Eusebio    Cultures:  Bld: pending    Films:  CXR no new    Assessment:  · Acute respiratory failure with hypoxemia  · Acute encephalopathy  · Agitation  · polysubstance abuse  · electrolyte disturbance  · New fever      Plan:  · Mechanical ventilation per my orders. The ventilator was adjusted by me at the bedside for unstable, life threatening respiratory failure.  Wean oxygen as tolerated. · Iv sedation -precedex, fentanyl 200, versed 13/hr; propofol 50  · Daily sat/sbt  · Valium 10 mg tid  · seroquel 50 bid- hold for now ? Serotonin syndrome with [place] wort. · Tube feeds  · Replaced Magnesium  · F/u cultures  · Abx: start vanc and cefepime  · May need bronchoscopy for fever and increased secretions  · cxr now  · ICU care- lovenox and bactroban, pepcid    Critical care time spent reviewing labs/films, examining patient, collaborating with other physicians but excluding procedures for life threatening organ failure is 32 minutes.

## 2019-04-19 NOTE — PROGRESS NOTES
04/18/19 2321   Vent Information   Vent Type 840   Vent Mode AC/VC   Vt Ordered 450 mL   Rate Set 14 bmp   Peak Flow 60 L/min   Pressure Support 0 cmH20   FiO2  25 %   Sensitivity 3   PEEP/CPAP 5   I Time/ I Time % 0 s   Humidification Source Heated wire   Humidification Temp 36.8   Humidification Temp Measured 36.8   Circuit Condensation Drained   Vent Patient Data   High Peep/I Pressure 0   Peak Inspiratory Pressure 19 cmH2O   Mean Airway Pressure 9.3 cmH20   Rate Measured 16 br/min   Vt Exhaled 488 mL   Minute Volume 7.73 Liters   I:E Ratio 1:4.20   Cough/Sputum   Sputum How Obtained Endotracheal;Suctioned   Cough Productive   Sputum Amount Small   Sputum Color Creamy   Tenacity Thick   Spontaneous Breathing Trial (SBT) RT Doc   Pulse 91   SpO2 98 %   Breath Sounds   Right Upper Lobe Diminished   Right Middle Lobe Diminished   Right Lower Lobe Diminished   Left Upper Lobe Diminished   Left Lower Lobe Diminished   Additional Respiratory  Assessments   Resp 17   Position Semi-Contreras's   Alarm Settings   High Pressure Alarm 40 cmH2O   Low Minute Volume Alarm 4 L/min   Apnea (secs) 20 secs   High Respiratory Rate 40 br/min   Patient Observation   Observations ETT SIZE 7.5, SECURED AT 25 LIP LINE. AMBU BAG AT HEAD OF BED. WATER BAG GOOD. ETT (adult)   Placement Date/Time: 04/16/19 1542   Timeout: Patient;Procedure;Site/Side;Appropriate Equipment  Preoxygenation: Yes  Mask Ventilation: Ventilated by mask (1)  Technique: Rapid sequence  Type: Cuffed  Tube Size: 8 mm  Laryngoscope: GlideScope  Locatio. ..    Secured at 25 cm   Measured From Lips   ET Placement Right   Secured By Commercial tube pritchett   Site Condition Dry

## 2019-04-19 NOTE — PROGRESS NOTES
04/19/19 0756   Vent Information   Vent Type 840   Vent Mode AC/VC   Vt Ordered 450 mL   Rate Set 14 bmp   Peak Flow 60 L/min   Pressure Support 0 cmH20   FiO2  25 %   Sensitivity 3   PEEP/CPAP 5   I Time/ I Time % 0 s   Humidification Source Heated wire   Humidification Temp 36.8   Circuit Condensation Drained   Vent Patient Data   High Peep/I Pressure 0   Peak Inspiratory Pressure 15 cmH2O   Mean Airway Pressure 7.9 cmH20   Rate Measured 17 br/min   Vt Exhaled 487 mL   Minute Volume 8.2 Liters   I:E Ratio 1:2.90   Plateau Pressure 17 [id number]   Static Compliance 41 mL/cmH2O   Dynamic Compliance 48 mL/cmH2O   Cough/Sputum   Sputum How Obtained Suctioned;Endotracheal   Spontaneous Breathing Trial (SBT) RT Doc   Contraindications to SBT? Body temperature > 38.5°C   Pulse 96   SpO2 93 %   Breath Sounds   Right Upper Lobe Rhonchi   Right Middle Lobe Diminished   Right Lower Lobe Diminished   Left Upper Lobe Rhonchi   Left Lower Lobe Diminished   Additional Respiratory  Assessments   Resp 16   Position Semi-Contreras's   Oral Care Completed? Yes   Oral Care Mouth suctioned   Subglottic Suction Done? Yes   Alarm Settings   High Pressure Alarm 40 cmH2O   Delay Alarm 20 sec(s)   Low Minute Volume Alarm 4 L/min   High Respiratory Rate 40 br/min   Resp Rate Low Alarm 300   Patient Observation   Observations ETT SIZE 7.5, SECURED AT 25 LIP LINE. AMBU BAG AT HEAD OF BED. WATER BAG GOOD. ETT (adult)   Placement Date/Time: 04/16/19 1542   Timeout: Patient;Procedure;Site/Side;Appropriate Equipment  Preoxygenation: Yes  Mask Ventilation: Ventilated by mask (1)  Technique: Rapid sequence  Type: Cuffed  Tube Size: 8 mm  Laryngoscope: GlideScope  Locatio. ..    Secured at 25 cm   Measured From Lips   ET Placement Right   Secured By Commercial tube pritchett   Site Condition Dry

## 2019-04-19 NOTE — PROGRESS NOTES
Patient placed on spontaneous breathing trial at 0830 of 5/5 40%, RSBI 140, RR 32, desats to 88%, increased to 10/5 after 2 mins, again she had increased RR to 32, RSBI 110 and desat to 81%, increased to 15/5 and her RSBI came down to 79, RR stayed 32 and sp02 93% . Patient says she is doing ok, will leave her on spontaneous breathing trial until Dr. Savita Loco sees patient in rounds as long as she tolerates.

## 2019-04-19 NOTE — PROGRESS NOTES
04/19/19 0330   Vent Information   Vent Type 840   Vent Mode AC/VC   Vt Ordered 450 mL   Rate Set 14 bmp   Peak Flow 60 L/min   Pressure Support 0 cmH20   FiO2  25 %   Sensitivity 3   PEEP/CPAP 5   I Time/ I Time % 0 s   Humidification Source Heated wire   Humidification Temp 37   Humidification Temp Measured 37   Vent Patient Data   High Peep/I Pressure 0   Peak Inspiratory Pressure 22 cmH2O   Mean Airway Pressure 9.7 cmH20   Rate Measured 19 br/min   Vt Exhaled 475 mL   Minute Volume 9.35 Liters   I:E Ratio 1:2.10   Plateau Pressure 18 [id number]   Cough/Sputum   Sputum How Obtained Suctioned;Endotracheal   Cough Productive   Sputum Amount Moderate   Sputum Color Creamy   Tenacity Thick   Spontaneous Breathing Trial (SBT) RT Doc   Pulse 95   SpO2 94 %   Breath Sounds   Right Upper Lobe Rhonchi   Right Middle Lobe Diminished   Right Lower Lobe Diminished   Left Upper Lobe Rhonchi   Left Lower Lobe Diminished   Additional Respiratory  Assessments   Resp 17   Position Semi-Contreras's   Oral Care Completed? Yes   Oral Care Mouth suctioned   Subglottic Suction Done? Yes   Alarm Settings   High Pressure Alarm 40 cmH2O   Low Minute Volume Alarm 4 L/min   Apnea (secs) 20 secs   High Respiratory Rate 40 br/min   Low Exhaled Vt  300 mL   ETT (adult)   Placement Date/Time: 04/16/19 1542   Timeout: Patient;Procedure;Site/Side;Appropriate Equipment  Preoxygenation: Yes  Mask Ventilation: Ventilated by mask (1)  Technique: Rapid sequence  Type: Cuffed  Tube Size: 8 mm  Laryngoscope: GlideScope  Locatio. ..    Secured at 25 cm   Measured From Lips   ET Placement Right   Secured By Commercial tube pritchett   Site Condition Dry

## 2019-04-19 NOTE — PROGRESS NOTES
04/19/19 1454   Vent Information   Vent Type 840   Vent Mode AC/VC   Vt Ordered 450 mL   Rate Set 14 bmp   Peak Flow 60 L/min   Pressure Support 0 cmH20   FiO2  25 %   Sensitivity 3   PEEP/CPAP 5   I Time/ I Time % 0 s   Circuit Condensation Drained   Vent Patient Data   High Peep/I Pressure 0   Peak Inspiratory Pressure 24 cmH2O   Mean Airway Pressure 8.1 cmH20   Rate Measured 26 br/min   Vt Exhaled 512 mL   Minute Volume 12.9 Liters   I:E Ratio 1:1.80   Cough/Sputum   Sputum How Obtained Suctioned;Endotracheal;Oral   Cough Productive   Sputum Amount Moderate   Sputum Color Dark Yellow;Green   Tenacity Thick   Spontaneous Breathing Trial (SBT) RT Doc   Pulse 91   SpO2 93 %   Breath Sounds   Right Upper Lobe Rhonchi   Right Middle Lobe Diminished   Right Lower Lobe Diminished   Left Upper Lobe Rhonchi   Left Lower Lobe Diminished   Additional Respiratory  Assessments   Resp 24   Position Semi-Contreras's   Oral Care Completed? Yes   Oral Care Mouth suctioned   Subglottic Suction Done?  Yes   Alarm Settings   High Pressure Alarm 40 cmH2O   Low Minute Volume Alarm 4 L/min   Apnea (secs) 20 secs   High Respiratory Rate 40 br/min   Low Exhaled Vt  300 mL

## 2019-04-20 ENCOUNTER — APPOINTMENT (OUTPATIENT)
Dept: GENERAL RADIOLOGY | Age: 39
DRG: 052 | End: 2019-04-20
Payer: COMMERCIAL

## 2019-04-20 LAB
ANION GAP SERPL CALCULATED.3IONS-SCNC: 8 MMOL/L (ref 3–16)
BANDED NEUTROPHILS RELATIVE PERCENT: 5 % (ref 0–7)
BASE EXCESS ARTERIAL: 0.1 MMOL/L (ref -3–3)
BASOPHILS ABSOLUTE: 0 K/UL (ref 0–0.2)
BASOPHILS RELATIVE PERCENT: 0 %
BUN BLDV-MCNC: 10 MG/DL (ref 7–20)
CALCIUM SERPL-MCNC: 8.2 MG/DL (ref 8.3–10.6)
CARBOXYHEMOGLOBIN ARTERIAL: 0.7 % (ref 0–1.5)
CHLORIDE BLD-SCNC: 104 MMOL/L (ref 99–110)
CO2: 25 MMOL/L (ref 21–32)
CREAT SERPL-MCNC: 0.9 MG/DL (ref 0.9–1.3)
EOSINOPHILS ABSOLUTE: 0.1 K/UL (ref 0–0.6)
EOSINOPHILS RELATIVE PERCENT: 1 %
GFR AFRICAN AMERICAN: >60
GFR NON-AFRICAN AMERICAN: >60
GLUCOSE BLD-MCNC: 111 MG/DL (ref 70–99)
GLUCOSE BLD-MCNC: 116 MG/DL (ref 70–99)
GLUCOSE BLD-MCNC: 133 MG/DL (ref 70–99)
GLUCOSE BLD-MCNC: 140 MG/DL (ref 70–99)
HCO3 ARTERIAL: 25.6 MMOL/L (ref 21–29)
HCT VFR BLD CALC: 31.5 % (ref 40.5–52.5)
HEMOGLOBIN, ART, EXTENDED: 11.1 G/DL (ref 13.5–17.5)
HEMOGLOBIN: 10.6 G/DL (ref 13.5–17.5)
LYMPHOCYTES ABSOLUTE: 1.1 K/UL (ref 1–5.1)
LYMPHOCYTES RELATIVE PERCENT: 17 %
MAGNESIUM: 1.5 MG/DL (ref 1.8–2.4)
MCH RBC QN AUTO: 31.4 PG (ref 26–34)
MCHC RBC AUTO-ENTMCNC: 33.8 G/DL (ref 31–36)
MCV RBC AUTO: 92.8 FL (ref 80–100)
METHEMOGLOBIN ARTERIAL: 0.5 %
MONOCYTES ABSOLUTE: 0.3 K/UL (ref 0–1.3)
MONOCYTES RELATIVE PERCENT: 4 %
NEUTROPHILS ABSOLUTE: 5.2 K/UL (ref 1.7–7.7)
NEUTROPHILS RELATIVE PERCENT: 73 %
O2 CONTENT ARTERIAL: 16 ML/DL
O2 SAT, ARTERIAL: 98.3 %
O2 THERAPY: ABNORMAL
PCO2 ARTERIAL: 45.2 MMHG (ref 35–45)
PDW BLD-RTO: 13.1 % (ref 12.4–15.4)
PERFORMED ON: ABNORMAL
PH ARTERIAL: 7.37 (ref 7.35–7.45)
PHOSPHORUS: 2.7 MG/DL (ref 2.5–4.9)
PLATELET # BLD: 188 K/UL (ref 135–450)
PLATELET SLIDE REVIEW: ADEQUATE
PMV BLD AUTO: 6.5 FL (ref 5–10.5)
PO2 ARTERIAL: 123.8 MMHG (ref 75–108)
POTASSIUM SERPL-SCNC: 4.1 MMOL/L (ref 3.5–5.1)
RBC # BLD: 3.39 M/UL (ref 4.2–5.9)
REPORT: NORMAL
SLIDE REVIEW: ABNORMAL
SODIUM BLD-SCNC: 137 MMOL/L (ref 136–145)
TCO2 ARTERIAL: 27 MMOL/L
TOTAL CK: 588 U/L (ref 39–308)
WBC # BLD: 6.7 K/UL (ref 4–11)

## 2019-04-20 PROCEDURE — 85025 COMPLETE CBC W/AUTO DIFF WBC: CPT

## 2019-04-20 PROCEDURE — 6370000000 HC RX 637 (ALT 250 FOR IP): Performed by: INTERNAL MEDICINE

## 2019-04-20 PROCEDURE — 80202 ASSAY OF VANCOMYCIN: CPT

## 2019-04-20 PROCEDURE — 82550 ASSAY OF CK (CPK): CPT

## 2019-04-20 PROCEDURE — 71045 X-RAY EXAM CHEST 1 VIEW: CPT

## 2019-04-20 PROCEDURE — 83735 ASSAY OF MAGNESIUM: CPT

## 2019-04-20 PROCEDURE — 94750 HC PULMONARY COMPLIANCE STUDY: CPT

## 2019-04-20 PROCEDURE — 6360000002 HC RX W HCPCS: Performed by: INTERNAL MEDICINE

## 2019-04-20 PROCEDURE — 80048 BASIC METABOLIC PNL TOTAL CA: CPT

## 2019-04-20 PROCEDURE — 94640 AIRWAY INHALATION TREATMENT: CPT

## 2019-04-20 PROCEDURE — 94003 VENT MGMT INPAT SUBQ DAY: CPT

## 2019-04-20 PROCEDURE — 6360000002 HC RX W HCPCS: Performed by: PHYSICIAN ASSISTANT

## 2019-04-20 PROCEDURE — 99291 CRITICAL CARE FIRST HOUR: CPT | Performed by: INTERNAL MEDICINE

## 2019-04-20 PROCEDURE — 94762 N-INVAS EAR/PLS OXIMTRY CONT: CPT

## 2019-04-20 PROCEDURE — 2000000000 HC ICU R&B

## 2019-04-20 PROCEDURE — 2700000000 HC OXYGEN THERAPY PER DAY

## 2019-04-20 PROCEDURE — 6370000000 HC RX 637 (ALT 250 FOR IP): Performed by: PHYSICIAN ASSISTANT

## 2019-04-20 PROCEDURE — 82803 BLOOD GASES ANY COMBINATION: CPT

## 2019-04-20 PROCEDURE — 99232 SBSQ HOSP IP/OBS MODERATE 35: CPT | Performed by: INTERNAL MEDICINE

## 2019-04-20 PROCEDURE — 84100 ASSAY OF PHOSPHORUS: CPT

## 2019-04-20 PROCEDURE — 2580000003 HC RX 258: Performed by: PHYSICIAN ASSISTANT

## 2019-04-20 PROCEDURE — 2500000003 HC RX 250 WO HCPCS: Performed by: INTERNAL MEDICINE

## 2019-04-20 PROCEDURE — 2580000003 HC RX 258: Performed by: INTERNAL MEDICINE

## 2019-04-20 RX ORDER — ALBUTEROL SULFATE 90 UG/1
2 AEROSOL, METERED RESPIRATORY (INHALATION) EVERY 4 HOURS
Status: DISCONTINUED | OUTPATIENT
Start: 2019-04-20 | End: 2019-04-24

## 2019-04-20 RX ORDER — ALBUTEROL SULFATE 90 UG/1
2 AEROSOL, METERED RESPIRATORY (INHALATION) EVERY 6 HOURS PRN
Status: DISCONTINUED | OUTPATIENT
Start: 2019-04-20 | End: 2019-04-20

## 2019-04-20 RX ADMIN — CEFEPIME 2 G: 2 INJECTION, POWDER, FOR SOLUTION INTRAVENOUS at 12:28

## 2019-04-20 RX ADMIN — MUPIROCIN: 20 OINTMENT TOPICAL at 08:33

## 2019-04-20 RX ADMIN — VANCOMYCIN HYDROCHLORIDE 1250 MG: 1 INJECTION, POWDER, LYOPHILIZED, FOR SOLUTION INTRAVENOUS at 00:07

## 2019-04-20 RX ADMIN — DIAZEPAM 10 MG: 10 TABLET ORAL at 20:30

## 2019-04-20 RX ADMIN — SENNOSIDES AND DOCUSATE SODIUM 2 TABLET: 8.6; 5 TABLET ORAL at 08:31

## 2019-04-20 RX ADMIN — PROPOFOL 50 MCG/KG/MIN: 10 INJECTION, EMULSION INTRAVENOUS at 20:30

## 2019-04-20 RX ADMIN — PROPOFOL 50 MCG/KG/MIN: 10 INJECTION, EMULSION INTRAVENOUS at 17:10

## 2019-04-20 RX ADMIN — Medication 2 PUFF: at 23:09

## 2019-04-20 RX ADMIN — PROPOFOL 50 MCG/KG/MIN: 10 INJECTION, EMULSION INTRAVENOUS at 02:26

## 2019-04-20 RX ADMIN — FENTANYL CITRATE 200 MCG/HR: 50 INJECTION, SOLUTION INTRAMUSCULAR; INTRAVENOUS at 18:29

## 2019-04-20 RX ADMIN — CEFEPIME 2 G: 2 INJECTION, POWDER, FOR SOLUTION INTRAVENOUS at 20:30

## 2019-04-20 RX ADMIN — ACETAMINOPHEN 650 MG: 325 TABLET ORAL at 00:20

## 2019-04-20 RX ADMIN — SENNOSIDES AND DOCUSATE SODIUM 2 TABLET: 8.6; 5 TABLET ORAL at 20:30

## 2019-04-20 RX ADMIN — CEFEPIME 2 G: 2 INJECTION, POWDER, FOR SOLUTION INTRAVENOUS at 03:55

## 2019-04-20 RX ADMIN — MIDAZOLAM HYDROCHLORIDE 2 MG: 2 INJECTION, SOLUTION INTRAMUSCULAR; INTRAVENOUS at 00:39

## 2019-04-20 RX ADMIN — DEXMEDETOMIDINE HYDROCHLORIDE 0.4 MCG/KG/HR: 100 INJECTION, SOLUTION INTRAVENOUS at 18:46

## 2019-04-20 RX ADMIN — FENTANYL CITRATE 200 MCG/HR: 50 INJECTION, SOLUTION INTRAMUSCULAR; INTRAVENOUS at 01:45

## 2019-04-20 RX ADMIN — FENTANYL CITRATE 200 MCG/HR: 50 INJECTION, SOLUTION INTRAMUSCULAR; INTRAVENOUS at 12:40

## 2019-04-20 RX ADMIN — CHLORHEXIDINE GLUCONATE 0.12% ORAL RINSE 15 ML: 1.2 LIQUID ORAL at 08:30

## 2019-04-20 RX ADMIN — Medication 2 PUFF: at 19:23

## 2019-04-20 RX ADMIN — ENOXAPARIN SODIUM 40 MG: 40 INJECTION SUBCUTANEOUS at 08:31

## 2019-04-20 RX ADMIN — DEXMEDETOMIDINE HYDROCHLORIDE 0.4 MCG/KG/HR: 100 INJECTION, SOLUTION INTRAVENOUS at 05:25

## 2019-04-20 RX ADMIN — Medication 2 PUFF: at 12:04

## 2019-04-20 RX ADMIN — PROPOFOL 50 MCG/KG/MIN: 10 INJECTION, EMULSION INTRAVENOUS at 09:21

## 2019-04-20 RX ADMIN — DIAZEPAM 10 MG: 10 TABLET ORAL at 14:07

## 2019-04-20 RX ADMIN — MIDAZOLAM HYDROCHLORIDE 8 MG/HR: 5 INJECTION, SOLUTION INTRAMUSCULAR; INTRAVENOUS at 16:32

## 2019-04-20 RX ADMIN — MIDAZOLAM HYDROCHLORIDE 13 MG/HR: 5 INJECTION, SOLUTION INTRAMUSCULAR; INTRAVENOUS at 06:28

## 2019-04-20 RX ADMIN — Medication 10 ML: at 20:31

## 2019-04-20 RX ADMIN — MAGNESIUM SULFATE HEPTAHYDRATE 1 G: 1 INJECTION, SOLUTION INTRAVENOUS at 10:17

## 2019-04-20 RX ADMIN — DIAZEPAM 10 MG: 10 TABLET ORAL at 08:30

## 2019-04-20 RX ADMIN — VANCOMYCIN HYDROCHLORIDE 1250 MG: 1 INJECTION, POWDER, LYOPHILIZED, FOR SOLUTION INTRAVENOUS at 14:07

## 2019-04-20 RX ADMIN — Medication 2 PUFF: at 15:25

## 2019-04-20 RX ADMIN — MAGNESIUM SULFATE HEPTAHYDRATE 1 G: 1 INJECTION, SOLUTION INTRAVENOUS at 12:03

## 2019-04-20 RX ADMIN — MUPIROCIN: 20 OINTMENT TOPICAL at 20:31

## 2019-04-20 RX ADMIN — PROPOFOL 50 MCG/KG/MIN: 10 INJECTION, EMULSION INTRAVENOUS at 05:16

## 2019-04-20 RX ADMIN — FENTANYL CITRATE 200 MCG/HR: 50 INJECTION, SOLUTION INTRAMUSCULAR; INTRAVENOUS at 06:28

## 2019-04-20 RX ADMIN — CHLORHEXIDINE GLUCONATE 0.12% ORAL RINSE 15 ML: 1.2 LIQUID ORAL at 20:30

## 2019-04-20 RX ADMIN — ACETAMINOPHEN 650 MG: 325 TABLET ORAL at 20:47

## 2019-04-20 RX ADMIN — Medication 10 ML: at 08:31

## 2019-04-20 ASSESSMENT — PULMONARY FUNCTION TESTS
PIF_VALUE: 15
PIF_VALUE: 16
PIF_VALUE: 17
PIF_VALUE: 16
PIF_VALUE: 14
PIF_VALUE: 12
PIF_VALUE: 28
PIF_VALUE: 25
PIF_VALUE: 12
PIF_VALUE: 16
PIF_VALUE: 15
PIF_VALUE: 14
PIF_VALUE: 12
PIF_VALUE: 9.9
PIF_VALUE: 13
PIF_VALUE: 13
PIF_VALUE: 27
PIF_VALUE: 29
PIF_VALUE: 13

## 2019-04-20 ASSESSMENT — PAIN SCALES - GENERAL
PAINLEVEL_OUTOF10: 0
PAINLEVEL_OUTOF10: 10

## 2019-04-20 NOTE — PROGRESS NOTES
Weaned fio2 from 40% to 30%. 04/20/19 0750   Vent Information   Vent Type 840   Vent Mode AC/VC   Vt Ordered 450 mL   Rate Set 14 bmp   Peak Flow 60 L/min   Pressure Support 0 cmH20   FiO2  40 %   Sensitivity 3   PEEP/CPAP 5   I Time/ I Time % 0 s   Vent Patient Data   High Peep/I Pressure 0   Peak Inspiratory Pressure 16 cmH2O   Mean Airway Pressure 6.2 cmH20   Rate Measured 17 br/min   Vt Exhaled 456 mL   Minute Volume 8.47 Liters   I:E Ratio 1:2.60   Plateau Pressure 15 [id number]   Static Compliance 38 mL/cmH2O   Dynamic Compliance 41 mL/cmH2O   Cough/Sputum   Sputum How Obtained Suctioned;Endotracheal;Oral   Sputum Amount Small   Sputum Color Green;Dark Yellow   Tenacity Thick   Spontaneous Breathing Trial (SBT) RT Doc   Pulse 94   SpO2 99 %   Breath Sounds   Right Upper Lobe Rhonchi   Right Middle Lobe Rhonchi   Right Lower Lobe Diminished   Left Upper Lobe Rhonchi   Left Lower Lobe Diminished   Additional Respiratory  Assessments   Resp 18   Position Semi-Contreras's   Oral Care Completed? Yes   Oral Care Mouth suctioned   Subglottic Suction Done? Yes   Alarm Settings   High Pressure Alarm 40 cmH2O   Low Minute Volume Alarm 4 L/min   Apnea (secs) 20 secs   High Respiratory Rate 40 br/min   Low Exhaled Vt  300 mL   Patient Observation   Observations ETT SIZE 7.5, SECURED AT 25 LIP LINE. AMBU BAG AT HEAD OF BED. WATER BAG GOOD. ETT (adult)   Placement Date/Time: 04/16/19 1542   Timeout: Patient;Procedure;Site/Side;Appropriate Equipment  Preoxygenation: Yes  Mask Ventilation: Ventilated by mask (1)  Technique: Rapid sequence  Type: Cuffed  Tube Size: 8 mm  Laryngoscope: GlideScope  Locatio. ..    Secured at 25 cm   Measured From Lips   ET Placement Right   Secured By Commercial tube pritchett   Site Condition Dry

## 2019-04-20 NOTE — PROGRESS NOTES
04/20/19 0335   Vent Information   Vent Type 840   Vent Mode AC/VC   Vt Ordered 450 mL   Rate Set 14 bmp   Peak Flow 60 L/min   Pressure Support 0 cmH20   FiO2  40 %   Sensitivity 3   PEEP/CPAP 5   I Time/ I Time % 0 s   Humidification Source Heated wire   Humidification Temp 37   Humidification Temp Measured 37   Circuit Condensation Drained   Vent Patient Data   High Peep/I Pressure 0   Peak Inspiratory Pressure 25 cmH2O   Mean Airway Pressure 6.5 cmH20   Rate Measured 17 br/min   Vt Exhaled 486 mL   Minute Volume 8.5 Liters   I:E Ratio 1:3.00   Cough/Sputum   Sputum How Obtained Endotracheal;Suctioned   Cough Productive   Sputum Amount Small   Sputum Color Green;Dark Yellow   Tenacity Thick   Spontaneous Breathing Trial (SBT) RT Doc   Pulse 97   SpO2 97 %   Breath Sounds   Right Upper Lobe Rhonchi   Right Middle Lobe Rhonchi   Right Lower Lobe Diminished   Left Upper Lobe Rhonchi   Left Lower Lobe Diminished   Additional Respiratory  Assessments   Resp 18   Position Semi-Contreras's   Alarm Settings   High Pressure Alarm 40 cmH2O   Low Minute Volume Alarm 4 L/min   Apnea (secs) 20 secs   High Respiratory Rate 40 br/min   Patient Observation   Observations ETT SIZE 7.5, SECURED AT 25 LIP LINE. AMBU BAG AT HEAD OF BED. WATER BAG GOOD. ETT (adult)   Placement Date/Time: 04/16/19 1542   Timeout: Patient;Procedure;Site/Side;Appropriate Equipment  Preoxygenation: Yes  Mask Ventilation: Ventilated by mask (1)  Technique: Rapid sequence  Type: Cuffed  Tube Size: 8 mm  Laryngoscope: GlideScope  Locatio. ..    Secured at 25 cm   Measured From Lips   ET Placement Left   Secured By Commercial tube pritchett   Site Condition Dry

## 2019-04-20 NOTE — PROGRESS NOTES
04/20/19 1923   Vent Information   $Ventilation $Subsequent Day   Vent Type 840   Vent Mode AC/VC   Vt Ordered 450 mL   Rate Set 14 bmp   Peak Flow 60 L/min   Pressure Support 0 cmH20   FiO2  30 %   Sensitivity 3   PEEP/CPAP 5   I Time/ I Time % 0 s   Cuff Pressure (cm H2O) 35 cm H2O   Humidification Source Heated wire   Humidification Temp 37   Humidification Temp Measured 37   Circuit Condensation Drained   Vent Patient Data   High Peep/I Pressure 0   Peak Inspiratory Pressure 14 cmH2O   Mean Airway Pressure 5.6 cmH20   Rate Measured 17 br/min   Vt Exhaled 423 mL   Minute Volume 8.55 Liters   I:E Ratio 1:3.40   Plateau Pressure 17 [id number]   Static Compliance 35 mL/cmH2O   Dynamic Compliance 47 mL/cmH2O   Cough/Sputum   Sputum How Obtained Endotracheal;Suctioned   Cough Productive   Sputum Amount Moderate   Sputum Color Dark Yellow   Tenacity Thick   Spontaneous Breathing Trial (SBT) RT Doc   Pulse 92   SpO2 99 %   Breath Sounds   Right Upper Lobe Rhonchi   Right Middle Lobe Rhonchi   Right Lower Lobe Diminished   Left Upper Lobe Rhonchi   Left Lower Lobe Diminished   Additional Respiratory  Assessments   Resp 17   Position Semi-Contreras's   Alarm Settings   High Pressure Alarm 40 cmH2O   Low Minute Volume Alarm 4 L/min   Apnea (secs) 20 secs   High Respiratory Rate 40 br/min   Patient Observation   Observations ETT SIZE 7.5, SECURED AT 25 LIP LINE. AMBU BAG AT HEAD OF BED. WATER BAG GOOD. ETT (adult)   Placement Date/Time: 04/16/19 1542   Timeout: Patient;Procedure;Site/Side;Appropriate Equipment  Preoxygenation: Yes  Mask Ventilation: Ventilated by mask (1)  Technique: Rapid sequence  Type: Cuffed  Tube Size: 8 mm  Laryngoscope: GlideScope  Locatio. ..    Secured at 25 cm   Measured From Lips   ET Placement Right   Secured By Commercial tube pritchett   Site Condition Dry

## 2019-04-20 NOTE — PROGRESS NOTES
RESPIRATORY THERAPY ASSESSMENT    Name:  [facility] Record Number:  [id number]  Age: 45 y.o. Gender: male  : 1980  Today's Date:  2019  Room:  [facility]301-    Assessment     Is the patient being admitted for a COPD or Asthma exacerbation? No   (If yes the patient will be seen every 4 hours for the first 24 hours and then reassessed)    Patient Admission Diagnosis      Allergies  No Known Allergies    Minimum Predicted Vital Capacity:     On vent          Actual Vital Capacity:      vent              Pulmonary History:No history  Home Oxygen Therapy:  room air  Home Respiratory Therapy:None   Current Respiratory Therapy:  Albuterol q4/atrovent q4  Treatment Type: MDI  Medications: Albuterol    Respiratory Severity Index(RSI)   Patients with orders for inhalation medications, oxygen, or any therapeutic treatment modality will be placed on Respiratory Protocol. They will be assessed with the first treatment and at least every 72 hours thereafter. The following severity scale will be used to determine frequency of treatment intervention.     Smoking History: Smoking History Less than 1ppd or less than 15 pack year = 1    Social History  Social History     Tobacco Use    Smoking status: Current Some Day Smoker     Packs/day: 0.50     Years: 3.00     Pack years: 1.50     Types: Cigarettes    Smokeless tobacco: Never Used   Substance Use Topics    Alcohol use: No    Drug use: Yes     Frequency: 3.0 times per week     Types: Marijuana, Opiates , Methamphetamines     Comment: Heroin, Fentanyl       Recent Surgical History: None = 0  Past Surgical History  Past Surgical History:   Procedure Laterality Date    BACK SURGERY      KNEE SURGERY      right knee       Level of Consciousness: Comatose = 4    Level of Activity: Bedridden, unresponsive or quadriplegic = 4    Respiratory Pattern: Increased; RR 21-30 = 1    Breath Sounds: Diminshed bilaterally and/or crackles = met  a. Incognizant or uncooperative          b. Patients treated with HHN at Home        c. Unable to demonstrate proper use of MDI with spacer     d. RR > 30 bpm   5. Bronchodilators will be delivered via Metered Dose Inhaler (MDI), HHN, Aerogen to intubated patients on mechanical ventilation. 6. Inhalation medication orders will be delivered and/or substituted as outlined below. Aerosolized Medications Ordering and Administration Guidelines:    1. All Medications will be ordered by a physician, and their frequency and/or modality will be adjusted as defined by the patients Respiratory Severity Index (RSI) score. 2. If the patient does not have documented COPD, consider discontinuing anticholinergics when RSI is less than 9.  3. If the bronchospasm worsens (increased RSI), then the bronchodilator frequency can be increased to a maximum of every 4 hours. If greater than every 4 hours is required, the physician will be contacted. 4. If the bronchospasm improves, the frequency of the bronchodilator can be decreased, based on the patient's RSI, but not less than home treatment regimen frequency. 5. Bronchodilator(s) will be discontinued if patient has a RSI less than 9 and has received no scheduled or as needed treatment for 72  Hrs. Patients Ordered on a Mucolytic Agent:    1. Must always be administered with a bronchodilator. 2. Discontinue if patient experiences worsened bronchospasm, or secretions have lessened to the point that the patient is able to clear them with a cough. Anti-inflammatory and Combination Medications:    1. If the patient lacks prior history of lung disease, is not using inhaled anti-inflammatory medication at home, and lacks wheezing by examination or by history for at least 24 hours, contact physician for possible discontinuation.

## 2019-04-20 NOTE — PROGRESS NOTES
[facility] Patient improved today and versed titrated down. Mother at bedside. Will continue to monitor and assess.

## 2019-04-20 NOTE — PROGRESS NOTES
Afternoon assessment completed. See flowsheet. Sedated on vent. Propofol infusing at 50 mcg/kg/min, Fentanyl infusing at 200 mcg/hr, Versed infusing at 10 mg/hr, Precedex infusing at 0.5 mcg/kg/hr. RASS -1. Lungs sounds rhonchi in upper lobes, diminished throughout. ST on bedside monitor. Bowel sounds active. Trace BUE and BLE edema noted. Urinary catheter in place draining clear yellow urine. Bilateral soft wrist restraints in place for safety. Labs reviewed. Cont to monitor.

## 2019-04-20 NOTE — PROGRESS NOTES
Evening assessment completed. See flowsheet. Sedated on vent. Propofol infusing at 50 mcg/kg/min, Fentanyl infusing at 200 mcg/hr, Versed infusing at 8 mg/hr, Precedex infusing at 0.4 mcg/kg/hr. RASS -1. Lungs sounds rhonchi in upper lobes, diminished throughout. NSR on bedside monitor. Bowel sounds active. Trace BUE and BLE pitting edema noted. Starleen Kid in place draining clear yellow urine. Bilateral soft wrist restraints in place for safety. Labs reviewed.  Cont to monitor.

## 2019-04-20 NOTE — PROGRESS NOTES
04/19/19 2327   Vent Information   Vent Type 840   Vent Mode AC/VC   Vt Ordered 450 mL   Rate Set 14 bmp   Peak Flow 60 L/min   Pressure Support 0 cmH20   FiO2  40 %   Sensitivity 3   PEEP/CPAP 5   I Time/ I Time % 0 s   Humidification Source Heated wire   Vent Patient Data   High Peep/I Pressure 0   Peak Inspiratory Pressure 20 cmH2O   Mean Airway Pressure 7.8 cmH20   Rate Measured 17 br/min   Vt Exhaled 522 mL   Minute Volume 8.54 Liters   I:E Ratio 1:3.40   Cough/Sputum   Sputum How Obtained Endotracheal;Suctioned   Cough Productive   Sputum Amount Moderate   Sputum Color Green;Dark Yellow   Tenacity Thick   Spontaneous Breathing Trial (SBT) RT Doc   Pulse 92   SpO2 96 %   Breath Sounds   Right Upper Lobe Rhonchi   Right Middle Lobe Rhonchi   Right Lower Lobe Diminished   Left Upper Lobe Rhonchi   Left Lower Lobe Diminished   Additional Respiratory  Assessments   Resp 18   Position Semi-Contreras's   Alarm Settings   High Pressure Alarm 40 cmH2O   Low Minute Volume Alarm 4 L/min   Apnea (secs) 20 secs   High Respiratory Rate 40 br/min   Patient Observation   Observations ETT SIZE 7.5, SECURED AT 25 LIP LINE. AMBU BAG AT HEAD OF BED. WATER BAG GOOD. ETT (adult)   Placement Date/Time: 04/16/19 1542   Timeout: Patient;Procedure;Site/Side;Appropriate Equipment  Preoxygenation: Yes  Mask Ventilation: Ventilated by mask (1)  Technique: Rapid sequence  Type: Cuffed  Tube Size: 8 mm  Laryngoscope: GlideScope  Locatio. ..    Secured at 25 cm   Measured From Lips   ET Placement Left   Secured By Commercial tube pritchett   Site Condition Dry

## 2019-04-20 NOTE — PROGRESS NOTES
Vancomycin Day # 2  Current dose = 1250 mg IV q12h  BUN/SRCR 10/0.9      Est CrCl = 119 ml/min  WBC   6.7            Tmax 102.1  Vancomycin trough due tonight @ 2330. Continue same until then.

## 2019-04-20 NOTE — PROGRESS NOTES
04/19/19 1959   Vent Information   $Ventilation $Subsequent Day   Vent Type 840   Vent Mode AC/VC   Vt Ordered 450 mL   Rate Set 14 bmp   Peak Flow 60 L/min   Pressure Support 0 cmH20   FiO2  40 %   Sensitivity 3   PEEP/CPAP 5   I Time/ I Time % 0 s   Cuff Pressure (cm H2O) 35 cm H2O   Humidification Source Heated wire   Humidification Temp 36.9   Humidification Temp Measured 36.9   Circuit Condensation Drained   Vent Patient Data   High Peep/I Pressure 0   Peak Inspiratory Pressure 17 cmH2O   Mean Airway Pressure 7.2 cmH20   Rate Measured 17 br/min   Vt Exhaled 521 mL   Minute Volume 8.25 Liters   I:E Ratio 1:3.70   Plateau Pressure 18 GDV76   Static Compliance 40 mL/cmH2O   Dynamic Compliance 43 mL/cmH2O   Cough/Sputum   Sputum How Obtained Endotracheal;Suctioned   Cough Productive   Sputum Amount Moderate   Sputum Color Green;Dark Yellow   Tenacity Thick   Spontaneous Breathing Trial (SBT) RT Doc   Pulse 99   SpO2 97 %   Breath Sounds   Right Upper Lobe Rhonchi   Right Middle Lobe Rhonchi   Right Lower Lobe Diminished   Left Upper Lobe Rhonchi   Left Lower Lobe Diminished   Additional Respiratory  Assessments   Resp 17   Position Semi-Contreras's   Alarm Settings   High Pressure Alarm 40 cmH2O   Low Minute Volume Alarm 4 L/min   Apnea (secs) 20 secs   High Respiratory Rate 40 br/min   Patient Observation   Observations ETT SIZE 7.5, SECURED AT 25 LIP LINE. AMBU BAG AT HEAD OF BED. WATER BAG GOOD. ETT (adult)   Placement Date/Time: 04/16/19 1542   Timeout: Patient;Procedure;Site/Side;Appropriate Equipment  Preoxygenation: Yes  Mask Ventilation: Ventilated by mask (1)  Technique: Rapid sequence  Type: Cuffed  Tube Size: 8 mm  Laryngoscope: GlideScope  Locatio. ..    Secured at 25 cm   Measured From Lips   ET Placement Left   Secured By Commercial tube pritchett   Site Condition Dry

## 2019-04-20 NOTE — PROGRESS NOTES
Progress Note    Admit Date:  4/15/2019    Admitted with agitation, acute resp failure     Per pt's mother , he has never acted this way in the past. His father is bipolar and his mother reports that the patient has gone through rehab before but has never seen a psychiatrist before, but is very concerned with his behaviors stating that he had been sober for 8 months and doing well. Subjective:  Mr. Alma Padron was very agitated, required heavy sedation, intubated for airway protection    on Mech vent    on propofol, versed and fentanyl gtt   sats stable     4/19: still very agitated. spiked temps to 103 F   plan bronch today. blood cultures sent     IVabx started     4/20  Fevers of upto 102     Objective:   BP 95/60   Pulse 85   Temp 100.3 °F (37.9 °C) (Oral)   Resp 14   Ht 5' 11\" (1.803 m)   Wt 197 lb 6.4 oz (89.5 kg)   SpO2 96%   BMI 27.53 kg/m²       Intake/Output Summary (Last 24 hours) at 4/20/2019 1311  Last data filed at 4/20/2019 0925  Gross per 24 hour   Intake 8485 ml   Output 3375 ml   Net 5110 ml         Physical Exam:  General:  Intubated, sedated   Skin:  Warm and dry  Neck:  JVD absent. Neck supple  Chest:  Clear to auscultation, respiration easy. No wheezes, rales or rhonchi. Cardiovascular:  RRR ,S1S2 normal  Abdomen:  Soft, non tender, non distended, BS +  Extremities:  No edema. Intact peripheral pulses.  Brisk cap refill, < 2 secs  Neuro: non focal      Medications:   Scheduled Meds:   ipratropium  2 puff Inhalation Q4H    vancomycin  1,250 mg Intravenous Q12H    cefepime  2 g Intravenous Q8H    chlorhexidine  15 mL Mouth/Throat BID    fentanNYL  50 mcg Intravenous Once    midazolam  2 mg Intravenous Once    diazepam  10 mg Oral TID    sennosides-docusate sodium  2 tablet Oral BID    folic acid, thiamine, multi-vitamin with vitamin K infusion   Intravenous Once    sodium chloride flush  10 mL Intravenous 2 times per day    enoxaparin  40 mg Subcutaneous Daily    mupirocin   Nasal BID       Continuous Infusions:   dexmedetomidine (PRECEDEX) IV infusion 0.4 mcg/kg/hr (04/20/19 0525)    dextrose      fentaNYL (SUBLIMAZE) infusion 200 mcg/hr (04/20/19 1240)    sodium chloride 100 mL/hr at 04/19/19 2116    propofol 50 mcg/kg/min (04/20/19 0921)    midazolam 10 mg/hr (04/20/19 1030)       Data:  CBC:   Recent Labs     04/18/19  0510 04/19/19  0530 04/20/19  0621   WBC 6.2 7.3 6.7   RBC 3.72* 3.58* 3.39*   HGB 11.6* 11.4* 10.6*   HCT 34.0* 33.2* 31.5*   MCV 91.3 92.9 92.8   RDW 13.1 13.6 13.1    232 188     BMP:   Recent Labs     04/18/19  0510 04/19/19  0530 04/20/19  0540    136 137   K 3.7 3.7 4.1    100 104   CO2 28 28 25   PHOS 3.9 2.5 2.7   BUN 12 8 10   CREATININE 0.7* 0.9 0.9     BNP: No results for input(s): BNP in the last 72 hours. PT/INR: No results for input(s): PROTIME, INR in the last 72 hours. APTT: No results for input(s): APTT in the last 72 hours. CARDIAC ENZYMES: No results for input(s): CKMB, CKMBINDEX, TROPONINI in the last 72 hours. Invalid input(s): CKTOTAL;3  FASTING LIPID PANEL:  Lab Results   Component Value Date    TRIG 150 04/19/2019     LIVER PROFILE:   No results for input(s): AST, ALT, ALB, BILIDIR, BILITOT, ALKPHOS in the last 72 hours. XR CHEST PORTABLE   Final Result   Subtle left streaky basilar opacity which may represent atelectasis or   developing infection. XR CHEST PORTABLE   Final Result   No evidence for acute cardiopulmonary process. XR ABDOMEN (KUB) (SINGLE AP VIEW)   Final Result   No foreign body is identified. Normal bowel gas pattern. XR CHEST PORTABLE   Final Result   Low volume study with vascular crowding versus mild edema, similar to prior. XR CHEST PORTABLE   Final Result   New life support apparatus as above. Mild edema. No pneumothorax. XR CHEST STANDARD (2 VW)   Final Result   No acute cardiopulmonary process.          CT Head WO Contrast

## 2019-04-20 NOTE — PROGRESS NOTES
AM assessment completed. See flowsheet. Sedated on vent. Propofol infusing at 50 mcg/kg/min, Fentanyl infusing at 200 mcg/hr, Versed infusing at 13 mg/hr, Precedex infusing at 0.5 mcg/kg/hr. RASS -1. Lungs sounds rhonchi in upper lobes, diminished throughout. ST on bedside monitor. Bowel sounds active. Trace BUE and BLE edema noted. Urinary catheter in place draining clear yellow urine. Bilateral soft wrist restraints in place for safety. Labs reviewed. Cont to monitor.

## 2019-04-20 NOTE — PROGRESS NOTES
Pulmonary & Critical Care Medicine ICU Progress Note  CC:Acute respiratory failure with hypoxemia    Events of Last 24 hours: patient again febrile to 103, Bronchoscopy with BAL showed thick secretions    Invasive Lines:   IV Line: IJ 4/16    MV:  4/16    Vent Mode: AC/VC Rate Set: 14 bmp/Vt Ordered: 450 mL/ /FiO2 : 40 %  Recent Labs     04/19/19  0539 04/20/19  0530   PHART 7.477* 7.371   LOT9SKF 38.6 45.2*   PO2ART 83.5 123.8*       IV:   dexmedetomidine (PRECEDEX) IV infusion 0.4 mcg/kg/hr (04/20/19 0525)    dextrose      fentaNYL (SUBLIMAZE) infusion 200 mcg/hr (04/20/19 3028)    sodium chloride 100 mL/hr at 04/19/19 2116    propofol 50 mcg/kg/min (04/20/19 0516)    midazolam 13 mg/hr (04/20/19 0628)       EXAM:  BP 98/65   Pulse 99   Temp 99 °F (37.2 °C) (Oral)   Resp 18   Ht 5' 11\" (1.803 m)   Wt 197 lb 6.4 oz (89.5 kg)   SpO2 97%   BMI 27.53 kg/m²  on vent  Tmax:103.1  CVP:      Intake/Output Summary (Last 24 hours) at 4/20/2019 0639  Last data filed at 4/20/2019 0448  Gross per 24 hour   Intake 8018 ml   Output 4150 ml   Net 3868 ml     General: No distress. NCAT. Eyes: PERRL. No sclera icterus. No conjunctival injection. ENT: No discharge. Pharynx clear. Neck: Trachea midline. Normal thyroid. Resp: No accessory muscle use. +  crackles. No wheezing. No rhonchi. No dullness on percussion. CV: tachy rate. Regular rhythm. No mumur or rub. No edema. Peripheral pulses are 2+. Capillary refill is less than 3 seconds. GI: Non-tender. Non-distended. No masses. No organmegaly. Normal bowel sounds. No hernia. Skin: Warm and dry. No nodule on exposed extremities. No rash on exposed extremities. Lymph: No cervical LAD. No supraclavicular LAD. M/S: No cyanosis. No joint deformity. No clubbing.    Neuro: sedated, not moving extremities, + pupillary response    Medications:   vancomycin  1,250 mg Intravenous Q12H    cefepime  2 g Intravenous Q8H    chlorhexidine  15 mL Mouth/Throat BID    fentanNYL  50 mcg Intravenous Once    midazolam  2 mg Intravenous Once    diazepam  10 mg Oral TID    sennosides-docusate sodium  2 tablet Oral BID    folic acid, thiamine, multi-vitamin with vitamin K infusion   Intravenous Once    sodium chloride flush  10 mL Intravenous 2 times per day    enoxaparin  40 mg Subcutaneous Daily    mupirocin   Nasal BID     PRN Meds:  fentanNYL, midazolam, glucose, dextrose, glucagon (rDNA), dextrose, medicated lip balm, sodium chloride flush, magnesium hydroxide, ondansetron, potassium chloride **OR** potassium alternative oral replacement **OR** potassium chloride, magnesium sulfate, acetaminophen, [] traMADol **AND** cloNIDine    Results:  CBC:   Recent Labs     19  0510 19  0530 19  0621   WBC 6.2 7.3 6.7   HGB 11.6* 11.4* 10.6*   HCT 34.0* 33.2* 31.5*   MCV 91.3 92.9 92.8    232 188     BMP:   Recent Labs     19  0510 19  0530 19  0540    136 137   K 3.7 3.7 4.1    100 104   CO2 28 28 25   PHOS 3.9 2.5 2.7   BUN 12 8 10   CREATININE 0.7* 0.9 0.9     LIVER PROFILE: No results for input(s): AST, ALT, LIPASE, BILIDIR, BILITOT, ALKPHOS in the last 72 hours. Invalid input(s): AMYLASE,  ALB  PT/INR: No results for input(s): PROTIME, INR in the last 72 hours. APTT: No results for input(s): APTT in the last 72 hours. UA:No results for input(s): NITRITE, COLORU, PHUR, LABCAST, WBCUA, RBCUA, MUCUS, TRICHOMONAS, YEAST, BACTERIA, CLARITYU, SPECGRAV, LEUKOCYTESUR, UROBILINOGEN, BILIRUBINUR, BLOODU, GLUCOSEU, AMORPHOUS in the last 72 hours. Invalid input(s): Madi Imus    Cultures:  Bld: pending  BAL - pending    Films:  CXR : Streaky LLL airspace opacities, ETT in place    Assessment:  · Acute respiratory failure with hypoxemia  · Acute encephalopathy  · Agitation  · polysubstance abuse  · electrolyte disturbance  · New fever- possible LLL HCAP      Plan:  · Mechanical ventilation per my orders.  The ventilator was adjusted by me at the bedside for unstable, life threatening respiratory failure. Wean oxygen as tolerated. · Iv sedation -precedex, fentanyl 200, versed 13/hr; propofol 50; try to reduce versed as able  · Daily sat/sbt  · Valium 10 mg tid  · seroquel 50 bid- hold for now ? Serotonin syndrome with [place] wort. · Tube feeds  · Replaced Magnesium  · F/u cultures  · Abx: D2 vanc and cefepime  · F/u BAL from 4/19  · ICU care- lovenox and bactroban, pepcid  · D/w mother at bedside    Critical care time spent reviewing labs/films, examining patient, collaborating with other physicians but excluding procedures for life threatening organ failure is 32 minutes.

## 2019-04-21 ENCOUNTER — APPOINTMENT (OUTPATIENT)
Dept: GENERAL RADIOLOGY | Age: 39
DRG: 052 | End: 2019-04-21
Payer: COMMERCIAL

## 2019-04-21 LAB
ANION GAP SERPL CALCULATED.3IONS-SCNC: 7 MMOL/L (ref 3–16)
BASE EXCESS ARTERIAL: 2.2 MMOL/L (ref -3–3)
BASOPHILS ABSOLUTE: 0 K/UL (ref 0–0.2)
BASOPHILS RELATIVE PERCENT: 0.5 %
BUN BLDV-MCNC: 11 MG/DL (ref 7–20)
CALCIUM SERPL-MCNC: 8.1 MG/DL (ref 8.3–10.6)
CARBOXYHEMOGLOBIN ARTERIAL: 0.2 % (ref 0–1.5)
CHLORIDE BLD-SCNC: 101 MMOL/L (ref 99–110)
CO2: 27 MMOL/L (ref 21–32)
CREAT SERPL-MCNC: 0.9 MG/DL (ref 0.9–1.3)
CULTURE, RESPIRATORY: ABNORMAL
EOSINOPHILS ABSOLUTE: 0.3 K/UL (ref 0–0.6)
EOSINOPHILS RELATIVE PERCENT: 3.9 %
GFR AFRICAN AMERICAN: >60
GFR NON-AFRICAN AMERICAN: >60
GLUCOSE BLD-MCNC: 107 MG/DL (ref 70–99)
GLUCOSE BLD-MCNC: 110 MG/DL (ref 70–99)
GLUCOSE BLD-MCNC: 120 MG/DL (ref 70–99)
GLUCOSE BLD-MCNC: 129 MG/DL (ref 70–99)
GRAM STAIN RESULT: ABNORMAL
HCO3 ARTERIAL: 26 MMOL/L (ref 21–29)
HCT VFR BLD CALC: 28 % (ref 40.5–52.5)
HEMOGLOBIN, ART, EXTENDED: 9.7 G/DL (ref 13.5–17.5)
HEMOGLOBIN: 9.5 G/DL (ref 13.5–17.5)
LYMPHOCYTES ABSOLUTE: 0.9 K/UL (ref 1–5.1)
LYMPHOCYTES RELATIVE PERCENT: 10.9 %
MAGNESIUM: 1.6 MG/DL (ref 1.8–2.4)
MCH RBC QN AUTO: 32.1 PG (ref 26–34)
MCHC RBC AUTO-ENTMCNC: 34.1 G/DL (ref 31–36)
MCV RBC AUTO: 94.3 FL (ref 80–100)
METHEMOGLOBIN ARTERIAL: 0.5 %
MONOCYTES ABSOLUTE: 0.5 K/UL (ref 0–1.3)
MONOCYTES RELATIVE PERCENT: 6.5 %
NEUTROPHILS ABSOLUTE: 6.6 K/UL (ref 1.7–7.7)
NEUTROPHILS RELATIVE PERCENT: 78.2 %
O2 CONTENT ARTERIAL: 13 ML/DL
O2 SAT, ARTERIAL: 97 %
O2 THERAPY: ABNORMAL
ORGANISM: ABNORMAL
PCO2 ARTERIAL: 37.4 MMHG (ref 35–45)
PDW BLD-RTO: 13.7 % (ref 12.4–15.4)
PERFORMED ON: ABNORMAL
PH ARTERIAL: 7.46 (ref 7.35–7.45)
PHOSPHORUS: 2.1 MG/DL (ref 2.5–4.9)
PLATELET # BLD: 190 K/UL (ref 135–450)
PMV BLD AUTO: 6.7 FL (ref 5–10.5)
PO2 ARTERIAL: 85.9 MMHG (ref 75–108)
POTASSIUM SERPL-SCNC: 3.8 MMOL/L (ref 3.5–5.1)
RBC # BLD: 2.97 M/UL (ref 4.2–5.9)
SODIUM BLD-SCNC: 135 MMOL/L (ref 136–145)
TCO2 ARTERIAL: 27.2 MMOL/L
TOTAL CK: 232 U/L (ref 39–308)
VANCOMYCIN TROUGH: 7.2 UG/ML (ref 10–20)
WBC # BLD: 8.4 K/UL (ref 4–11)

## 2019-04-21 PROCEDURE — 83735 ASSAY OF MAGNESIUM: CPT

## 2019-04-21 PROCEDURE — 2580000003 HC RX 258: Performed by: INTERNAL MEDICINE

## 2019-04-21 PROCEDURE — 94750 HC PULMONARY COMPLIANCE STUDY: CPT

## 2019-04-21 PROCEDURE — 6360000002 HC RX W HCPCS: Performed by: INTERNAL MEDICINE

## 2019-04-21 PROCEDURE — 94762 N-INVAS EAR/PLS OXIMTRY CONT: CPT

## 2019-04-21 PROCEDURE — 2580000003 HC RX 258: Performed by: PHYSICIAN ASSISTANT

## 2019-04-21 PROCEDURE — 99291 CRITICAL CARE FIRST HOUR: CPT | Performed by: INTERNAL MEDICINE

## 2019-04-21 PROCEDURE — 6370000000 HC RX 637 (ALT 250 FOR IP): Performed by: INTERNAL MEDICINE

## 2019-04-21 PROCEDURE — 2000000000 HC ICU R&B

## 2019-04-21 PROCEDURE — 94003 VENT MGMT INPAT SUBQ DAY: CPT

## 2019-04-21 PROCEDURE — 84100 ASSAY OF PHOSPHORUS: CPT

## 2019-04-21 PROCEDURE — 80048 BASIC METABOLIC PNL TOTAL CA: CPT

## 2019-04-21 PROCEDURE — 2700000000 HC OXYGEN THERAPY PER DAY

## 2019-04-21 PROCEDURE — 94640 AIRWAY INHALATION TREATMENT: CPT

## 2019-04-21 PROCEDURE — 6360000002 HC RX W HCPCS: Performed by: PHYSICIAN ASSISTANT

## 2019-04-21 PROCEDURE — 82803 BLOOD GASES ANY COMBINATION: CPT

## 2019-04-21 PROCEDURE — 71045 X-RAY EXAM CHEST 1 VIEW: CPT

## 2019-04-21 PROCEDURE — 85025 COMPLETE CBC W/AUTO DIFF WBC: CPT

## 2019-04-21 PROCEDURE — 82550 ASSAY OF CK (CPK): CPT

## 2019-04-21 PROCEDURE — 99232 SBSQ HOSP IP/OBS MODERATE 35: CPT | Performed by: INTERNAL MEDICINE

## 2019-04-21 PROCEDURE — 2500000003 HC RX 250 WO HCPCS: Performed by: INTERNAL MEDICINE

## 2019-04-21 RX ORDER — CARBOXYMETHYLCELLULOSE SODIUM 10 MG/ML
GEL OPHTHALMIC PRN
Status: DISCONTINUED | OUTPATIENT
Start: 2019-04-21 | End: 2019-04-27 | Stop reason: HOSPADM

## 2019-04-21 RX ADMIN — DEXMEDETOMIDINE HYDROCHLORIDE 0.4 MCG/KG/HR: 100 INJECTION, SOLUTION INTRAVENOUS at 11:39

## 2019-04-21 RX ADMIN — VANCOMYCIN HYDROCHLORIDE 1250 MG: 1 INJECTION, POWDER, LYOPHILIZED, FOR SOLUTION INTRAVENOUS at 00:22

## 2019-04-21 RX ADMIN — Medication 2 PUFF: at 15:50

## 2019-04-21 RX ADMIN — FENTANYL CITRATE 100 MCG/HR: 50 INJECTION, SOLUTION INTRAMUSCULAR; INTRAVENOUS at 11:42

## 2019-04-21 RX ADMIN — MUPIROCIN: 20 OINTMENT TOPICAL at 09:18

## 2019-04-21 RX ADMIN — Medication 2 PUFF: at 15:49

## 2019-04-21 RX ADMIN — VANCOMYCIN HYDROCHLORIDE 1250 MG: 10 INJECTION, POWDER, LYOPHILIZED, FOR SOLUTION INTRAVENOUS at 08:22

## 2019-04-21 RX ADMIN — CEFEPIME 2 G: 2 INJECTION, POWDER, FOR SOLUTION INTRAVENOUS at 04:29

## 2019-04-21 RX ADMIN — Medication 2 PUFF: at 03:03

## 2019-04-21 RX ADMIN — MIDAZOLAM HYDROCHLORIDE 7 MG/HR: 5 INJECTION, SOLUTION INTRAMUSCULAR; INTRAVENOUS at 06:16

## 2019-04-21 RX ADMIN — PROPOFOL 50 MCG/KG/MIN: 10 INJECTION, EMULSION INTRAVENOUS at 12:01

## 2019-04-21 RX ADMIN — CHLORHEXIDINE GLUCONATE 0.12% ORAL RINSE 15 ML: 1.2 LIQUID ORAL at 08:21

## 2019-04-21 RX ADMIN — DIAZEPAM 10 MG: 10 TABLET ORAL at 20:32

## 2019-04-21 RX ADMIN — DIAZEPAM 10 MG: 10 TABLET ORAL at 08:22

## 2019-04-21 RX ADMIN — Medication 2 PUFF: at 11:44

## 2019-04-21 RX ADMIN — SENNOSIDES AND DOCUSATE SODIUM 2 TABLET: 8.6; 5 TABLET ORAL at 21:43

## 2019-04-21 RX ADMIN — PROPOFOL 50 MCG/KG/MIN: 10 INJECTION, EMULSION INTRAVENOUS at 00:21

## 2019-04-21 RX ADMIN — Medication 2 PUFF: at 23:07

## 2019-04-21 RX ADMIN — Medication 10 ML: at 20:33

## 2019-04-21 RX ADMIN — DIAZEPAM 10 MG: 10 TABLET ORAL at 15:33

## 2019-04-21 RX ADMIN — CHLORHEXIDINE GLUCONATE 0.12% ORAL RINSE 15 ML: 1.2 LIQUID ORAL at 20:32

## 2019-04-21 RX ADMIN — CARBOXYMETHYLCELLULOSE SODIUM 8 DROP: 10 GEL OPHTHALMIC at 20:33

## 2019-04-21 RX ADMIN — ENOXAPARIN SODIUM 40 MG: 40 INJECTION SUBCUTANEOUS at 08:21

## 2019-04-21 RX ADMIN — MAGNESIUM SULFATE HEPTAHYDRATE 1 G: 1 INJECTION, SOLUTION INTRAVENOUS at 11:42

## 2019-04-21 RX ADMIN — PROPOFOL 50 MCG/KG/MIN: 10 INJECTION, EMULSION INTRAVENOUS at 15:34

## 2019-04-21 RX ADMIN — DEXMEDETOMIDINE HYDROCHLORIDE 0.4 MCG/KG/HR: 100 INJECTION, SOLUTION INTRAVENOUS at 00:45

## 2019-04-21 RX ADMIN — VANCOMYCIN HYDROCHLORIDE 1250 MG: 10 INJECTION, POWDER, LYOPHILIZED, FOR SOLUTION INTRAVENOUS at 17:15

## 2019-04-21 RX ADMIN — SODIUM CHLORIDE: 9 INJECTION, SOLUTION INTRAVENOUS at 15:34

## 2019-04-21 RX ADMIN — CEFEPIME 2 G: 2 INJECTION, POWDER, FOR SOLUTION INTRAVENOUS at 21:38

## 2019-04-21 RX ADMIN — FENTANYL CITRATE 175 MCG/HR: 50 INJECTION, SOLUTION INTRAMUSCULAR; INTRAVENOUS at 00:52

## 2019-04-21 RX ADMIN — Medication 2 PUFF: at 19:58

## 2019-04-21 RX ADMIN — Medication 2 PUFF: at 07:54

## 2019-04-21 RX ADMIN — FENTANYL CITRATE 175 MCG/HR: 50 INJECTION, SOLUTION INTRAMUSCULAR; INTRAVENOUS at 06:16

## 2019-04-21 RX ADMIN — PROPOFOL 50 MCG/KG/MIN: 10 INJECTION, EMULSION INTRAVENOUS at 20:33

## 2019-04-21 RX ADMIN — Medication 10 ML: at 08:21

## 2019-04-21 RX ADMIN — PROPOFOL 50 MCG/KG/MIN: 10 INJECTION, EMULSION INTRAVENOUS at 05:43

## 2019-04-21 RX ADMIN — MAGNESIUM SULFATE HEPTAHYDRATE 1 G: 1 INJECTION, SOLUTION INTRAVENOUS at 10:15

## 2019-04-21 RX ADMIN — PROPOFOL 50 MCG/KG/MIN: 10 INJECTION, EMULSION INTRAVENOUS at 08:12

## 2019-04-21 RX ADMIN — CEFEPIME 2 G: 2 INJECTION, POWDER, FOR SOLUTION INTRAVENOUS at 12:04

## 2019-04-21 RX ADMIN — PROPOFOL 50 MCG/KG/MIN: 10 INJECTION, EMULSION INTRAVENOUS at 23:45

## 2019-04-21 RX ADMIN — SENNOSIDES AND DOCUSATE SODIUM 2 TABLET: 8.6; 5 TABLET ORAL at 08:22

## 2019-04-21 ASSESSMENT — PAIN SCALES - GENERAL: PAINLEVEL_OUTOF10: 0

## 2019-04-21 ASSESSMENT — PULMONARY FUNCTION TESTS
PIF_VALUE: 18
PIF_VALUE: 17
PIF_VALUE: 19
PIF_VALUE: 21
PIF_VALUE: 20
PIF_VALUE: 17
PIF_VALUE: 15
PIF_VALUE: 19
PIF_VALUE: 17
PIF_VALUE: 18
PIF_VALUE: 15
PIF_VALUE: 22
PIF_VALUE: 16
PIF_VALUE: 20
PIF_VALUE: 16
PIF_VALUE: 21
PIF_VALUE: 19
PIF_VALUE: 15
PIF_VALUE: 20
PIF_VALUE: 19
PIF_VALUE: 21

## 2019-04-21 NOTE — PROGRESS NOTES
Progress Note    Admit Date:  4/15/2019    Admitted with agitation, acute resp failure     Per pt's mother , he has never acted this way in the past. His father is bipolar and his mother reports that the patient has gone through rehab before but has never seen a psychiatrist before, but is very concerned with his behaviors stating that he had been sober for 8 months and doing well. Subjective:  Mr. Annabel Felton was very agitated, required heavy sedation, intubated for airway protection    on Mech vent    on propofol, versed and fentanyl gtt   sats stable     4/19: still very agitated. spiked temps to 103 F   plan bronch today. blood cultures sent     IVabx started     4/20  Fevers of upto 102     Objective:   BP (!) 101/59   Pulse 80   Temp 98.4 °F (36.9 °C) (Axillary)   Resp 20   Ht 5' 11\" (1.803 m)   Wt 197 lb 6.4 oz (89.5 kg)   SpO2 97%   BMI 27.53 kg/m²       Intake/Output Summary (Last 24 hours) at 4/21/2019 1300  Last data filed at 4/21/2019 1248  Gross per 24 hour   Intake 3822 ml   Output 3005 ml   Net 817 ml         Physical Exam:  General:  Intubated, sedated   Skin:  Warm and dry  Neck:  JVD absent. Neck supple  Chest:  Clear to auscultation, respiration easy. No wheezes, rales or rhonchi. Cardiovascular:  RRR ,S1S2 normal  Abdomen:  Soft, non tender, non distended, BS +  Extremities:  No edema. Intact peripheral pulses.  Brisk cap refill, < 2 secs  Neuro: non focal      Medications:   Scheduled Meds:   vancomycin  1,250 mg Intravenous Q8H    ipratropium  2 puff Inhalation Q4H    albuterol sulfate HFA  2 puff Inhalation Q4H    cefepime  2 g Intravenous Q8H    chlorhexidine  15 mL Mouth/Throat BID    fentanNYL  50 mcg Intravenous Once    midazolam  2 mg Intravenous Once    diazepam  10 mg Oral TID    sennosides-docusate sodium  2 tablet Oral BID    folic acid, thiamine, multi-vitamin with vitamin K infusion   Intravenous Once    sodium chloride flush  10 mL Intravenous 2 times per day    enoxaparin  40 mg Subcutaneous Daily    mupirocin   Nasal BID       Continuous Infusions:   dexmedetomidine (PRECEDEX) IV infusion 0.4 mcg/kg/hr (04/21/19 1139)    dextrose      fentaNYL (SUBLIMAZE) infusion 100 mcg/hr (04/21/19 1142)    sodium chloride 100 mL/hr at 04/19/19 2116    propofol 50 mcg/kg/min (04/21/19 1201)    midazolam 5 mg/hr (04/21/19 0921)       Data:  CBC:   Recent Labs     04/19/19  0530 04/20/19  0621 04/21/19  0600   WBC 7.3 6.7 8.4   RBC 3.58* 3.39* 2.97*   HGB 11.4* 10.6* 9.5*   HCT 33.2* 31.5* 28.0*   MCV 92.9 92.8 94.3   RDW 13.6 13.1 13.7    188 190     BMP:   Recent Labs     04/19/19  0530 04/20/19  0540 04/21/19  0600    137 135*   K 3.7 4.1 3.8    104 101   CO2 28 25 27   PHOS 2.5 2.7 2.1*   BUN 8 10 11   CREATININE 0.9 0.9 0.9     BNP: No results for input(s): BNP in the last 72 hours. PT/INR: No results for input(s): PROTIME, INR in the last 72 hours. APTT: No results for input(s): APTT in the last 72 hours. CARDIAC ENZYMES: No results for input(s): CKMB, CKMBINDEX, TROPONINI in the last 72 hours. Invalid input(s): CKTOTAL;3  FASTING LIPID PANEL:  Lab Results   Component Value Date    TRIG 150 04/19/2019     LIVER PROFILE:   No results for input(s): AST, ALT, ALB, BILIDIR, BILITOT, ALKPHOS in the last 72 hours. XR CHEST PORTABLE   Final Result   Stable lines and tubes. Left pleural effusion with left basilar atelectasis, stable. XR CHEST PORTABLE   Final Result   Basilar airspace disease, atelectasis or pneumonia. XR CHEST PORTABLE   Final Result   Subtle left streaky basilar opacity which may represent atelectasis or   developing infection. XR CHEST PORTABLE   Final Result   No evidence for acute cardiopulmonary process. XR ABDOMEN (KUB) (SINGLE AP VIEW)   Final Result   No foreign body is identified. Normal bowel gas pattern.          XR CHEST PORTABLE   Final Result   Low volume study with vascular crowding versus mild edema, similar to prior. XR CHEST PORTABLE   Final Result   New life support apparatus as above. Mild edema. No pneumothorax. XR CHEST STANDARD (2 VW)   Final Result   No acute cardiopulmonary process. CT Head WO Contrast   Final Result   No acute intracranial abnormality or significant interval change from prior   study 10/01/2016. XR CHEST PORTABLE    (Results Pending)         Assessment:  Active Problems:    History of mixed drug abuse    Acute encephalopathy    Psychosis (HCC)    Agitation    Polysubstance abuse (HCC)    Acute respiratory failure (HCC)    Paranoia (HCC)    Combative behavior    Disorder of electrolytes    Sinus tachycardia    Altered mental status    Pneumonia of left lower lobe due to Pseudomonas species ([facility] [address]  Resolved Problems:    * No resolved hospital problems. *      Plan:    Acute encephalopathy  - suspect related to substance abuse based on patient's history, yet etiology is not entirely clear  - UDS+ THC only   - head CT without acute findings  - initially admitted to medical floor but became severely agitated, code violet called, and transferred to ICU  - Multiple sedating meds administered in ER, see MAR. Hald 5 mg IM and 25 mg IV benadryl now   -  on propofol, versed and fentanyl gtt  - Valium TID, precedex gtt started 4/18  - cont PRN ativan   - intensivist c/s  - psychiatry c/s     acute resp failure  - intubated for airway protection  - pt requiring heavy sedation      acute febrile illness  - likely aspiration PNA  - increased resp secretions  - bronch today . Check cultures  - started on vanc and cefepime  H. Influenza in resp culture     Polysubstance abuse  - UDS + THC only today  - based on EMR- patient with prior fentanyl abuse      Hypokalemia  - replaced in ER, monitor BMP       DVT Prophylaxis: Lovenox   DIET TUBE FEED CONTINUOUS/CYCLIC NPO; 1.5 Calorie with Fiber (Jevity 1.5);  Orogastric; Continuous; 20; 40; 20  Dietary Nutrition Supplements: Protein Modular  Code Status: Full Code        Danielle Cuellar MD  4/21/2019 1:00 PM

## 2019-04-21 NOTE — PROGRESS NOTES
Pulmonary & Critical Care Medicine ICU Progress Note  CC:Acute respiratory failure with hypoxemia    Events of Last 24 hours: patient remains febrile. Invasive Lines:   IV Line: IJ 4/16    MV:  4/16    Vent Mode: AC/VC Rate Set: 14 bmp/Vt Ordered: 450 mL/ /FiO2 : 30 %  Recent Labs     04/20/19  0530 04/21/19  0600   PHART 7.371 7.460*   [id number] 45.2* 37.4   PO2ART 123.8* 85.9       IV:   dexmedetomidine (PRECEDEX) IV infusion 0.4 mcg/kg/hr (04/21/19 0045)    dextrose      fentaNYL (SUBLIMAZE) infusion 175 mcg/hr (04/21/19 0616)    sodium chloride 100 mL/hr at 04/19/19 2116    propofol 50 mcg/kg/min (04/21/19 0543)    midazolam 7 mg/hr (04/21/19 0616)       EXAM:  BP (!) 100/57   Pulse 83   Temp 100.2 °F (37.9 °C) (Axillary)   Resp 21   Ht 5' 11\" (1.803 m)   Wt 197 lb 6.4 oz (89.5 kg)   SpO2 96%   BMI 27.53 kg/m²  on vent  Tmax:102.4  CVP:      Intake/Output Summary (Last 24 hours) at 4/21/2019 0706  Last data filed at 4/21/2019 0616  Gross per 24 hour   Intake 4239 ml   Output 2120 ml   Net 2119 ml     General: No distress. NCAT. Eyes: PERRL. No sclera icterus. No conjunctival injection. ENT: No discharge. Pharynx clear. Neck: Trachea midline. Normal thyroid. Resp: No accessory muscle use. No  crackles. No wheezing. bilateral rhonchi. No dullness on percussion. CV: tachy rate. Regular rhythm. No mumur or rub. No edema. GI: Non-tender. Non-distended. No masses. Skin: Warm and dry. No nodule on exposed extremities. No rash on exposed extremities. Lymph: No cervical LAD. No supraclavicular LAD. M/S: No cyanosis. No joint deformity. No clubbing.    Neuro: sedated, not moving extremities, + pupillary response    Medications:   vancomycin  1,250 mg Intravenous Q8H    ipratropium  2 puff Inhalation Q4H    albuterol sulfate HFA  2 puff Inhalation Q4H    cefepime  2 g Intravenous Q8H    chlorhexidine  15 mL Mouth/Throat BID    fentanNYL  50 mcg Intravenous Once    midazolam  2 mg Intravenous Once    diazepam  10 mg Oral TID    sennosides-docusate sodium  2 tablet Oral BID    folic acid, thiamine, multi-vitamin with vitamin K infusion   Intravenous Once    sodium chloride flush  10 mL Intravenous 2 times per day    enoxaparin  40 mg Subcutaneous Daily    mupirocin   Nasal BID     PRN Meds:  fentanNYL, midazolam, glucose, dextrose, glucagon (rDNA), dextrose, medicated lip balm, sodium chloride flush, magnesium hydroxide, ondansetron, potassium chloride **OR** potassium alternative oral replacement **OR** potassium chloride, magnesium sulfate, acetaminophen, [] traMADol **AND** cloNIDine    Results:  CBC:   Recent Labs     19  0530 19  0621 19  0600   WBC 7.3 6.7 8.4   HGB 11.4* 10.6* 9.5*   HCT 33.2* 31.5* 28.0*   MCV 92.9 92.8 94.3    188 190     BMP:   Recent Labs     19  0530 19  0540 19  0600    137 135*   K 3.7 4.1 3.8    104 101   CO2 28 25 27   PHOS 2.5 2.7 2.1*   BUN 8 10 11   CREATININE 0.9 0.9 0.9     LIVER PROFILE: No results for input(s): AST, ALT, LIPASE, BILIDIR, BILITOT, ALKPHOS in the last 72 hours. Invalid input(s): AMYLASE,  ALB  PT/INR: No results for input(s): PROTIME, INR in the last 72 hours. APTT: No results for input(s): APTT in the last 72 hours. UA:No results for input(s): NITRITE, COLORU, PHUR, LABCAST, WBCUA, RBCUA, MUCUS, TRICHOMONAS, YEAST, BACTERIA, CLARITYU, SPECGRAV, LEUKOCYTESUR, UROBILINOGEN, BILIRUBINUR, BLOODU, GLUCOSEU, AMORPHOUS in the last 72 hours. Invalid input(s): Albertina Opal    Cultures:  Bld: +CNS  BAL -  H influenzae    Films:  CXR : Streaky LLL airspace opacities, ETT in place    Assessment:  · Acute respiratory failure with hypoxemia  · Acute encephalopathy  · Agitation  · polysubstance abuse  · electrolyte disturbance  · New fever- possible LLL HCAP      Plan:  · Mechanical ventilation per my orders.  The ventilator was adjusted by me at the bedside for unstable,

## 2019-04-21 NOTE — PROGRESS NOTES
Vancomycin trough on 1250 mg IV = 7.2 mcg/ ml. Will increase vancomycin dosing frequency to q8h. Next 1250 mg dose due 4/21 @ 0800. A new vancomycin trough has been ordered for 4/22 @ 0730.

## 2019-04-21 NOTE — PROGRESS NOTES
Handoff report to [facility] RN for transfer of care. IV drips reviewed, all ICU monitoring lines intact.

## 2019-04-21 NOTE — PROGRESS NOTES
Vancomycin Day # 3  Current dose = changed to 1250 mg IV q8h last night  BUN/SRCR 11/0.9      Est CrCl = 119 ml/min  WBC   8.4            Tmax 102.4  Vancomycin trough tomorrow morning @ 0730. Continue same until then.

## 2019-04-21 NOTE — PROGRESS NOTES
04/20/19 2309   Vent Information   Vent Type 840   Vent Mode AC/VC   Vt Ordered 450 mL   Rate Set 14 bmp   Peak Flow 60 L/min   Pressure Support 0 cmH20   FiO2  30 %   Sensitivity 3   PEEP/CPAP 5   I Time/ I Time % 0 s   Humidification Source Heated wire   Humidification Temp 36.9   Humidification Temp Measured 36.9   Circuit Condensation Drained   Vent Patient Data   High Peep/I Pressure 0   Peak Inspiratory Pressure 16 cmH2O   Mean Airway Pressure 6.2 cmH20   Rate Measured 20 br/min   Vt Exhaled 484 mL   Minute Volume 9.49 Liters   I:E Ratio 1:2.50   Cough/Sputum   Sputum How Obtained Endotracheal;Suctioned   Cough Productive   Sputum Amount Moderate   Sputum Color Dark Yellow   Tenacity Thick   Spontaneous Breathing Trial (SBT) RT Doc   Pulse 95   SpO2 97 %   Breath Sounds   Right Upper Lobe Rhonchi   Right Middle Lobe Rhonchi   Right Lower Lobe Diminished   Left Upper Lobe Rhonchi   Left Lower Lobe Diminished   Additional Respiratory  Assessments   Resp 19   Position Semi-Contreras's   Alarm Settings   High Pressure Alarm 40 cmH2O   Low Minute Volume Alarm 4 L/min   Apnea (secs) 20 secs   High Respiratory Rate 40 br/min   Patient Observation   Observations ETT SIZE 7.5, SECURED AT 25 LIP LINE. AMBU BAG AT HEAD OF BED. WATER BAG GOOD. ETT (adult)   Placement Date/Time: 04/16/19 1542   Timeout: Patient;Procedure;Site/Side;Appropriate Equipment  Preoxygenation: Yes  Mask Ventilation: Ventilated by mask (1)  Technique: Rapid sequence  Type: Cuffed  Tube Size: 8 mm  Laryngoscope: GlideScope  Locatio. ..    Secured at 25 cm   Measured From Lips   ET Placement Right   Secured By Commercial tube pritchett   Site Condition Dry

## 2019-04-21 NOTE — PROGRESS NOTES
Reassessed, no changes in physical assessment. NT suctioned R nostril for large amount lt yellow thick mucus. POC and status reviewed with mother at bedside    Continuing to titrate IV sedation for RASS goal -2. See MAR. VIKTORIA soft wrist restraints in use due to medical tubing. All ICU monitoring lines, ETtube, OG tube, Ontiveros in place, secured. TF continues at goal rate, residual 10.

## 2019-04-21 NOTE — PROGRESS NOTES
04/21/19 0303   Vent Information   Vent Type 840   Vent Mode AC/VC   Vt Ordered 450 mL   Rate Set 14 bmp   Peak Flow 60 L/min   Pressure Support 0 cmH20   FiO2  30 %   Sensitivity 3   PEEP/CPAP 5   I Time/ I Time % 0 s   Humidification Source Heated wire   Humidification Temp 37   Humidification Temp Measured 37   Circuit Condensation Drained   Vent Patient Data   High Peep/I Pressure 0   Peak Inspiratory Pressure 17 cmH2O   Mean Airway Pressure 7.2 cmH20   Rate Measured 21 br/min   Vt Exhaled 489 mL   Minute Volume 10.3 Liters   I:E Ratio 1:2.30   Cough/Sputum   Sputum How Obtained Endotracheal;Suctioned   Cough Productive   Sputum Amount Moderate   Sputum Color Dark Yellow   Tenacity Thick   Spontaneous Breathing Trial (SBT) RT Doc   Pulse 87   SpO2 96 %   Breath Sounds   Right Upper Lobe Rhonchi   Right Middle Lobe Diminished   Right Lower Lobe Diminished   Left Upper Lobe Rhonchi   Left Lower Lobe Diminished   Additional Respiratory  Assessments   Resp 20   Position Semi-Contreras's   Alarm Settings   High Pressure Alarm 40 cmH2O   Low Minute Volume Alarm 4 L/min   Apnea (secs) 20 secs   High Respiratory Rate 40 br/min   Patient Observation   Observations ETT SIZE 7.5, SECURED AT 25 LIP LINE. AMBU BAG AT HEAD OF BED. WATER BAG GOOD. ETT (adult)   Placement Date/Time: 04/16/19 1542   Timeout: Patient;Procedure;Site/Side;Appropriate Equipment  Preoxygenation: Yes  Mask Ventilation: Ventilated by mask (1)  Technique: Rapid sequence  Type: Cuffed  Tube Size: 8 mm  Laryngoscope: GlideScope  Locatio. ..    Secured at 25 cm   Measured From Lips   ET Placement Right   Secured By Commercial tube pritchett   Site Condition Dry

## 2019-04-22 ENCOUNTER — APPOINTMENT (OUTPATIENT)
Dept: GENERAL RADIOLOGY | Age: 39
DRG: 052 | End: 2019-04-22
Payer: COMMERCIAL

## 2019-04-22 LAB
ANION GAP SERPL CALCULATED.3IONS-SCNC: 7 MMOL/L (ref 3–16)
BASE EXCESS ARTERIAL: 3.1 MMOL/L (ref -3–3)
BASOPHILS ABSOLUTE: 0 K/UL (ref 0–0.2)
BASOPHILS RELATIVE PERCENT: 0.2 %
BLOOD CULTURE, ROUTINE: ABNORMAL
BUN BLDV-MCNC: 9 MG/DL (ref 7–20)
CALCIUM SERPL-MCNC: 8.3 MG/DL (ref 8.3–10.6)
CARBOXYHEMOGLOBIN ARTERIAL: 0.3 % (ref 0–1.5)
CHLORIDE BLD-SCNC: 106 MMOL/L (ref 99–110)
CO2: 26 MMOL/L (ref 21–32)
CREAT SERPL-MCNC: 0.7 MG/DL (ref 0.9–1.3)
EOSINOPHILS ABSOLUTE: 0.4 K/UL (ref 0–0.6)
EOSINOPHILS RELATIVE PERCENT: 4.5 %
GFR AFRICAN AMERICAN: >60
GFR NON-AFRICAN AMERICAN: >60
GLUCOSE BLD-MCNC: 112 MG/DL (ref 70–99)
GLUCOSE BLD-MCNC: 124 MG/DL (ref 70–99)
GLUCOSE BLD-MCNC: 126 MG/DL (ref 70–99)
GLUCOSE BLD-MCNC: 127 MG/DL (ref 70–99)
GLUCOSE BLD-MCNC: 132 MG/DL (ref 70–99)
GLUCOSE BLD-MCNC: 135 MG/DL (ref 70–99)
GLUCOSE BLD-MCNC: 99 MG/DL (ref 70–99)
HCO3 ARTERIAL: 26.3 MMOL/L (ref 21–29)
HCT VFR BLD CALC: 28.3 % (ref 40.5–52.5)
HEMOGLOBIN, ART, EXTENDED: 10.2 G/DL (ref 13.5–17.5)
HEMOGLOBIN: 9.9 G/DL (ref 13.5–17.5)
LYMPHOCYTES ABSOLUTE: 0.6 K/UL (ref 1–5.1)
LYMPHOCYTES RELATIVE PERCENT: 7.5 %
MAGNESIUM: 1.7 MG/DL (ref 1.8–2.4)
MCH RBC QN AUTO: 32.5 PG (ref 26–34)
MCHC RBC AUTO-ENTMCNC: 35 G/DL (ref 31–36)
MCV RBC AUTO: 92.9 FL (ref 80–100)
METHEMOGLOBIN ARTERIAL: 0.5 %
MONOCYTES ABSOLUTE: 0.6 K/UL (ref 0–1.3)
MONOCYTES RELATIVE PERCENT: 7.2 %
NEUTROPHILS ABSOLUTE: 6.4 K/UL (ref 1.7–7.7)
NEUTROPHILS RELATIVE PERCENT: 80.6 %
O2 CONTENT ARTERIAL: 14 ML/DL
O2 SAT, ARTERIAL: 98.6 %
O2 THERAPY: ABNORMAL
ORGANISM: ABNORMAL
ORGANISM: ABNORMAL
PCO2 ARTERIAL: 35.1 MMHG (ref 35–45)
PDW BLD-RTO: 14.1 % (ref 12.4–15.4)
PERFORMED ON: ABNORMAL
PERFORMED ON: NORMAL
PH ARTERIAL: 7.49 (ref 7.35–7.45)
PHOSPHORUS: 2.5 MG/DL (ref 2.5–4.9)
PLATELET # BLD: 236 K/UL (ref 135–450)
PMV BLD AUTO: 6.7 FL (ref 5–10.5)
PO2 ARTERIAL: 119.5 MMHG (ref 75–108)
POTASSIUM SERPL-SCNC: 3.5 MMOL/L (ref 3.5–5.1)
RBC # BLD: 3.05 M/UL (ref 4.2–5.9)
SODIUM BLD-SCNC: 139 MMOL/L (ref 136–145)
TCO2 ARTERIAL: 27.4 MMOL/L
TOTAL CK: 307 U/L (ref 39–308)
VANCOMYCIN TROUGH: 18.9 UG/ML (ref 10–20)
WBC # BLD: 7.9 K/UL (ref 4–11)

## 2019-04-22 PROCEDURE — 6370000000 HC RX 637 (ALT 250 FOR IP): Performed by: INTERNAL MEDICINE

## 2019-04-22 PROCEDURE — 6360000002 HC RX W HCPCS: Performed by: INTERNAL MEDICINE

## 2019-04-22 PROCEDURE — 99232 SBSQ HOSP IP/OBS MODERATE 35: CPT | Performed by: INTERNAL MEDICINE

## 2019-04-22 PROCEDURE — 85025 COMPLETE CBC W/AUTO DIFF WBC: CPT

## 2019-04-22 PROCEDURE — 2580000003 HC RX 258: Performed by: INTERNAL MEDICINE

## 2019-04-22 PROCEDURE — 82803 BLOOD GASES ANY COMBINATION: CPT

## 2019-04-22 PROCEDURE — 87070 CULTURE OTHR SPECIMN AEROBIC: CPT

## 2019-04-22 PROCEDURE — 6370000000 HC RX 637 (ALT 250 FOR IP): Performed by: PHYSICIAN ASSISTANT

## 2019-04-22 PROCEDURE — 80048 BASIC METABOLIC PNL TOTAL CA: CPT

## 2019-04-22 PROCEDURE — 94003 VENT MGMT INPAT SUBQ DAY: CPT

## 2019-04-22 PROCEDURE — 6360000002 HC RX W HCPCS: Performed by: PHYSICIAN ASSISTANT

## 2019-04-22 PROCEDURE — 94762 N-INVAS EAR/PLS OXIMTRY CONT: CPT

## 2019-04-22 PROCEDURE — 80202 ASSAY OF VANCOMYCIN: CPT

## 2019-04-22 PROCEDURE — 2000000000 HC ICU R&B

## 2019-04-22 PROCEDURE — 82550 ASSAY OF CK (CPK): CPT

## 2019-04-22 PROCEDURE — 2580000003 HC RX 258

## 2019-04-22 PROCEDURE — 2700000000 HC OXYGEN THERAPY PER DAY

## 2019-04-22 PROCEDURE — 99291 CRITICAL CARE FIRST HOUR: CPT | Performed by: INTERNAL MEDICINE

## 2019-04-22 PROCEDURE — 89220 SPUTUM SPECIMEN COLLECTION: CPT

## 2019-04-22 PROCEDURE — 71045 X-RAY EXAM CHEST 1 VIEW: CPT

## 2019-04-22 PROCEDURE — 84100 ASSAY OF PHOSPHORUS: CPT

## 2019-04-22 PROCEDURE — 94640 AIRWAY INHALATION TREATMENT: CPT

## 2019-04-22 PROCEDURE — 94750 HC PULMONARY COMPLIANCE STUDY: CPT

## 2019-04-22 PROCEDURE — 87205 SMEAR GRAM STAIN: CPT

## 2019-04-22 PROCEDURE — 83735 ASSAY OF MAGNESIUM: CPT

## 2019-04-22 PROCEDURE — 2580000003 HC RX 258: Performed by: PHYSICIAN ASSISTANT

## 2019-04-22 RX ORDER — SODIUM CHLORIDE 9 MG/ML
INJECTION, SOLUTION INTRAVENOUS
Status: COMPLETED
Start: 2019-04-22 | End: 2019-04-22

## 2019-04-22 RX ORDER — MIDAZOLAM HYDROCHLORIDE 1 MG/ML
5 INJECTION INTRAMUSCULAR; INTRAVENOUS
Status: DISCONTINUED | OUTPATIENT
Start: 2019-04-22 | End: 2019-04-24

## 2019-04-22 RX ORDER — FAMOTIDINE 20 MG/1
20 TABLET, FILM COATED ORAL 2 TIMES DAILY
Status: DISCONTINUED | OUTPATIENT
Start: 2019-04-22 | End: 2019-04-25

## 2019-04-22 RX ORDER — POLYETHYLENE GLYCOL 3350 17 G/17G
17 POWDER, FOR SOLUTION ORAL DAILY
Status: DISCONTINUED | OUTPATIENT
Start: 2019-04-22 | End: 2019-04-25

## 2019-04-22 RX ADMIN — MIDAZOLAM HYDROCHLORIDE 5 MG: 2 INJECTION, SOLUTION INTRAMUSCULAR; INTRAVENOUS at 20:22

## 2019-04-22 RX ADMIN — Medication 2 PUFF: at 02:50

## 2019-04-22 RX ADMIN — MIDAZOLAM HYDROCHLORIDE 5 MG: 2 INJECTION, SOLUTION INTRAMUSCULAR; INTRAVENOUS at 16:44

## 2019-04-22 RX ADMIN — Medication 2 PUFF: at 22:48

## 2019-04-22 RX ADMIN — DIAZEPAM 10 MG: 10 TABLET ORAL at 08:22

## 2019-04-22 RX ADMIN — Medication 2 PUFF: at 15:04

## 2019-04-22 RX ADMIN — MIDAZOLAM HYDROCHLORIDE 5 MG: 2 INJECTION, SOLUTION INTRAMUSCULAR; INTRAVENOUS at 18:41

## 2019-04-22 RX ADMIN — PROPOFOL 50 MCG/KG/MIN: 10 INJECTION, EMULSION INTRAVENOUS at 06:53

## 2019-04-22 RX ADMIN — CEFEPIME 2 G: 2 INJECTION, POWDER, FOR SOLUTION INTRAVENOUS at 12:23

## 2019-04-22 RX ADMIN — Medication 2 PUFF: at 11:28

## 2019-04-22 RX ADMIN — PROPOFOL 50 MCG/KG/MIN: 10 INJECTION, EMULSION INTRAVENOUS at 14:46

## 2019-04-22 RX ADMIN — FAMOTIDINE 20 MG: 20 TABLET, FILM COATED ORAL at 20:44

## 2019-04-22 RX ADMIN — CEFEPIME 2 G: 2 INJECTION, POWDER, FOR SOLUTION INTRAVENOUS at 20:44

## 2019-04-22 RX ADMIN — CHLORHEXIDINE GLUCONATE 0.12% ORAL RINSE 15 ML: 1.2 LIQUID ORAL at 08:22

## 2019-04-22 RX ADMIN — Medication 2 PUFF: at 07:19

## 2019-04-22 RX ADMIN — PROPOFOL 50 MCG/KG/MIN: 10 INJECTION, EMULSION INTRAVENOUS at 18:41

## 2019-04-22 RX ADMIN — MIDAZOLAM HYDROCHLORIDE 5 MG/HR: 5 INJECTION, SOLUTION INTRAMUSCULAR; INTRAVENOUS at 00:33

## 2019-04-22 RX ADMIN — CHLORHEXIDINE GLUCONATE 0.12% ORAL RINSE 15 ML: 1.2 LIQUID ORAL at 20:43

## 2019-04-22 RX ADMIN — SENNOSIDES AND DOCUSATE SODIUM 2 TABLET: 8.6; 5 TABLET ORAL at 08:22

## 2019-04-22 RX ADMIN — VANCOMYCIN HYDROCHLORIDE 1250 MG: 10 INJECTION, POWDER, LYOPHILIZED, FOR SOLUTION INTRAVENOUS at 08:22

## 2019-04-22 RX ADMIN — VANCOMYCIN HYDROCHLORIDE 1250 MG: 10 INJECTION, POWDER, LYOPHILIZED, FOR SOLUTION INTRAVENOUS at 16:44

## 2019-04-22 RX ADMIN — SODIUM CHLORIDE: 9 INJECTION, SOLUTION INTRAVENOUS at 14:48

## 2019-04-22 RX ADMIN — FAMOTIDINE 20 MG: 20 TABLET, FILM COATED ORAL at 08:31

## 2019-04-22 RX ADMIN — Medication 10 ML: at 20:44

## 2019-04-22 RX ADMIN — POLYETHYLENE GLYCOL (3350) 17 G: 17 POWDER, FOR SOLUTION ORAL at 11:50

## 2019-04-22 RX ADMIN — Medication 2 PUFF: at 11:29

## 2019-04-22 RX ADMIN — ACETAMINOPHEN 650 MG: 325 TABLET ORAL at 00:10

## 2019-04-22 RX ADMIN — SENNOSIDES AND DOCUSATE SODIUM 2 TABLET: 8.6; 5 TABLET ORAL at 22:32

## 2019-04-22 RX ADMIN — Medication 2 PUFF: at 19:35

## 2019-04-22 RX ADMIN — DIAZEPAM 10 MG: 10 TABLET ORAL at 14:48

## 2019-04-22 RX ADMIN — POTASSIUM BICARBONATE 40 MEQ: 782 TABLET, EFFERVESCENT ORAL at 08:22

## 2019-04-22 RX ADMIN — PROPOFOL 50 MCG/KG/MIN: 10 INJECTION, EMULSION INTRAVENOUS at 11:49

## 2019-04-22 RX ADMIN — SODIUM CHLORIDE 250 ML: 9 INJECTION, SOLUTION INTRAVENOUS at 08:30

## 2019-04-22 RX ADMIN — ENOXAPARIN SODIUM 40 MG: 40 INJECTION SUBCUTANEOUS at 08:23

## 2019-04-22 RX ADMIN — ACETAMINOPHEN 650 MG: 325 TABLET ORAL at 20:52

## 2019-04-22 RX ADMIN — VANCOMYCIN HYDROCHLORIDE 1250 MG: 10 INJECTION, POWDER, LYOPHILIZED, FOR SOLUTION INTRAVENOUS at 00:05

## 2019-04-22 RX ADMIN — Medication 10 ML: at 08:23

## 2019-04-22 RX ADMIN — DIAZEPAM 10 MG: 10 TABLET ORAL at 20:44

## 2019-04-22 RX ADMIN — CEFEPIME 2 G: 2 INJECTION, POWDER, FOR SOLUTION INTRAVENOUS at 04:56

## 2019-04-22 RX ADMIN — PROPOFOL 50 MCG/KG/MIN: 10 INJECTION, EMULSION INTRAVENOUS at 22:33

## 2019-04-22 RX ADMIN — SODIUM CHLORIDE: 9 INJECTION, SOLUTION INTRAVENOUS at 05:45

## 2019-04-22 RX ADMIN — FENTANYL CITRATE 50 MCG/HR: 50 INJECTION, SOLUTION INTRAMUSCULAR; INTRAVENOUS at 05:40

## 2019-04-22 RX ADMIN — FENTANYL CITRATE 75 MCG/HR: 50 INJECTION, SOLUTION INTRAMUSCULAR; INTRAVENOUS at 22:04

## 2019-04-22 ASSESSMENT — PULMONARY FUNCTION TESTS
PIF_VALUE: 20
PIF_VALUE: 19
PIF_VALUE: 19
PIF_VALUE: 26
PIF_VALUE: 20
PIF_VALUE: 27
PIF_VALUE: 19
PIF_VALUE: 20
PIF_VALUE: 21
PIF_VALUE: 18
PIF_VALUE: 27
PIF_VALUE: 21
PIF_VALUE: 32

## 2019-04-22 ASSESSMENT — PAIN SCALES - GENERAL
PAINLEVEL_OUTOF10: 0
PAINLEVEL_OUTOF10: 0

## 2019-04-22 NOTE — PROGRESS NOTES
Progress Note    Admit Date:  4/15/2019    Admitted with agitation, acute resp failure     Per pt's mother , he has never acted this way in the past. His father is bipolar and his mother reports that the patient has gone through rehab before but has never seen a psychiatrist before, but is very concerned with his behaviors stating that he had been sober for 8 months and doing well. Subjective:  Mr. Amanda Barnes was very agitated, required heavy sedation, intubated for airway protection    on Mech vent    on propofol, versed and fentanyl gtt   sats stable     Fevers improving  Now on precedex gtt     Objective:   /62   Pulse 73   Temp 99.8 °F (37.7 °C) (Axillary)   Resp 22   Ht 5' 11\" (1.803 m)   Wt 201 lb 8 oz (91.4 kg)   SpO2 96%   BMI 28.10 kg/m²       Intake/Output Summary (Last 24 hours) at 4/22/2019 0734  Last data filed at 4/22/2019 0659  Gross per 24 hour   Intake 4697 ml   Output 4300 ml   Net 397 ml         Physical Exam:  General:  Intubated, sedated   Oral ETT and OG noted   Skin:  Warm and dry  Neck:  JVD absent. Neck supple  Chest:  Clear to auscultation, respiration easy. No wheezes, rales or rhonchi. Cardiovascular:  RRR ,S1S2 normal  Abdomen:  Soft, non tender, non distended, BS +  Extremities:  No edema. Intact peripheral pulses.  Brisk cap refill, < 2 secs  Neuro: sedated      Medications:   Scheduled Meds:   vancomycin  1,250 mg Intravenous Q8H    ipratropium  2 puff Inhalation Q4H    albuterol sulfate HFA  2 puff Inhalation Q4H    cefepime  2 g Intravenous Q8H    chlorhexidine  15 mL Mouth/Throat BID    fentanNYL  50 mcg Intravenous Once    midazolam  2 mg Intravenous Once    diazepam  10 mg Oral TID    sennosides-docusate sodium  2 tablet Oral BID    folic acid, thiamine, multi-vitamin with vitamin K infusion   Intravenous Once    sodium chloride flush  10 mL Intravenous 2 times per day    enoxaparin  40 mg Subcutaneous Daily       Continuous Infusions:   dexmedetomidine (PRECEDEX) IV infusion 0.4 mcg/kg/hr (04/21/19 1139)    dextrose      fentaNYL (SUBLIMAZE) infusion 50 mcg/hr (04/22/19 0540)    sodium chloride 100 mL/hr at 04/22/19 0545    propofol 50 mcg/kg/min (04/22/19 0653)    midazolam 5 mg/hr (04/22/19 0033)       Data:  CBC:   Recent Labs     04/20/19  0621 04/21/19  0600 04/22/19  0515   WBC 6.7 8.4 7.9   RBC 3.39* 2.97* 3.05*   HGB 10.6* 9.5* 9.9*   HCT 31.5* 28.0* 28.3*   MCV 92.8 94.3 92.9   RDW 13.1 13.7 14.1    190 236     BMP:   Recent Labs     04/20/19  0540 04/21/19  0600 04/22/19  0515    135* 139   K 4.1 3.8 3.5    101 106   CO2 25 27 26   PHOS 2.7 2.1* 2.5   BUN 10 11 9   CREATININE 0.9 0.9 0.7*     BNP: No results for input(s): BNP in the last 72 hours. PT/INR: No results for input(s): PROTIME, INR in the last 72 hours. APTT: No results for input(s): APTT in the last 72 hours. CARDIAC ENZYMES: No results for input(s): CKMB, CKMBINDEX, TROPONINI in the last 72 hours. Invalid input(s): CKTOTAL;3  FASTING LIPID PANEL:  Lab Results   Component Value Date    TRIG 150 04/19/2019     LIVER PROFILE:   No results for input(s): AST, ALT, ALB, BILIDIR, BILITOT, ALKPHOS in the last 72 hours. XR CHEST PORTABLE   Final Result   Similar left basilar atelectasis and small pleural effusion. Mild improved   aeration the right mid lung. Otherwise stable chest.         XR CHEST PORTABLE   Final Result   Stable lines and tubes. Left pleural effusion with left basilar atelectasis, stable. XR CHEST PORTABLE   Final Result   Basilar airspace disease, atelectasis or pneumonia. XR CHEST PORTABLE   Final Result   Subtle left streaky basilar opacity which may represent atelectasis or   developing infection. XR CHEST PORTABLE   Final Result   No evidence for acute cardiopulmonary process. XR ABDOMEN (KUB) (SINGLE AP VIEW)   Final Result   No foreign body is identified. Normal bowel gas pattern. XR CHEST PORTABLE   Final Result   Low volume study with vascular crowding versus mild edema, similar to prior. XR CHEST PORTABLE   Final Result   New life support apparatus as above. Mild edema. No pneumothorax. XR CHEST STANDARD (2 VW)   Final Result   No acute cardiopulmonary process. CT Head WO Contrast   Final Result   No acute intracranial abnormality or significant interval change from prior   study 10/01/2016. XR CHEST PORTABLE    (Results Pending)         Assessment:  Active Problems:    History of mixed drug abuse    Acute encephalopathy    Psychosis (HCC)    Agitation    Polysubstance abuse (HCC)    Acute respiratory failure (HCC)    Paranoia (HCC)    Combative behavior    Disorder of electrolytes    Sinus tachycardia    Altered mental status    Pneumonia of left lower lobe due to Pseudomonas species ([facility] [address]  Resolved Problems:    * No resolved hospital problems. *      Plan:    Acute encephalopathy  - suspect related to substance abuse based on patient's history, yet etiology is not entirely clear  - UDS+ THC only - family reports pt being sober for 8 months   - head CT without acute findings  - initially admitted to medical floor but became severely agitated, code violet called, and transferred to ICU  - Multiple sedating meds administered . -  on propofol, versed and fentanyl gtt  - Valium TID, precedex gtt started 4/18  - cont PRN ativan   - intensivist c/s  - psychiatry c/s     Acute resp failure  - intubated for airway protection  - pt requiring heavy sedation - ongoing weaning      Acute febrile illness  - likely aspiration PNA  - increased resp secretions  - s/p bronch  . Check cultures  - started on vanc and cefepime  H.  Influenza in resp cultures     Polysubstance abuse  - UDS + THC only today  - based on EMR- patient with prior fentanyl abuse      Hypokalemia  - replaced monitor BMP       DVT Prophylaxis: Lovenox   DIET TUBE FEED CONTINUOUS/CYCLIC NPO; 1.5 Calorie with Fiber (Jevity 1.5);  Orogastric; Continuous; 20; 40; 20  Dietary Nutrition Supplements: Protein Modular  Code Status: Full Code        Troy David MD  4/22/2019 7:34 AM

## 2019-04-22 NOTE — PROGRESS NOTES
Reassessment complete. See flow sheet. Pt temp 101. Axillary. Tylenol 650 mg given. No other changes noted. Pt resting comfortably. Will continue to monitor.

## 2019-04-22 NOTE — PROGRESS NOTES
04/22/19 1935   Vent Information   Vent Type 840   Vent Mode AC/VC   Vt Ordered 450 mL   Rate Set 14 bmp   Peak Flow 65 L/min   Pressure Support 0 cmH20   FiO2  30 %   Sensitivity 3   PEEP/CPAP 5   I Time/ I Time % 0 s   Humidification Source Heated wire   Humidification Temp 37   Humidification Temp Measured 37   Vent Patient Data   High Peep/I Pressure 0   Peak Inspiratory Pressure 27 cmH2O   Mean Airway Pressure 13 cmH20   Rate Measured 30 br/min   Vt Exhaled 506 mL   Minute Volume 15.1 Liters   I:E Ratio 1:1.90   Plateau Pressure 12 XYY05   Static Compliance 58 mL/cmH2O   Dynamic Compliance 23 mL/cmH2O   Cough/Sputum   Sputum How Obtained Suctioned;Endotracheal   Cough Productive   Sputum Amount Moderate   Sputum Color Creamy   Tenacity Thick   Spontaneous Breathing Trial (SBT) RT Doc   Pulse 127   SpO2 98 %   Breath Sounds   Right Upper Lobe Rhonchi   Right Middle Lobe Rhonchi   Right Lower Lobe Diminished   Left Upper Lobe Rhonchi   Left Lower Lobe Diminished   Additional Respiratory  Assessments   Resp 30   Position Semi-Contreras's   Oral Care Completed? Yes   Oral Care Mouth suctioned   Subglottic Suction Done? Yes   Alarm Settings   High Pressure Alarm 40 cmH2O   Low Minute Volume Alarm 4 L/min   Apnea (secs) 20 secs   High Respiratory Rate 40 br/min   Low Exhaled Vt  300 mL   ETT (adult)   Placement Date/Time: 04/16/19 8972   Timeout: Patient;Procedure;Site/Side;Appropriate Equipment  Preoxygenation: Yes  Mask Ventilation: Ventilated by mask (1)  Technique: Rapid sequence  Type: Cuffed  Tube Size: 8 mm  Laryngoscope: GlideScope  Locatio. ..    Secured at 25 cm   Measured From Lips   ET Placement Left   Secured By Commercial tube pritchett   Site Condition Dry

## 2019-04-22 NOTE — PROGRESS NOTES
erythema inferior to this. The blistering eruption also has small involvement of upper arm just anterior to the armpit. Lymph: No cervical LAD. No supraclavicular LAD. M/S: No cyanosis. No joint deformity. No clubbing. Neck has good range of motion no sign of stiffness  Neuro: Sedated, not following commands. Patellar reflexes are symmetric  Psych: Unable to obtain because the patient is non-communicative      Scheduled Meds:   vancomycin  1,250 mg Intravenous Q8H    ipratropium  2 puff Inhalation Q4H    albuterol sulfate HFA  2 puff Inhalation Q4H    cefepime  2 g Intravenous Q8H    chlorhexidine  15 mL Mouth/Throat BID    fentanNYL  50 mcg Intravenous Once    midazolam  2 mg Intravenous Once    diazepam  10 mg Oral TID    sennosides-docusate sodium  2 tablet Oral BID    folic acid, thiamine, multi-vitamin with vitamin K infusion   Intravenous Once    sodium chloride flush  10 mL Intravenous 2 times per day    enoxaparin  40 mg Subcutaneous Daily     PRN Meds:  carboxymethylcellulose PF, fentanNYL, midazolam, glucose, dextrose, glucagon (rDNA), dextrose, medicated lip balm, sodium chloride flush, magnesium hydroxide, ondansetron, potassium chloride **OR** potassium alternative oral replacement **OR** potassium chloride, magnesium sulfate, acetaminophen, [] traMADol **AND** cloNIDine    Results:  CBC:   Recent Labs     19  0621 19  0600 19  0515   WBC 6.7 8.4 7.9   HGB 10.6* 9.5* 9.9*   HCT 31.5* 28.0* 28.3*   MCV 92.8 94.3 92.9    190 236     BMP:   Recent Labs     19  0540 19  0600 19  0515    135* 139   K 4.1 3.8 3.5    101 106   CO2 25 27 26   PHOS 2.7 2.1* 2.5   BUN 10 11 9   CREATININE 0.9 0.9 0.7*     LIVER PROFILE: No results for input(s): AST, ALT, LIPASE, BILIDIR, BILITOT, ALKPHOS in the last 72 hours. Invalid input(s):   AMYLASE,  ALB    Cultures:  19 BAL/washings/tracheal aspirate all with H influenza  19 blood coag-negative staph    Films:  CXR 4/22/19 left basilar infiltrate    ASSESSMENT:  · Acute hypoxemic respiratory failure  · Acute encephalopathy/AMS, favor acute toxic encephalopathy based on history provided in EMR. I also spoke with his sister at the bedside  · Agitation - he is requiring decreasing concentrations of sedatives  · Polysubstance abuse  · Fever  · Blistering skin reaction: appears to be a local reaction, ? Exposure to skin irritant or possibly some mechanical injury - monitor    PLAN:  Mechanical ventilation as per my orders. The ventilator was adjusted by me at the bedside for unstable, life threatening respiratory failure. IV Propofol for sedation, target RASS -2, with daily spontaneous awakening trial.  Fentanyl gtt. Versed drip is at 5. Precedex drip is at 0.3  Head of bed 30 degrees or higher at all times  · Daily spontaneous breathing trial once PEEP less than 8, FiO2 less than 55%. · Holding Seroquel   · Cefepime and vancomycin day #4  · Prophylaxis: Bactroban, Lovenox, Pepcid       Total critical care time caring for this patient with life threatening, unstable organ failure, including direct patient contact, management of life support systems, review of data including imaging and labs, discussions with other team members and physicians is 36 minutes so far today, excluding procedures.

## 2019-04-22 NOTE — PROGRESS NOTES
Shift assessment completed the patient resting comfortably. See flow sheets. VSS Mechanically ventilated. Settings:14/450/30/5. Sedation Fentanyl @ 50 mcg/min, Precedex 0.4 mcg/hg/hr, Propofol  50 mcg/kg/min ans Versed 5 mg/hr. Suction PRN, mouth care, L TLC IJ. All ports patent and intact  Ontiveros catheter draing clear yellow urines. No signs of distress noted. Will continue to monitor.

## 2019-04-22 NOTE — PROGRESS NOTES
Pharmacy Vancomycin Consult     Vancomycin Day: 3  Current Dosinmg q8h    Temp max:  99.9    Recent Labs     19  0600 19  0515   BUN 11 9       Recent Labs     19  0600 19  0515   CREATININE 0.9 0.7*       Recent Labs     19  0600 19  0515   WBC 8.4 7.9         Intake/Output Summary (Last 24 hours) at 2019 1342  Last data filed at 2019 1149  Gross per 24 hour   Intake 4919 ml   Output 4065 ml   Net 854 ml       Culture Date      Source                       Results  NGTD    Ht Readings from Last 1 Encounters:   19 5' 11\" (1.803 m)        Wt Readings from Last 1 Encounters:   19 201 lb 8 oz (91.4 kg)         Body mass index is 28.1 kg/m². Estimated Creatinine Clearance: 165 mL/min (A) (based on SCr of 0.7 mg/dL (L)).     Trough: 99.9    Assessment/Plan:  Continue with same rx  Macy WEN [id number] PM  .

## 2019-04-22 NOTE — PROGRESS NOTES
Pt with blistered rash to left shoulder. Area cleansed and clean dry flow placed. Area marked and Dr. Saad Borja updated. No new orders at this time.   Trish Olguin RN

## 2019-04-22 NOTE — PROGRESS NOTES
Updated Dr. Nikky German with pt's progress with weaning sedation. Pt awake and able to follow commands. Ok to stop versed at this time. Use PRNs as needed. Family updated.   Kalie Olguin RN

## 2019-04-22 NOTE — PROGRESS NOTES
Report given to Brando Bermudez RN at bedside for transfer of care. Pt resting comfortably at this time, family at bedside.   Dandy Olguin RN

## 2019-04-22 NOTE — PROGRESS NOTES
04/22/19 0250   Vent Information   Vent Type 840   Vent Mode AC/VC   Vt Ordered 450 mL   Rate Set 14 bmp   Peak Flow 60 L/min   Pressure Support 0 cmH20   FiO2  30 %   Sensitivity 3   PEEP/CPAP 5   I Time/ I Time % 0 s   Humidification Source Heated wire   Humidification Temp 37   Humidification Temp Measured 37.4   Vent Patient Data   High Peep/I Pressure 0   Peak Inspiratory Pressure 21 cmH2O   Mean Airway Pressure 13 cmH20   Rate Measured 28 br/min   Vt Exhaled 447 mL   Minute Volume 5.93 Liters   I:E Ratio 1:4.20   Plateau Pressure 18 GIR41   Cough/Sputum   Sputum How Obtained Suctioned;Endotracheal   Cough Productive   Sputum Amount Small   Sputum Color Creamy   Tenacity Thick   Spontaneous Breathing Trial (SBT) RT Doc   Pulse 85   SpO2 98 %   Breath Sounds   Right Upper Lobe Clear   Right Middle Lobe Clear   Right Lower Lobe Diminished   Left Upper Lobe Clear   Left Lower Lobe Diminished   Additional Respiratory  Assessments   Resp 17   Position Semi-Contreras's   Oral Care Completed? Yes   Oral Care Mouth suctioned   Subglottic Suction Done? Yes   Alarm Settings   High Pressure Alarm 40 cmH2O   Low Minute Volume Alarm 4 L/min   Apnea (secs) 20 secs   High Respiratory Rate 40 br/min   Low Exhaled Vt  300 mL   ETT (adult)   Placement Date/Time: 04/16/19 3902   Timeout: Patient;Procedure;Site/Side;Appropriate Equipment  Preoxygenation: Yes  Mask Ventilation: Ventilated by mask (1)  Technique: Rapid sequence  Type: Cuffed  Tube Size: 8 mm  Laryngoscope: GlideScope  Locatio. ..    Secured at 25 cm   Measured From [address] [place] [address],[address] By Commercial tube pritchett   Site Condition Dry

## 2019-04-22 NOTE — PROGRESS NOTES
04/21/19 1959   Vent Information   $Ventilation $Subsequent Day   Vent Type 840   Vent Mode AC/VC   Vt Ordered 450 mL   Rate Set 14 bmp   Peak Flow 60 L/min   Pressure Support 0 cmH20   FiO2  30 %   Sensitivity 3   PEEP/CPAP 5   I Time/ I Time % 0 s   Cuff Pressure (cm H2O) 30 cm H2O   Humidification Source Heated wire   Humidification Temp 36.9   Humidification Temp Measured 36.9   Circuit Condensation Drained   Vent Patient Data   High Peep/I Pressure 0   Peak Inspiratory Pressure 19 cmH2O   Mean Airway Pressure 11 cmH20   Rate Measured 21 br/min   Vt Exhaled 487 mL   Minute Volume 10.2 Liters   I:E Ratio 1:2.40   Plateau Pressure 19 [id number]   Static Compliance 35 mL/cmH2O   Dynamic Compliance 35 mL/cmH2O   Cough/Sputum   Sputum How Obtained Endotracheal;Suctioned   Cough Productive   Sputum Amount Moderate   Sputum Color Creamy; Yellow   Tenacity Thick   Spontaneous Breathing Trial (SBT) RT Doc   Pulse 79   SpO2 98 %   Breath Sounds   Right Upper Lobe Rhonchi   Right Middle Lobe Rhonchi   Right Lower Lobe Diminished   Left Upper Lobe Rhonchi   Left Lower Lobe Diminished   Additional Respiratory  Assessments   Resp 21   Position Semi-Contreras's   Alarm Settings   High Pressure Alarm 40 cmH2O   Low Minute Volume Alarm 4 L/min   Apnea (secs) 20 secs   High Respiratory Rate 40 br/min   Patient Observation   Observations ETT SIZE 7.5, SECURED AT 25 LIP LINE. AMBU BAG AT HEAD OF BED. WATER BAG GOOD. ETT (adult)   Placement Date/Time: 04/16/19 1542   Timeout: Patient;Procedure;Site/Side;Appropriate Equipment  Preoxygenation: Yes  Mask Ventilation: Ventilated by mask (1)  Technique: Rapid sequence  Type: Cuffed  Tube Size: 8 mm  Laryngoscope: GlideScope  Locatio. ..    Secured at 25 cm   Measured From Lips   ET Placement Right   Secured By Commercial tube pritchett   Site Condition Dry

## 2019-04-23 ENCOUNTER — APPOINTMENT (OUTPATIENT)
Dept: GENERAL RADIOLOGY | Age: 39
DRG: 052 | End: 2019-04-23
Payer: COMMERCIAL

## 2019-04-23 LAB
ANION GAP SERPL CALCULATED.3IONS-SCNC: 10 MMOL/L (ref 3–16)
BASE EXCESS ARTERIAL: 4.4 MMOL/L (ref -3–3)
BASOPHILS ABSOLUTE: 0 K/UL (ref 0–0.2)
BASOPHILS RELATIVE PERCENT: 0.6 %
BUN BLDV-MCNC: 10 MG/DL (ref 7–20)
CALCIUM SERPL-MCNC: 8.6 MG/DL (ref 8.3–10.6)
CARBOXYHEMOGLOBIN ARTERIAL: 0.5 % (ref 0–1.5)
CHLORIDE BLD-SCNC: 106 MMOL/L (ref 99–110)
CO2: 27 MMOL/L (ref 21–32)
CREAT SERPL-MCNC: 0.6 MG/DL (ref 0.9–1.3)
EOSINOPHILS ABSOLUTE: 0.2 K/UL (ref 0–0.6)
EOSINOPHILS RELATIVE PERCENT: 2.9 %
GFR AFRICAN AMERICAN: >60
GFR NON-AFRICAN AMERICAN: >60
GLUCOSE BLD-MCNC: 114 MG/DL (ref 70–99)
GLUCOSE BLD-MCNC: 116 MG/DL (ref 70–99)
GLUCOSE BLD-MCNC: 121 MG/DL (ref 70–99)
GLUCOSE BLD-MCNC: 132 MG/DL (ref 70–99)
GLUCOSE BLD-MCNC: 132 MG/DL (ref 70–99)
GLUCOSE BLD-MCNC: 170 MG/DL (ref 70–99)
HCO3 ARTERIAL: 27.3 MMOL/L (ref 21–29)
HCT VFR BLD CALC: 29.9 % (ref 40.5–52.5)
HEMOGLOBIN, ART, EXTENDED: 10.7 G/DL (ref 13.5–17.5)
HEMOGLOBIN: 10.4 G/DL (ref 13.5–17.5)
LYMPHOCYTES ABSOLUTE: 0.9 K/UL (ref 1–5.1)
LYMPHOCYTES RELATIVE PERCENT: 12 %
MAGNESIUM: 1.8 MG/DL (ref 1.8–2.4)
MCH RBC QN AUTO: 32.1 PG (ref 26–34)
MCHC RBC AUTO-ENTMCNC: 34.7 G/DL (ref 31–36)
MCV RBC AUTO: 92.4 FL (ref 80–100)
METHEMOGLOBIN ARTERIAL: 0.3 %
MONOCYTES ABSOLUTE: 0.6 K/UL (ref 0–1.3)
MONOCYTES RELATIVE PERCENT: 8.8 %
NEUTROPHILS ABSOLUTE: 5.6 K/UL (ref 1.7–7.7)
NEUTROPHILS RELATIVE PERCENT: 75.7 %
O2 CONTENT ARTERIAL: 15 ML/DL
O2 SAT, ARTERIAL: 98.2 %
O2 THERAPY: ABNORMAL
PCO2 ARTERIAL: 34.8 MMHG (ref 35–45)
PDW BLD-RTO: 14.4 % (ref 12.4–15.4)
PERFORMED ON: ABNORMAL
PH ARTERIAL: 7.51 (ref 7.35–7.45)
PHOSPHORUS: 3.2 MG/DL (ref 2.5–4.9)
PLATELET # BLD: 286 K/UL (ref 135–450)
PMV BLD AUTO: 6.6 FL (ref 5–10.5)
PO2 ARTERIAL: 102 MMHG (ref 75–108)
POTASSIUM SERPL-SCNC: 3.5 MMOL/L (ref 3.5–5.1)
RBC # BLD: 3.23 M/UL (ref 4.2–5.9)
SODIUM BLD-SCNC: 143 MMOL/L (ref 136–145)
TCO2 ARTERIAL: 28.4 MMOL/L
TOTAL CK: 177 U/L (ref 39–308)
TRIGL SERPL-MCNC: 513 MG/DL (ref 0–150)
WBC # BLD: 7.4 K/UL (ref 4–11)

## 2019-04-23 PROCEDURE — 2500000003 HC RX 250 WO HCPCS: Performed by: INTERNAL MEDICINE

## 2019-04-23 PROCEDURE — 2700000000 HC OXYGEN THERAPY PER DAY

## 2019-04-23 PROCEDURE — 89220 SPUTUM SPECIMEN COLLECTION: CPT

## 2019-04-23 PROCEDURE — 87040 BLOOD CULTURE FOR BACTERIA: CPT

## 2019-04-23 PROCEDURE — 71045 X-RAY EXAM CHEST 1 VIEW: CPT

## 2019-04-23 PROCEDURE — 87205 SMEAR GRAM STAIN: CPT

## 2019-04-23 PROCEDURE — 6370000000 HC RX 637 (ALT 250 FOR IP): Performed by: INTERNAL MEDICINE

## 2019-04-23 PROCEDURE — 6360000002 HC RX W HCPCS: Performed by: INTERNAL MEDICINE

## 2019-04-23 PROCEDURE — 82803 BLOOD GASES ANY COMBINATION: CPT

## 2019-04-23 PROCEDURE — 87086 URINE CULTURE/COLONY COUNT: CPT

## 2019-04-23 PROCEDURE — 94003 VENT MGMT INPAT SUBQ DAY: CPT

## 2019-04-23 PROCEDURE — 94762 N-INVAS EAR/PLS OXIMTRY CONT: CPT

## 2019-04-23 PROCEDURE — 6360000002 HC RX W HCPCS: Performed by: PHYSICIAN ASSISTANT

## 2019-04-23 PROCEDURE — 99232 SBSQ HOSP IP/OBS MODERATE 35: CPT | Performed by: INTERNAL MEDICINE

## 2019-04-23 PROCEDURE — 2580000003 HC RX 258: Performed by: INTERNAL MEDICINE

## 2019-04-23 PROCEDURE — 82550 ASSAY OF CK (CPK): CPT

## 2019-04-23 PROCEDURE — 84100 ASSAY OF PHOSPHORUS: CPT

## 2019-04-23 PROCEDURE — 94640 AIRWAY INHALATION TREATMENT: CPT

## 2019-04-23 PROCEDURE — 87070 CULTURE OTHR SPECIMN AEROBIC: CPT

## 2019-04-23 PROCEDURE — 94750 HC PULMONARY COMPLIANCE STUDY: CPT

## 2019-04-23 PROCEDURE — 2000000000 HC ICU R&B

## 2019-04-23 PROCEDURE — 99291 CRITICAL CARE FIRST HOUR: CPT | Performed by: INTERNAL MEDICINE

## 2019-04-23 PROCEDURE — 85025 COMPLETE CBC W/AUTO DIFF WBC: CPT

## 2019-04-23 PROCEDURE — 2580000003 HC RX 258: Performed by: PHYSICIAN ASSISTANT

## 2019-04-23 PROCEDURE — 80048 BASIC METABOLIC PNL TOTAL CA: CPT

## 2019-04-23 PROCEDURE — 84478 ASSAY OF TRIGLYCERIDES: CPT

## 2019-04-23 PROCEDURE — 83735 ASSAY OF MAGNESIUM: CPT

## 2019-04-23 RX ORDER — LINEZOLID 2 MG/ML
600 INJECTION, SOLUTION INTRAVENOUS EVERY 12 HOURS
Status: DISCONTINUED | OUTPATIENT
Start: 2019-04-23 | End: 2019-04-25

## 2019-04-23 RX ADMIN — LINEZOLID 600 MG: 600 INJECTION, SOLUTION INTRAVENOUS at 21:42

## 2019-04-23 RX ADMIN — Medication 2 PUFF: at 03:28

## 2019-04-23 RX ADMIN — Medication 2 PUFF: at 15:40

## 2019-04-23 RX ADMIN — FAMOTIDINE 20 MG: 20 TABLET, FILM COATED ORAL at 07:57

## 2019-04-23 RX ADMIN — Medication 2 PUFF: at 19:20

## 2019-04-23 RX ADMIN — POLYETHYLENE GLYCOL (3350) 17 G: 17 POWDER, FOR SOLUTION ORAL at 10:36

## 2019-04-23 RX ADMIN — DEXMEDETOMIDINE HYDROCHLORIDE 0.7 MCG/KG/HR: 100 INJECTION, SOLUTION INTRAVENOUS at 17:19

## 2019-04-23 RX ADMIN — VANCOMYCIN HYDROCHLORIDE 1250 MG: 10 INJECTION, POWDER, LYOPHILIZED, FOR SOLUTION INTRAVENOUS at 08:07

## 2019-04-23 RX ADMIN — Medication 2 PUFF: at 19:19

## 2019-04-23 RX ADMIN — LINEZOLID 600 MG: 600 INJECTION, SOLUTION INTRAVENOUS at 10:36

## 2019-04-23 RX ADMIN — Medication 2 PUFF: at 07:43

## 2019-04-23 RX ADMIN — Medication 10 ML: at 21:42

## 2019-04-23 RX ADMIN — MIDAZOLAM HYDROCHLORIDE 5 MG: 2 INJECTION, SOLUTION INTRAMUSCULAR; INTRAVENOUS at 00:51

## 2019-04-23 RX ADMIN — CHLORHEXIDINE GLUCONATE 0.12% ORAL RINSE 15 ML: 1.2 LIQUID ORAL at 10:36

## 2019-04-23 RX ADMIN — DIAZEPAM 10 MG: 10 TABLET ORAL at 21:42

## 2019-04-23 RX ADMIN — Medication 2 PUFF: at 23:24

## 2019-04-23 RX ADMIN — MEROPENEM 1 G: 1 INJECTION, POWDER, FOR SOLUTION INTRAVENOUS at 10:36

## 2019-04-23 RX ADMIN — FENTANYL CITRATE 50 MCG: 50 INJECTION INTRAMUSCULAR; INTRAVENOUS at 05:16

## 2019-04-23 RX ADMIN — Medication 2 PUFF: at 11:35

## 2019-04-23 RX ADMIN — DIAZEPAM 10 MG: 10 TABLET ORAL at 07:57

## 2019-04-23 RX ADMIN — FAMOTIDINE 20 MG: 20 TABLET, FILM COATED ORAL at 21:42

## 2019-04-23 RX ADMIN — MIDAZOLAM HYDROCHLORIDE 5 MG: 2 INJECTION, SOLUTION INTRAMUSCULAR; INTRAVENOUS at 10:35

## 2019-04-23 RX ADMIN — VANCOMYCIN HYDROCHLORIDE 1250 MG: 10 INJECTION, POWDER, LYOPHILIZED, FOR SOLUTION INTRAVENOUS at 00:47

## 2019-04-23 RX ADMIN — SENNOSIDES AND DOCUSATE SODIUM 2 TABLET: 8.6; 5 TABLET ORAL at 10:36

## 2019-04-23 RX ADMIN — CHLORHEXIDINE GLUCONATE 0.12% ORAL RINSE 15 ML: 1.2 LIQUID ORAL at 21:44

## 2019-04-23 RX ADMIN — ENOXAPARIN SODIUM 40 MG: 40 INJECTION SUBCUTANEOUS at 07:56

## 2019-04-23 RX ADMIN — MEROPENEM 1 G: 1 INJECTION, POWDER, FOR SOLUTION INTRAVENOUS at 18:39

## 2019-04-23 RX ADMIN — MIDAZOLAM HYDROCHLORIDE 5 MG: 2 INJECTION, SOLUTION INTRAMUSCULAR; INTRAVENOUS at 20:09

## 2019-04-23 RX ADMIN — MIDAZOLAM HYDROCHLORIDE 5 MG: 2 INJECTION, SOLUTION INTRAMUSCULAR; INTRAVENOUS at 08:39

## 2019-04-23 RX ADMIN — PROPOFOL 50 MCG/KG/MIN: 10 INJECTION, EMULSION INTRAVENOUS at 08:46

## 2019-04-23 RX ADMIN — Medication 2 PUFF: at 23:25

## 2019-04-23 RX ADMIN — MIDAZOLAM HYDROCHLORIDE 5 MG: 2 INJECTION, SOLUTION INTRAMUSCULAR; INTRAVENOUS at 03:25

## 2019-04-23 RX ADMIN — PROPOFOL 50 MCG/KG/MIN: 10 INJECTION, EMULSION INTRAVENOUS at 05:07

## 2019-04-23 RX ADMIN — FENTANYL CITRATE 50 MCG: 50 INJECTION INTRAMUSCULAR; INTRAVENOUS at 03:25

## 2019-04-23 RX ADMIN — DIAZEPAM 10 MG: 10 TABLET ORAL at 13:56

## 2019-04-23 RX ADMIN — PROPOFOL 30 MCG/KG/MIN: 10 INJECTION, EMULSION INTRAVENOUS at 17:18

## 2019-04-23 RX ADMIN — PROPOFOL 50 MCG/KG/MIN: 10 INJECTION, EMULSION INTRAVENOUS at 14:12

## 2019-04-23 RX ADMIN — FENTANYL CITRATE 75 MCG/HR: 50 INJECTION, SOLUTION INTRAMUSCULAR; INTRAVENOUS at 13:51

## 2019-04-23 RX ADMIN — Medication 2 PUFF: at 15:41

## 2019-04-23 RX ADMIN — MIDAZOLAM HYDROCHLORIDE 5 MG: 2 INJECTION, SOLUTION INTRAMUSCULAR; INTRAVENOUS at 13:02

## 2019-04-23 RX ADMIN — PROPOFOL 50 MCG/KG/MIN: 10 INJECTION, EMULSION INTRAVENOUS at 02:13

## 2019-04-23 RX ADMIN — CEFEPIME 2 G: 2 INJECTION, POWDER, FOR SOLUTION INTRAVENOUS at 04:21

## 2019-04-23 RX ADMIN — FENTANYL CITRATE 50 MCG: 50 INJECTION INTRAMUSCULAR; INTRAVENOUS at 08:57

## 2019-04-23 RX ADMIN — SENNOSIDES AND DOCUSATE SODIUM 2 TABLET: 8.6; 5 TABLET ORAL at 21:42

## 2019-04-23 ASSESSMENT — PULMONARY FUNCTION TESTS
PIF_VALUE: 23
PIF_VALUE: 25
PIF_VALUE: 23
PIF_VALUE: 21
PIF_VALUE: 23
PIF_VALUE: 20
PIF_VALUE: 19
PIF_VALUE: 20

## 2019-04-23 ASSESSMENT — PAIN SCALES - GENERAL
PAINLEVEL_OUTOF10: 5
PAINLEVEL_OUTOF10: 7
PAINLEVEL_OUTOF10: 7

## 2019-04-23 NOTE — FLOWSHEET NOTE
04/23/19 0833   Vitals   Pulse 141   Resp (!) 36   D: Pt became anxious and began to pull his legs up to his chest while on SAT/SBT. Heart rate in 140's he is breathing 34-35 times per minute. Respirations appear labored. RT called to bedside. Mechanical ventilation resumed, sedation resumed. Pt given 5 mg of versed PRN.  Mary Escobar

## 2019-04-23 NOTE — PROGRESS NOTES
04/23/19 1920   Vent Information   Vt Ordered 450 mL   Rate Set 14 bmp   Peak Flow 70 L/min   FiO2  30 %   PEEP/CPAP 5   Vent Patient Data   Peak Inspiratory Pressure 20 cmH2O   Mean Airway Pressure 9.5 cmH20   Rate Measured 30 br/min   Vt Exhaled 438 mL   Minute Volume 12.6 Liters   I:E Ratio 1:3.30   Plateau Pressure 17 [id number]   Static Compliance 37 mL/cmH2O   Dynamic Compliance 29 mL/cmH2O   Cough/Sputum   Cough Productive   Sputum Amount Small   Sputum Color Yellow   Tenacity Thick   Spontaneous Breathing Trial (SBT) RT Doc   Pulse 81   SpO2 97 %

## 2019-04-23 NOTE — PROGRESS NOTES
Pulmonary & Critical Care Medicine ICU Progress Note  CC:Acute respiratory failure with hypoxemia    Events of Last 24 hours: patient off versed and precedex gtt,  Tm101.9    Invasive Lines:   IV Line: IJ 4/16    MV:  4/16    Vent Mode: AC/VC Rate Set: 14 bmp/Vt Ordered: 450 mL/ /FiO2 : 30 %  Recent Labs     04/22/19  0520 04/23/19  0530   PHART 7.493* 7.513*   AEB4RNP 35.1 34.8*   PO2ART 119.5* 102.0       IV:   dexmedetomidine (PRECEDEX) IV infusion Stopped (04/22/19 1045)    dextrose      fentaNYL (SUBLIMAZE) infusion 75 mcg/hr (04/22/19 2204)    propofol 50 mcg/kg/min (04/23/19 0507)    midazolam Stopped (04/22/19 1818)       EXAM:  /88   Pulse 112   Temp 99.8 °F (37.7 °C) (Axillary)   Resp 28   Ht 5' 11\" (1.803 m)   Wt 191 lb 1.6 oz (86.7 kg)   SpO2 96%   BMI 26.65 kg/m²  on vent  Tmax:101.9F  CVP:      Intake/Output Summary (Last 24 hours) at 4/23/2019 0657  Last data filed at 4/23/2019 0548  Gross per 24 hour   Intake 4889 ml   Output 4975 ml   Net -86 ml     General: + distress. NCAT. Eyes: PERRL. No sclera icterus. No conjunctival injection. ENT: No discharge. Pharynx clear. Neck: Trachea midline. Normal thyroid. Resp: No accessory muscle use. No  crackles. No wheezing. bilateral rhonchi. No dullness on percussion. CV: tachy rate. Regular rhythm. No mumur or rub. No edema. GI: Non-tender. Non-distended. No masses. Skin: Warm and dry. No nodule on exposed extremities. Vesicular rash on L back below shoulder blade  Lymph: No cervical LAD. No supraclavicular LAD. M/S: No cyanosis. No joint deformity. No clubbing.    Neuro: awake and alert, + moving extremities, + pupillary response, follows simple commands    Medications:   famotidine  20 mg Oral BID    polyethylene glycol  17 g Oral Daily    vancomycin  1,250 mg Intravenous Q8H    ipratropium  2 puff Inhalation Q4H    albuterol sulfate HFA  2 puff Inhalation Q4H    cefepime  2 g Intravenous Q8H    chlorhexidine  15 mL Mouth/Throat BID    fentanNYL  50 mcg Intravenous Once    midazolam  2 mg Intravenous Once    diazepam  10 mg Oral TID    sennosides-docusate sodium  2 tablet Oral BID    folic acid, thiamine, multi-vitamin with vitamin K infusion   Intravenous Once    sodium chloride flush  10 mL Intravenous 2 times per day    enoxaparin  40 mg Subcutaneous Daily     PRN Meds:  midazolam, carboxymethylcellulose PF, fentanNYL, glucose, dextrose, glucagon (rDNA), dextrose, medicated lip balm, sodium chloride flush, magnesium hydroxide, ondansetron, potassium chloride **OR** potassium alternative oral replacement **OR** potassium chloride, magnesium sulfate, acetaminophen, [] traMADol **AND** cloNIDine    Results:  CBC:   Recent Labs     19  0600 19  0515 19  0530   WBC 8.4 7.9 7.4   HGB 9.5* 9.9* 10.4*   HCT 28.0* 28.3* 29.9*   MCV 94.3 92.9 92.4    236 286     BMP:   Recent Labs     19  0600 19  0515 19  0530   * 139 143   K 3.8 3.5 3.5    106 106   CO2 27 26 27   PHOS 2.1* 2.5 3.2   BUN 11 9 10   CREATININE 0.9 0.7* 0.6*     LIVER PROFILE: No results for input(s): AST, ALT, LIPASE, BILIDIR, BILITOT, ALKPHOS in the last 72 hours. Invalid input(s): AMYLASE,  ALB  PT/INR: No results for input(s): PROTIME, INR in the last 72 hours. APTT: No results for input(s): APTT in the last 72 hours. UA:No results for input(s): NITRITE, COLORU, PHUR, LABCAST, WBCUA, RBCUA, MUCUS, TRICHOMONAS, YEAST, BACTERIA, CLARITYU, SPECGRAV, LEUKOCYTESUR, UROBILINOGEN, BILIRUBINUR, BLOODU, GLUCOSEU, AMORPHOUS in the last 72 hours.     Invalid input(s): Amanda Jaren    Cultures:  Bld: +CNS  BAL -  H influenzae    Films:  CXR : hazy LLL airspace opacities, ETT in place    Assessment:  · Acute respiratory failure with hypoxemia  · Acute encephalopathy  · Agitation  · polysubstance abuse  · electrolyte disturbance  · Perisistent fever- possible LLL HCAP  · Blistering skin reaction on Back - drug reaction ? shingles  · persistent fever- unclear source, ? Drug fever      Plan:  · Mechanical ventilation per my orders. The ventilator was adjusted by me at the bedside for unstable, life threatening respiratory failure. Wean oxygen as tolerated. · Iv sedation - restart precedex, fentanyl 75; propofol 50  · Daily sat/sbt  · Valium 10 mg tid  · seroquel 50 bid- hold for now ? Serotonin syndrome with Rembrandt wort. · Tube feeds at goal  · Repeat cultures  · Contact precautions  · Abx: D4 vanc and cefepime- change to zyvox and merrem   · ICU care- lovenox and bactroban, pepcid  · D/w mother in ICU    Critical care time spent reviewing labs/films, examining patient, collaborating with other physicians but excluding procedures for life threatening organ failure is 32 minutes.

## 2019-04-23 NOTE — FLOWSHEET NOTE
04/22/19 2000   Vital Signs   Temp 101.5 °F (38.6 °C)   Temp Source Axillary   Pulse 125   Resp 29   /84   BP Location Left upper arm   BP Upper/Lower Upper   MAP (mmHg) 100   Patient Position Semi fowlers   Level of Consciousness 2   MEWS Score 8   Pain Assessment   Response to Pain Intervention Asleep with RR greater than 10   Oxygen Therapy   SpO2 95 %   O2 Device Ventilator   FiO2  30 %       Shift assessment completed the patient resting comfortably. See flow sheets. VSS Mechanically ventilated. Settings:14/450/30/5. Sedation Fentanyl @75  mcg/min, Propofol  50 mcg/kg/min. Temp 101.5 gave Tylenol 650 mgSuctioning frequently PRN, mouth care. Pt coughing alarming vent. Administered Versed 5 mg ivp. L TLC IJ. All ports patent and intact  Ontiveros catheter draing clear yellow urines. No signs of distress noted. Will continue to monitor.

## 2019-04-23 NOTE — PROGRESS NOTES
Reassessment complete. See flow sheet. Versed 5 mg IVP given, Pt coughing and pulling at restraints. But following commands when redirection is given. Propofol @ 50 mcg/kg/min. Fentanyl 75 mcg/hr. No other signs of distress noted. Will continue to monitor.

## 2019-04-23 NOTE — PROGRESS NOTES
Progress Note    Admit Date:  4/15/2019    Admitted with agitation, acute resp failure     Per pt's mother , he has never acted this way in the past. His father is bipolar and his mother reports that the patient has gone through rehab before but has never seen a psychiatrist before, but is very concerned with his behaviors stating that he had been sober for 8 months and doing well. Fevers overnight  Failed SBT today  Back on precedex    Subjective:  Mr. Mick Patel was seen awake on vent, following commands        Objective:   /67   Pulse 66   Temp 99.5 °F (37.5 °C)   Resp 20   Ht 5' 11\" (1.803 m)   Wt 191 lb 1.6 oz (86.7 kg)   SpO2 98%   BMI 26.65 kg/m²       Intake/Output Summary (Last 24 hours) at 4/23/2019 1628  Last data filed at 4/23/2019 0548  Gross per 24 hour   Intake 1527 ml   Output 2425 ml   Net -898 ml         Physical Exam:  General:  Intubated, awake on vent, following commands  Oral ETT and OG noted   Skin:  Warm and dry  Neck:  JVD absent. Neck supple  Chest:  Clear to auscultation, respiration easy. No wheezes, rales or rhonchi. Cardiovascular:  RRR ,S1S2 normal  Abdomen:  Soft, non tender, non distended, BS +  Extremities: patchy edematous skin with peeling on left scapular region   No shingles rash noted     Intact peripheral pulses.  Brisk cap refill, < 2 secs  Neuro: sedated      Medications:   Scheduled Meds:   linezolid  600 mg Intravenous Q12H    meropenem  1 g Intravenous Q8H    famotidine  20 mg Oral BID    polyethylene glycol  17 g Oral Daily    ipratropium  2 puff Inhalation Q4H    albuterol sulfate HFA  2 puff Inhalation Q4H    chlorhexidine  15 mL Mouth/Throat BID    fentanNYL  50 mcg Intravenous Once    midazolam  2 mg Intravenous Once    diazepam  10 mg Oral TID    sennosides-docusate sodium  2 tablet Oral BID    folic acid, thiamine, multi-vitamin with vitamin K infusion   Intravenous Once    sodium chloride flush  10 mL Intravenous 2 times per day    enoxaparin  40 mg Subcutaneous Daily       Continuous Infusions:   dexmedetomidine (PRECEDEX) IV infusion Stopped (04/22/19 1045)    dextrose      fentaNYL (SUBLIMAZE) infusion 75 mcg/hr (04/23/19 1351)    propofol 50 mcg/kg/min (04/23/19 1412)    midazolam Stopped (04/22/19 1818)       Data:  CBC:   Recent Labs     04/21/19  0600 04/22/19  0515 04/23/19  0530   WBC 8.4 7.9 7.4   RBC 2.97* 3.05* 3.23*   HGB 9.5* 9.9* 10.4*   HCT 28.0* 28.3* 29.9*   MCV 94.3 92.9 92.4   RDW 13.7 14.1 14.4    236 286     BMP:   Recent Labs     04/21/19  0600 04/22/19  0515 04/23/19  0530   * 139 143   K 3.8 3.5 3.5    106 106   CO2 27 26 27   PHOS 2.1* 2.5 3.2   BUN 11 9 10   CREATININE 0.9 0.7* 0.6*     BNP: No results for input(s): BNP in the last 72 hours. PT/INR: No results for input(s): PROTIME, INR in the last 72 hours. APTT: No results for input(s): APTT in the last 72 hours. CARDIAC ENZYMES: No results for input(s): CKMB, CKMBINDEX, TROPONINI in the last 72 hours. Invalid input(s): CKTOTAL;3  FASTING LIPID PANEL:  Lab Results   Component Value Date    TRIG 548 (H) 04/23/2019     LIVER PROFILE:   No results for input(s): AST, ALT, ALB, BILIDIR, BILITOT, ALKPHOS in the last 72 hours. XR CHEST PORTABLE   Preliminary Result   Findings consistent with improvement but incomplete resolution of left   basilar pleural parenchymal disease when compared to the study 04/22/2019 as   described above. XR CHEST PORTABLE   Final Result   Similar left basilar atelectasis and small pleural effusion. Mild improved   aeration the right mid lung. Otherwise stable chest.         XR CHEST PORTABLE   Final Result   Stable lines and tubes. Left pleural effusion with left basilar atelectasis, stable. XR CHEST PORTABLE   Final Result   Basilar airspace disease, atelectasis or pneumonia.          XR CHEST PORTABLE   Final Result   Subtle left streaky basilar opacity which may represent atelectasis or   developing infection. XR CHEST PORTABLE   Final Result   No evidence for acute cardiopulmonary process. XR ABDOMEN (KUB) (SINGLE AP VIEW)   Final Result   No foreign body is identified. Normal bowel gas pattern. XR CHEST PORTABLE   Final Result   Low volume study with vascular crowding versus mild edema, similar to prior. XR CHEST PORTABLE   Final Result   New life support apparatus as above. Mild edema. No pneumothorax. XR CHEST STANDARD (2 VW)   Final Result   No acute cardiopulmonary process. CT Head WO Contrast   Final Result   No acute intracranial abnormality or significant interval change from prior   study 10/01/2016. XR CHEST PORTABLE    (Results Pending)         Assessment:  Active Problems:    History of mixed drug abuse    Acute encephalopathy    Psychosis (HCC)    Agitation    Polysubstance abuse (HCC)    Acute respiratory failure (HCC)    Paranoia (HCC)    Combative behavior    Disorder of electrolytes    Sinus tachycardia    Altered mental status    Pneumonia of left lower lobe due to Pseudomonas species ([facility] [address]  Resolved Problems:    * No resolved hospital problems. *      Plan:    Acute encephalopathy  - suspect related to substance abuse based on patient's history, yet etiology is not entirely clear  - UDS+ THC only - family reports pt being sober for 8 months   - head CT without acute findings  - initially admitted to medical floor but became severely agitated, code violet called, and transferred to ICU  - Multiple sedating meds administered . -  on propofol, versed and fentanyl gtt  - Valium TID, precedex gtt started 4/18  - cont PRN ativan , scheduled valium  - intensivist c/s  - ongoing weaning trials     Acute resp failure  - intubated for airway protection  - pt requiring heavy sedation - ongoing weaning      Acute febrile illness  - likely aspiration PNA  - increased resp secretions  - s/p bronch  .  Check cultures  - started on vanc and cefepime  H. Influenza in resp cultures   - still with fevers    Polysubstance abuse  - UDS + THC only today  - based on EMR- patient with prior fentanyl abuse      Hypokalemia  - replaced monitor BMP       DVT Prophylaxis: Lovenox   DIET TUBE FEED CONTINUOUS/CYCLIC NPO; 1.5 Calorie with Fiber (Jevity 1.5);  Orogastric; Continuous; 20; 40; 20  Dietary Nutrition Supplements: Protein Modular  Code Status: Full Code    Updated mother    Lexi Ngo MD  4/23/2019 4:28 PM

## 2019-04-23 NOTE — PROGRESS NOTES
RESPIRATORY THERAPY ASSESSMENT    Name:  [facility] Record Number:  [id number]  Age: 45 y.o. Gender: male  : 1980  Today's Date:  2019  Room:  [facility]3012-01    Assessment     Is the patient being admitted for a COPD or Asthma exacerbation? No   (If yes the patient will be seen every 4 hours for the first 24 hours and then reassessed)    Patient Admission Diagnosis      Allergies  No Known Allergies    Minimum Predicted Vital Capacity:     On vent          Actual Vital Capacity:      On vent              Pulmonary History:No history  Home Oxygen Therapy:  room air  Home Respiratory Therapy:None   Current Respiratory Therapy:  Albuterol/Atrovent Q4H  Treatment Type: MDI  Medications: Ipratropium Bromide    Respiratory Severity Index(RSI)   Patients with orders for inhalation medications, oxygen, or any therapeutic treatment modality will be placed on Respiratory Protocol. They will be assessed with the first treatment and at least every 72 hours thereafter. The following severity scale will be used to determine frequency of treatment intervention.     Smoking History: Smoking History Less than 1ppd or less than 15 pack year = 1    Social History  Social History     Tobacco Use    Smoking status: Current Some Day Smoker     Packs/day: 0.50     Years: 3.00     Pack years: 1.50     Types: Cigarettes    Smokeless tobacco: Never Used   Substance Use Topics    Alcohol use: No    Drug use: Yes     Frequency: 3.0 times per week     Types: Marijuana, Opiates , Methamphetamines     Comment: Heroin, Fentanyl       Recent Surgical History: None = 0  Past Surgical History  Past Surgical History:   Procedure Laterality Date    BACK SURGERY      KNEE SURGERY      right knee       Level of Consciousness: Comatose = 4    Level of Activity: Bedridden, unresponsive or quadriplegic = 4    Respiratory Pattern: Increased; RR 21-30 = 1    Breath Sounds: Diminshed bilaterally and/or crackles = are met  a. Incognizant or uncooperative          b. Patients treated with HHN at Home        c. Unable to demonstrate proper use of MDI with spacer     d. RR > 30 bpm   5. Bronchodilators will be delivered via Metered Dose Inhaler (MDI), HHN, Aerogen to intubated patients on mechanical ventilation. 6. Inhalation medication orders will be delivered and/or substituted as outlined below. Aerosolized Medications Ordering and Administration Guidelines:    1. All Medications will be ordered by a physician, and their frequency and/or modality will be adjusted as defined by the patients Respiratory Severity Index (RSI) score. 2. If the patient does not have documented COPD, consider discontinuing anticholinergics when RSI is less than 9.  3. If the bronchospasm worsens (increased RSI), then the bronchodilator frequency can be increased to a maximum of every 4 hours. If greater than every 4 hours is required, the physician will be contacted. 4. If the bronchospasm improves, the frequency of the bronchodilator can be decreased, based on the patient's RSI, but not less than home treatment regimen frequency. 5. Bronchodilator(s) will be discontinued if patient has a RSI less than 9 and has received no scheduled or as needed treatment for 72  Hrs. Patients Ordered on a Mucolytic Agent:    1. Must always be administered with a bronchodilator. 2. Discontinue if patient experiences worsened bronchospasm, or secretions have lessened to the point that the patient is able to clear them with a cough. Anti-inflammatory and Combination Medications:    1. If the patient lacks prior history of lung disease, is not using inhaled anti-inflammatory medication at home, and lacks wheezing by examination or by history for at least 24 hours, contact physician for possible discontinuation.

## 2019-04-24 ENCOUNTER — APPOINTMENT (OUTPATIENT)
Dept: GENERAL RADIOLOGY | Age: 39
DRG: 052 | End: 2019-04-24
Payer: COMMERCIAL

## 2019-04-24 LAB
ANION GAP SERPL CALCULATED.3IONS-SCNC: 10 MMOL/L (ref 3–16)
BASE EXCESS ARTERIAL: 5.2 MMOL/L (ref -3–3)
BASOPHILS ABSOLUTE: 0 K/UL (ref 0–0.2)
BASOPHILS RELATIVE PERCENT: 0.6 %
BUN BLDV-MCNC: 14 MG/DL (ref 7–20)
CALCIUM SERPL-MCNC: 9.1 MG/DL (ref 8.3–10.6)
CARBOXYHEMOGLOBIN ARTERIAL: 0.4 % (ref 0–1.5)
CHLORIDE BLD-SCNC: 104 MMOL/L (ref 99–110)
CO2: 27 MMOL/L (ref 21–32)
CREAT SERPL-MCNC: 0.6 MG/DL (ref 0.9–1.3)
CULTURE, RESPIRATORY: NORMAL
EOSINOPHILS ABSOLUTE: 0.5 K/UL (ref 0–0.6)
EOSINOPHILS RELATIVE PERCENT: 6.7 %
GFR AFRICAN AMERICAN: >60
GFR NON-AFRICAN AMERICAN: >60
GLUCOSE BLD-MCNC: 114 MG/DL (ref 70–99)
GLUCOSE BLD-MCNC: 146 MG/DL (ref 70–99)
GLUCOSE BLD-MCNC: 157 MG/DL (ref 70–99)
GRAM STAIN RESULT: NORMAL
HCO3 ARTERIAL: 27.9 MMOL/L (ref 21–29)
HCT VFR BLD CALC: 31.6 % (ref 40.5–52.5)
HEMOGLOBIN, ART, EXTENDED: 10.9 G/DL (ref 13.5–17.5)
HEMOGLOBIN: 10.7 G/DL (ref 13.5–17.5)
LYMPHOCYTES ABSOLUTE: 1.1 K/UL (ref 1–5.1)
LYMPHOCYTES RELATIVE PERCENT: 14.5 %
MAGNESIUM: 2.1 MG/DL (ref 1.8–2.4)
MCH RBC QN AUTO: 30.6 PG (ref 26–34)
MCHC RBC AUTO-ENTMCNC: 33.8 G/DL (ref 31–36)
MCV RBC AUTO: 90.6 FL (ref 80–100)
METHEMOGLOBIN ARTERIAL: 0 %
MONOCYTES ABSOLUTE: 0.7 K/UL (ref 0–1.3)
MONOCYTES RELATIVE PERCENT: 9 %
NEUTROPHILS ABSOLUTE: 5.1 K/UL (ref 1.7–7.7)
NEUTROPHILS RELATIVE PERCENT: 69.2 %
O2 CONTENT ARTERIAL: 15 ML/DL
O2 SAT, ARTERIAL: 98.5 %
O2 THERAPY: ABNORMAL
PCO2 ARTERIAL: 34.2 MMHG (ref 35–45)
PDW BLD-RTO: 14.3 % (ref 12.4–15.4)
PERFORMED ON: ABNORMAL
PERFORMED ON: ABNORMAL
PH ARTERIAL: 7.53 (ref 7.35–7.45)
PHOSPHORUS: 3.5 MG/DL (ref 2.5–4.9)
PLATELET # BLD: 365 K/UL (ref 135–450)
PMV BLD AUTO: 6.6 FL (ref 5–10.5)
PO2 ARTERIAL: 112.5 MMHG (ref 75–108)
POTASSIUM SERPL-SCNC: 4.1 MMOL/L (ref 3.5–5.1)
RBC # BLD: 3.49 M/UL (ref 4.2–5.9)
SODIUM BLD-SCNC: 141 MMOL/L (ref 136–145)
TCO2 ARTERIAL: 29 MMOL/L
TOTAL CK: 106 U/L (ref 39–308)
URINE CULTURE, ROUTINE: NORMAL
WBC # BLD: 7.4 K/UL (ref 4–11)

## 2019-04-24 PROCEDURE — 2580000003 HC RX 258: Performed by: INTERNAL MEDICINE

## 2019-04-24 PROCEDURE — 6360000002 HC RX W HCPCS: Performed by: INTERNAL MEDICINE

## 2019-04-24 PROCEDURE — 99232 SBSQ HOSP IP/OBS MODERATE 35: CPT | Performed by: INTERNAL MEDICINE

## 2019-04-24 PROCEDURE — 92526 ORAL FUNCTION THERAPY: CPT

## 2019-04-24 PROCEDURE — 82550 ASSAY OF CK (CPK): CPT

## 2019-04-24 PROCEDURE — 80048 BASIC METABOLIC PNL TOTAL CA: CPT

## 2019-04-24 PROCEDURE — 2580000003 HC RX 258: Performed by: PHYSICIAN ASSISTANT

## 2019-04-24 PROCEDURE — 83735 ASSAY OF MAGNESIUM: CPT

## 2019-04-24 PROCEDURE — 6370000000 HC RX 637 (ALT 250 FOR IP): Performed by: INTERNAL MEDICINE

## 2019-04-24 PROCEDURE — 99291 CRITICAL CARE FIRST HOUR: CPT | Performed by: INTERNAL MEDICINE

## 2019-04-24 PROCEDURE — 2000000000 HC ICU R&B

## 2019-04-24 PROCEDURE — 2500000003 HC RX 250 WO HCPCS: Performed by: INTERNAL MEDICINE

## 2019-04-24 PROCEDURE — 6370000000 HC RX 637 (ALT 250 FOR IP): Performed by: PHYSICIAN ASSISTANT

## 2019-04-24 PROCEDURE — 84100 ASSAY OF PHOSPHORUS: CPT

## 2019-04-24 PROCEDURE — 85025 COMPLETE CBC W/AUTO DIFF WBC: CPT

## 2019-04-24 PROCEDURE — 2700000000 HC OXYGEN THERAPY PER DAY

## 2019-04-24 PROCEDURE — 94003 VENT MGMT INPAT SUBQ DAY: CPT

## 2019-04-24 PROCEDURE — 94761 N-INVAS EAR/PLS OXIMETRY MLT: CPT

## 2019-04-24 PROCEDURE — 82803 BLOOD GASES ANY COMBINATION: CPT

## 2019-04-24 PROCEDURE — 71045 X-RAY EXAM CHEST 1 VIEW: CPT

## 2019-04-24 PROCEDURE — 36415 COLL VENOUS BLD VENIPUNCTURE: CPT

## 2019-04-24 PROCEDURE — 94640 AIRWAY INHALATION TREATMENT: CPT

## 2019-04-24 PROCEDURE — 92610 EVALUATE SWALLOWING FUNCTION: CPT

## 2019-04-24 PROCEDURE — 6360000002 HC RX W HCPCS: Performed by: PHYSICIAN ASSISTANT

## 2019-04-24 RX ORDER — IPRATROPIUM BROMIDE AND ALBUTEROL SULFATE 2.5; .5 MG/3ML; MG/3ML
1 SOLUTION RESPIRATORY (INHALATION) EVERY 4 HOURS PRN
Status: DISCONTINUED | OUTPATIENT
Start: 2019-04-24 | End: 2019-04-27 | Stop reason: HOSPADM

## 2019-04-24 RX ADMIN — MIDAZOLAM HYDROCHLORIDE 5 MG: 2 INJECTION, SOLUTION INTRAMUSCULAR; INTRAVENOUS at 00:16

## 2019-04-24 RX ADMIN — ACETAMINOPHEN 650 MG: 325 TABLET ORAL at 23:42

## 2019-04-24 RX ADMIN — MEROPENEM 1 G: 1 INJECTION, POWDER, FOR SOLUTION INTRAVENOUS at 17:43

## 2019-04-24 RX ADMIN — Medication 10 ML: at 09:00

## 2019-04-24 RX ADMIN — MEROPENEM 1 G: 1 INJECTION, POWDER, FOR SOLUTION INTRAVENOUS at 01:35

## 2019-04-24 RX ADMIN — FENTANYL CITRATE 50 MCG: 50 INJECTION INTRAMUSCULAR; INTRAVENOUS at 02:28

## 2019-04-24 RX ADMIN — DEXMEDETOMIDINE HYDROCHLORIDE 0.9 MCG/KG/HR: 100 INJECTION, SOLUTION INTRAVENOUS at 02:07

## 2019-04-24 RX ADMIN — Medication 2 PUFF: at 02:41

## 2019-04-24 RX ADMIN — PROPOFOL 35 MCG/KG/MIN: 10 INJECTION, EMULSION INTRAVENOUS at 05:09

## 2019-04-24 RX ADMIN — MIDAZOLAM HYDROCHLORIDE 5 MG: 2 INJECTION, SOLUTION INTRAMUSCULAR; INTRAVENOUS at 02:00

## 2019-04-24 RX ADMIN — Medication 10 ML: at 21:18

## 2019-04-24 RX ADMIN — MIDAZOLAM HYDROCHLORIDE 5 MG: 2 INJECTION, SOLUTION INTRAMUSCULAR; INTRAVENOUS at 04:17

## 2019-04-24 RX ADMIN — FENTANYL CITRATE 75 MCG/HR: 50 INJECTION, SOLUTION INTRAMUSCULAR; INTRAVENOUS at 02:13

## 2019-04-24 RX ADMIN — CARBOXYMETHYLCELLULOSE SODIUM 8 DROP: 10 GEL OPHTHALMIC at 17:17

## 2019-04-24 RX ADMIN — ENOXAPARIN SODIUM 40 MG: 40 INJECTION SUBCUTANEOUS at 08:59

## 2019-04-24 RX ADMIN — CHLORHEXIDINE GLUCONATE 0.12% ORAL RINSE 15 ML: 1.2 LIQUID ORAL at 21:17

## 2019-04-24 RX ADMIN — DEXMEDETOMIDINE HYDROCHLORIDE 1 MCG/KG/HR: 100 INJECTION, SOLUTION INTRAVENOUS at 06:54

## 2019-04-24 RX ADMIN — LINEZOLID 600 MG: 600 INJECTION, SOLUTION INTRAVENOUS at 08:59

## 2019-04-24 RX ADMIN — FAMOTIDINE 20 MG: 20 TABLET, FILM COATED ORAL at 21:17

## 2019-04-24 RX ADMIN — LINEZOLID 600 MG: 600 INJECTION, SOLUTION INTRAVENOUS at 21:18

## 2019-04-24 RX ADMIN — MEROPENEM 1 G: 1 INJECTION, POWDER, FOR SOLUTION INTRAVENOUS at 08:59

## 2019-04-24 ASSESSMENT — PULMONARY FUNCTION TESTS
PIF_VALUE: 37
PIF_VALUE: 40
PIF_VALUE: 22
PIF_VALUE: 37
PIF_VALUE: 22
PIF_VALUE: 38

## 2019-04-24 ASSESSMENT — PAIN SCALES - GENERAL
PAINLEVEL_OUTOF10: 0
PAINLEVEL_OUTOF10: 8
PAINLEVEL_OUTOF10: 0
PAINLEVEL_OUTOF10: 0

## 2019-04-24 ASSESSMENT — PAIN DESCRIPTION - PAIN TYPE: TYPE: ACUTE PAIN

## 2019-04-24 ASSESSMENT — PAIN DESCRIPTION - DESCRIPTORS: DESCRIPTORS: ACHING

## 2019-04-24 ASSESSMENT — PAIN DESCRIPTION - LOCATION: LOCATION: HEAD

## 2019-04-24 NOTE — PROGRESS NOTES
Report given to Chuckie Patricio RN at bedside for transfer of care. Pt denies needs at this time, call light within reach.    Harika Olguin RN

## 2019-04-24 NOTE — PROGRESS NOTES
A: Assessment completed and documented, discussed plan of care with patient's mother who agrees, patient is awake and is indicating that he wants the ET tube out. Explained to patient that he will be trial led in the morning and that if he does well, that the tube would be taken out, medicated for restlessness, see MAR.
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
## 2019-04-24 NOTE — PROGRESS NOTES
Pulmonary & Critical Care Medicine ICU Progress Note  CC:Acute respiratory failure with hypoxemia    Events of Last 24 hours: patient now afebrile. He self-extubated this am.      Invasive Lines:   IV Line: IJ 4/16    MV:  4/16    Vent Mode: AC/VC Rate Set: 14 bmp/Vt Ordered: 450 mL/ /FiO2 : 30 %  Recent Labs     04/23/19  0530 04/24/19  0459   PHART 7.513* 7.530*   EQQ6IGH 34.8* 34.2*   PO2ART 102.0 112.5*       IV:   dexmedetomidine (PRECEDEX) IV infusion 1 mcg/kg/hr (04/24/19 0654)    dextrose      fentaNYL (SUBLIMAZE) infusion 75 mcg/hr (04/24/19 0213)    propofol 35 mcg/kg/min (04/24/19 0509)    midazolam Stopped (04/22/19 1818)       EXAM:  BP (!) 148/89   Pulse 75   Temp 98.3 °F (36.8 °C) (Oral)   Resp 13   Ht 5' 11\" (1.803 m)   Wt 192 lb (87.1 kg)   SpO2 97%   BMI 26.78 kg/m²  on vent  Tmax: 99.4F  CVP:      Intake/Output Summary (Last 24 hours) at 4/24/2019 0723  Last data filed at 4/24/2019 0600  Gross per 24 hour   Intake 2832.16 ml   Output 3950 ml   Net -1117.84 ml     General:No distress. NCAT. Eyes: PERRL. No sclera icterus. No conjunctival injection. ENT: No discharge. Pharynx clear. Neck: Trachea midline. Normal thyroid. Resp: No accessory muscle use. No  crackles. No wheezing. Bilateral few  rhonchi. No dullness on percussion. CV: tachy rate. Regular rhythm. No mumur or rub. No edema. GI: Non-tender. Non-distended. No masses. Skin: Warm and dry. No nodule on exposed extremities. Lymph: No cervical LAD. No supraclavicular LAD. M/S: No cyanosis. No joint deformity. No clubbing.    Neuro: awake and alert, + moving extremities, + pupillary response, follows simple commands    Medications:   linezolid  600 mg Intravenous Q12H    meropenem  1 g Intravenous Q8H    famotidine  20 mg Oral BID    polyethylene glycol  17 g Oral Daily    ipratropium  2 puff Inhalation Q4H    albuterol sulfate HFA  2 puff Inhalation Q4H    chlorhexidine  15 mL Mouth/Throat BID    fentanNYL shingles  · persistent fever- unclear source, ? Drug fever- better today      Plan:  · Supplemental oxygen to maintain SaO2 >92%; wean as tolerated   · Precedex IV as needed for agitation  · Valium 10 mg tid  · seroquel 50 bid- hold for now ? Serotonin syndrome with [place] wort. · Tube feeds at goal  · Abx: D2 zyvox and merrem; had 4 days of vanc and cefepime  · ICU care- lovenox and bactroban, pepcid      Critical care time spent reviewing labs/films, examining patient, collaborating with other physicians but excluding procedures for life threatening organ failure is 32 minutes.

## 2019-04-24 NOTE — PROGRESS NOTES
Nutrition Assessment    Type and Reason for Visit: Reassess    Nutrition Recommendations:   1. Continue NPO status until patient is evaluated by SLP and cleared for nutrition progression with appropriate/safe consistencies for po intake. 2. Monitor SLP notes, nutrition progression, appetite, and po intake. 3. Monitor mental status. 4. Monitor for additional in-patient weight changes. 5. Monitor nutrition-related labs and bowel function/regimen. Nutrition Assessment: patient's nutritional status was stable until this am when he self-extubated himself (and OG was pulled out too) so he is currently not receiving nutrition therapy (EN or po) and he remains at risk for further compromise d/t c/o sore throat after self-extubation, NPO status, and need for swallow evaluation prior to initiation of po diet order; will continue NPO status and monitor SLP notes + nutrition progression    Malnutrition Assessment:  · Malnutrition Status: At risk for malnutrition  · Context: Acute illness or injury  · Findings of the 6 clinical characteristics of malnutrition (Minimum of 2 out of 6 clinical characteristics is required to make the diagnosis of moderate or severe Protein Calorie Malnutrition based on AND/ASPEN Guidelines):  1. Energy Intake-(> 75% of estimated nutrition needs (EN)), (x 4-5 days admission)    2. Weight Loss-No significant weight loss, in 1 week  3. Fat Loss-No significant subcutaneous fat loss,    4. Muscle Loss-No significant muscle mass loss,    5. Fluid Accumulation-No significant fluid accumulation,    6.  Strength-Not measured    Nutrition Risk Level:  Moderate    Nutrient Needs:  · Estimated Daily Total Kcal: 1720 - 2150 kcals based on 20-25 kcals/kg/CBW  · Estimated Daily Protein (g): 94 - 109 g protein based on 1.2-1.4 g/kg/IBW  · Estimated Daily Total Fluid (ml/day): 1700 - 2100 ml     Nutrition Diagnosis:   · Problem: Inadequate oral intake  · Etiology: related to Insufficient 4/24/19 at 11:28 AM    Contact Number: [phone]

## 2019-04-24 NOTE — PROGRESS NOTES
Pt continues to sat 100% on 2L O2. Attempted to remove O2, pt sat mainitained at 100% on room air but became increasing anxious and asked that he keep the O2 on. Attempted education but pt refused. O2 placed back on pt for comfort. Will continue to monitor.   Bruna Riedel Pride RN

## 2019-04-24 NOTE — PROGRESS NOTES
D: Vent was alarming. A: Went to check of patient and he had leaned over while in restraints and managed to pull out his ET tube with the cuff still inflated, suctioned patient and stopped all sedation as well as the tube feeding since the patient had removed the OG as well. Placed nasal canula and turned the oxygen to 2 liters, heart rate is 78, respirations are even and easily, patient is able to cough up secretions and is using  A suction catheter. Patient states that he knows that he is in the hospital and that the doctors were taking too long to remove the ET tube so he did it himself. Oxygen saturations is in the upper 90's.

## 2019-04-24 NOTE — PROGRESS NOTES
Speech Language Pathology  Facility/Department: [facility] ICU   BEDSIDE SWALLOW EVALUATION      Recommended Diet and Intervention  Diet Solids Recommendation: Dysphagia I Pureed  Liquid Consistency Recommendation: Nectar(No straws)  Recommended Form of Meds: Meds in puree  *Recommend adherence to strict aspiration precautions; Pt is considered at a higher risk of aspiration d/t agitation, length of intubation, and self-extubation. *If pt has overt s/s of aspiration with PO intake or worsening respiratory status, recommend downgrade to strict NPO pending re-assessment by ST        NAME: Arsenio Lopez  : 1980  MRN: [id number]    ADMISSION DATE: 4/15/2019  ADMITTING DIAGNOSIS: has History of mixed drug abuse; Acute encephalopathy; Psychosis ([address] Agitation; Polysubstance abuse ([facility] [address] Acute respiratory failure ([facility] [address] Paranoia ([facility] [address] Combative behavior; Disorder of electrolytes; Sinus tachycardia; Altered mental status; and Pneumonia of left lower lobe due to Pseudomonas species [facility]) on their problem list.  ONSET DATE: Pt admitted to [facility] on 19    Recent Chest Xray/CT of Chest (19): FINDINGS:   Endotracheal tube with tip 1.7 cm from the aftab.  Left IJ catheter with tip   projecting in the region of the mid SVC.  No pneumothorax.  No pleural   effusion.  Pulmonary vascular congestion and scattered hazy airspace   opacities throughout the lungs.  More prominent opacity in the left lung base   has improved somewhat. Date of Eval: 2019  Evaluating Therapist: Donaldo Lynch    Current Diet level:  Current Diet : NPO(Pending BSE)  Current Liquid Diet : NPO(Pending BSE)    Primary Complaint  Patient Complaint: Pt eager to eat / drink.     Pain:  Pain Assessment: No c/o pain during BSE    Reason for Referral  Arsenio Lopez was referred for a bedside swallow evaluation to assess the efficiency of his swallow function, identify signs and symptoms of aspiration and make recommendations regarding safe dietary consistencies, effective compensatory strategies, and safe eating environment. Impression  Dysphagia Diagnosis: Mild oral stage dysphagia;Mild pharyngeal stage dysphagia; Suspected needs further assessment  Dysphagia Impression : Pt is considered at a higher risk of aspiration d/t agitation, length of intubation, and self-extubation. Dysphagia Outcome Severity Scale: Level 4: Mild moderate dysphagia- Intermittent supervision/cueing. One - two diet consistencies restricted   Pt seen upright in bed, alert and agreeable to evaluation, but confused (oriented to self and place only). RN OK'd SLP entry and evaluation. Pt admitted with AMS, agitation, and paranoia. Pt with hx of polysubstance abuse per chart. Pt intubated 4/16-4/24 (self-extubated this AM), s/p bronch on 4/19. No hx of dysphagia per chart review. Pt currently on RA. Oral-motor exam grossly unremarkable. He completed timely volitional swallow and strong volitional, productive cough with pt using Yankauer for oral suctioning. Pt's vocal quality appeared somewhat weak. Pt observed with ice chip x3, thin liquid via tsp and cup (assisted by SLP) with grossly timely swallow initiation noted, occasional multiple swallow per bolus. Pt's laryngeal elevation appeared [place] [place] based on palpation of the anterior neck. Pt also impulsive at times, benefiting from cues to take small, single sips when drinking. Nectar-thick liquid via cup sip also trialed with continued grossly timely swallow initiation, no overt s/s of aspiration/penetration throughout-- no coughing, throat clearing, wet vocal quality, or change in O2 (pt's O2 sats 95-97% throughout PO trials). Pt also observed with puree trials -- adequate A-P bolus transit noted, no oral/lingual residue post-swallow. Noted 2-3 swallows per puree bolus. No overt s/s of aspiration/penetration with solid PO trials.   Recommend initiating Dysphagia I (puree) diet with nectar-thick liquids / no straws, meds in puree, strict aspiration precautions. Pt is considered at a higher risk of aspiration d/t agitation, length of intubation, and self-extubation. *If pt has overt s/s of aspiration with PO intake or worsening respiratory status, recommend downgrade to strict NPO pending re-assessment by ST    Treatment Plan  Requires SLP Intervention: Yes  Duration/Frequency of Treatment: 3-5x/week for LOS   D/C Recommendations: To be determined  Recommendations: Dysphagia treatment  Therapeutic Interventions: Diet tolerance monitoring;Patient/Family education; Therapeutic PO trials with SLP;Oral care    Compensatory Swallowing Strategies  Compensatory Swallowing Strategies: Small bites/sips;Eat/Feed slowly; Remain upright for 30-45 minutes after meals;Upright as possible for all oral intake; No straws    Treatment/Goals  Short-term Goals  Timeframe for Short-term Goals: 5 days (4/29/19)  Long-term Goals  Timeframe for Long-term Goals: 7 days (4/31/19)  Goal 1: The pt will tolerate safest and least restrictive diet without s/s of aspiration. Dysphagia Goals: The patient will tolerate recommended diet without observed clinical signs of aspiration; The patient/caregiver will demonstrate understanding of compensatory strategies for improved swallowing safety. ;The patient will tolerate thin liquids without signs and symptoms of aspiration 10/10 via cup. ;The patient will tolerate mechanical soft foods 10/10. ;The patient will tolerate regular consistency solids 10/10. ;The patient will tolerate instrumental swallowing procedure    General  Chart Reviewed: Yes  Comments: Pt admitted with AMS, agitation, and paranoia. Pt with hx of polysubstance abuse per chart. Pt intubated 4/16-4/24 (self-extubated this AM), s/p bronch on 4/19. Behavior/Cognition: Cooperative; Alert; Requires cueing;Agitated;Confused  Respiratory Status: Room air  O2 Device: None (Room air)  Communication Observation: Functional  Follows Directions: Simple  Dentition: Adequate  Patient Positioning: Upright in bed  Baseline Vocal Quality: Weak  Volitional Cough: Strong;Congested (Pt completed strong volitional, productive cough-- used Yankauer for oral suctioning)  Prior Dysphagia History: No hx of dysphagia per chart review. Consistencies Administered: Puree; Thin - teaspoon; Thin - cup; Ice Chips; Nectar - cup    Vision/Hearing  Vision  Vision: Within Functional Limits  Hearing  Hearing: Within functional limits    Oral Motor Deficits  Oral/Motor  Oral Motor: Within functional limits    Oral Phase Dysfunction  Oral Phase  Oral Phase: Exceptions  Oral Phase Dysfunction  Suspected Premature Bolus Loss: All     Indicators of Pharyngeal Phase Dysfunction   Pharyngeal Phase  Pharyngeal Phase: Exceptions  Pharyngeal Phase Comment: Noted multiple swallow required with majority of PO trials. Pt impulsive at times, benefiting from cues to take small, single sips when drinking. Prognosis  Prognosis  Prognosis for safe diet advancement: fair  Barriers to reach goals: other (comment);behavior;cognitive deficits  Barriers/Prognosis Comment: Length of intubation, self-extubation  Individuals consulted  Consulted and agree with results and recommendations: Patient;RN    Education  Patient Education: Pt educated on reason for referral, role of ST, assessment results and recommendations.   Patient Education Response: Verbalizes understanding;Needs reinforcement  Safety Devices in place: Yes  Type of devices: Left in bed;Call light within reach;Nurse notified    Therapy Time  SLP Individual Minutes  Time In: 1340  Time Out: 1405  Minutes: 25 minutes; dysphagia [place] M.S. [id number] [facility]  Speech-language pathologist  [id number]

## 2019-04-24 NOTE — PLAN OF CARE
Nutrition Problem: Inadequate oral intake  Intervention: Food and/or Nutrient Delivery: Continue NPO  Nutritional Goals: patient will adhere to NPO status until he is cleared by SLP for nutrition progression with appropriate/safe consistencies for po intake; weight will remain stable during remainder of admission

## 2019-04-24 NOTE — PROGRESS NOTES
Tenacity: Thick, Sputum How Obtained: Suctioned  Cough: Strong, productive = 1    Vital Signs   BP (!) 141/96   Pulse 106   Temp 98.3 °F (36.8 °C) (Oral)   Resp 13   Ht 5' 11\" (1.803 m)   Wt 192 lb (87.1 kg)   SpO2 99%   BMI 26.78 kg/m²   SPO2 (COPD values may differ): 90-91% on room air or greater than 92% on FiO2 24- 28% = 1    Peak Flow (asthma only): not applicable = 0    RSI: 0-4 = See once and convert to home regimen or discontinue        Plan       Goals: medication delivery if needed    Patient/caregiver was educated on the proper method of use for Respiratory Care Devices:  No: pt not requiring treatments      Level of patient/caregiver understanding able to:   ? Verbalize understanding   ? Demonstrate understanding       ? Teach back        ? Needs reinforcement       ? No available caregiver               ? Other:     Response to education:  not needed     Is patient being placed on Home Treatment Regimen? No     Does the patient have everything they need prior to discharge? Yes     Comments: pt assessed, interviewed/chart reviewed    Plan of Care: Duoneb Q4H prn    Electronically signed by Melanie Meléndez RCP on 4/24/2019 at 10:58 AM    Respiratory Protocol Guidelines     1. Assessment and treatment by Respiratory Therapy will be initiated for medication and therapeutic interventions upon initiation of aerosolized medication. 2. Physician will be contacted for respiratory rate (RR) greater than 35 breaths per minute. Therapy will be held for heart rate (HR) greater than 140 beats per minute, pending direction from physician. 3. Bronchodilators will be administered via Metered Dose Inhaler (MDI) with spacer when the following criteria are met:  a. Alert and cooperative     b. HR < 140 bpm  c. RR < 30 bpm                d. Can demonstrate a 2-3 second inspiratory hold  4.  Bronchodilators will be administered via Hand Held Nebulizer [facility]) to patients when ANY of the following criteria are met  a. Incognizant or uncooperative          b. Patients treated with HHN at Home        c. Unable to demonstrate proper use of MDI with spacer     d. RR > 30 bpm   5. Bronchodilators will be delivered via Metered Dose Inhaler (MDI), HHN, Aerogen to intubated patients on mechanical ventilation. 6. Inhalation medication orders will be delivered and/or substituted as outlined below. Aerosolized Medications Ordering and Administration Guidelines:    1. All Medications will be ordered by a physician, and their frequency and/or modality will be adjusted as defined by the patients Respiratory Severity Index (RSI) score. 2. If the patient does not have documented COPD, consider discontinuing anticholinergics when RSI is less than 9.  3. If the bronchospasm worsens (increased RSI), then the bronchodilator frequency can be increased to a maximum of every 4 hours. If greater than every 4 hours is required, the physician will be contacted. 4. If the bronchospasm improves, the frequency of the bronchodilator can be decreased, based on the patient's RSI, but not less than home treatment regimen frequency. 5. Bronchodilator(s) will be discontinued if patient has a RSI less than 9 and has received no scheduled or as needed treatment for 72  Hrs. Patients Ordered on a Mucolytic Agent:    1. Must always be administered with a bronchodilator. 2. Discontinue if patient experiences worsened bronchospasm, or secretions have lessened to the point that the patient is able to clear them with a cough. Anti-inflammatory and Combination Medications:    1. If the patient lacks prior history of lung disease, is not using inhaled anti-inflammatory medication at home, and lacks wheezing by examination or by history for at least 24 hours, contact physician for possible discontinuation.

## 2019-04-24 NOTE — PROGRESS NOTES
Progress Note    Admit Date:  4/15/2019    Admitted with agitation, acute resp failure     Per pt's mother , he has never acted this way in the past. His father is bipolar and his mother reports that the patient has gone through rehab before but has never seen a psychiatrist before, but is very concerned with his behaviors stating that he had been sober for 8 months and doing well. Resolved fevers  Self extubated , now on RA    Subjective:  Mr. Rickey Neumann was seen wide awake , off vent  Anxious about sequence of events         Objective:   BP (!) 148/89   Pulse 75   Temp 98.3 °F (36.8 °C) (Oral)   Resp 13   Ht 5' 11\" (1.803 m)   Wt 192 lb (87.1 kg)   SpO2 97%   BMI 26.78 kg/m²       Intake/Output Summary (Last 24 hours) at 4/24/2019 0750  Last data filed at 4/24/2019 0600  Gross per 24 hour   Intake 2832.16 ml   Output 3950 ml   Net -1117.84 ml         Physical Exam:  General:  Awake, anxious   Skin:  Warm and dry  Neck:  JVD absent. Neck supple, left IJ TLC  Chest:  Clear to auscultation, respiration easy. No wheezes, rales or rhonchi. Cardiovascular:  RRR ,S1S2 normal  Abdomen:  Soft, non tender, non distended, BS +  Extremities: patchy edematous skin with peeling on left scapular region   No shingles rash noted   Intact peripheral pulses.  Brisk cap refill, < 2 secs  Neuro: non focal      Medications:   Scheduled Meds:   linezolid  600 mg Intravenous Q12H    meropenem  1 g Intravenous Q8H    famotidine  20 mg Oral BID    polyethylene glycol  17 g Oral Daily    ipratropium  2 puff Inhalation Q4H    albuterol sulfate HFA  2 puff Inhalation Q4H    chlorhexidine  15 mL Mouth/Throat BID    fentanNYL  50 mcg Intravenous Once    midazolam  2 mg Intravenous Once    diazepam  10 mg Oral TID    sennosides-docusate sodium  2 tablet Oral BID    folic acid, thiamine, multi-vitamin with vitamin K infusion   Intravenous Once    sodium chloride flush  10 mL Intravenous 2 times per day    enoxaparin  40 mg Subcutaneous Daily       Continuous Infusions:   dexmedetomidine (PRECEDEX) IV infusion 1 mcg/kg/hr (04/24/19 0654)    dextrose      fentaNYL (SUBLIMAZE) infusion 75 mcg/hr (04/24/19 0213)    propofol 35 mcg/kg/min (04/24/19 0509)    midazolam Stopped (04/22/19 1818)       Data:  CBC:   Recent Labs     04/22/19  0515 04/23/19  0530 04/24/19  0702   WBC 7.9 7.4 7.4   RBC 3.05* 3.23* 3.49*   HGB 9.9* 10.4* 10.7*   HCT 28.3* 29.9* 31.6*   MCV 92.9 92.4 90.6   RDW 14.1 14.4 14.3    286 365     BMP:   Recent Labs     04/22/19  0515 04/23/19  0530 04/24/19  0702    143 141   K 3.5 3.5 4.1    106 104   CO2 26 27 27   PHOS 2.5 3.2 3.5   BUN 9 10 14   CREATININE 0.7* 0.6* 0.6*     BNP: No results for input(s): BNP in the last 72 hours. PT/INR: No results for input(s): PROTIME, INR in the last 72 hours. APTT: No results for input(s): APTT in the last 72 hours. CARDIAC ENZYMES: No results for input(s): CKMB, CKMBINDEX, TROPONINI in the last 72 hours. Invalid input(s): CKTOTAL;3  FASTING LIPID PANEL:  Lab Results   Component Value Date    TRIG 977 (H) 04/23/2019     LIVER PROFILE:   No results for input(s): AST, ALT, ALB, BILIDIR, BILITOT, ALKPHOS in the last 72 hours. XR CHEST PORTABLE   Final Result   Some improvement in left basilar opacity. No significant change otherwise. XR CHEST PORTABLE   Preliminary Result   Findings consistent with improvement but incomplete resolution of left   basilar pleural parenchymal disease when compared to the study 04/22/2019 as   described above. XR CHEST PORTABLE   Final Result   Similar left basilar atelectasis and small pleural effusion. Mild improved   aeration the right mid lung. Otherwise stable chest.         XR CHEST PORTABLE   Final Result   Stable lines and tubes. Left pleural effusion with left basilar atelectasis, stable. XR CHEST PORTABLE   Final Result   Basilar airspace disease, atelectasis or pneumonia. XR CHEST PORTABLE   Final Result   Subtle left streaky basilar opacity which may represent atelectasis or   developing infection. XR CHEST PORTABLE   Final Result   No evidence for acute cardiopulmonary process. XR ABDOMEN (KUB) (SINGLE AP VIEW)   Final Result   No foreign body is identified. Normal bowel gas pattern. XR CHEST PORTABLE   Final Result   Low volume study with vascular crowding versus mild edema, similar to prior. XR CHEST PORTABLE   Final Result   New life support apparatus as above. Mild edema. No pneumothorax. XR CHEST STANDARD (2 VW)   Final Result   No acute cardiopulmonary process. CT Head WO Contrast   Final Result   No acute intracranial abnormality or significant interval change from prior   study 10/01/2016. XR CHEST PORTABLE    (Results Pending)         Assessment:  Active Problems:    History of mixed drug abuse    Acute encephalopathy    Psychosis (HCC)    Agitation    Polysubstance abuse (HCC)    Acute respiratory failure (HCC)    Paranoia (HCC)    Combative behavior    Disorder of electrolytes    Sinus tachycardia    Altered mental status    Pneumonia of left lower lobe due to Pseudomonas species ([facility] [address]  Resolved Problems:    * No resolved hospital problems. *      Plan:    Acute encephalopathy  - suspect related to substance abuse based on patient's history, yet etiology is not entirely clear  - UDS+ THC only - family reports pt being sober for 8 months   - head CT without acute findings  - initially admitted to medical floor but became severely agitated, code violet called, and transferred to ICU  - Multiple sedating meds administered . -  Now off vent.  Mentation normal   - stop sedating meds - can stop valium as well      Acute resp failure  - intubated for airway protection  - pt requiring heavy sedation  - off vent now   - stable on RA     Acute febrile illness  - likely aspiration PNA  - increased resp secretions  - s/p bronch  . Check cultures  - started on vanc and cefepime- changed to zyvox for high fevers   - H. Influenza in resp cultures   - resolved fevers    Polysubstance abuse  - UDS + THC only today  - based on EMR- patient with prior fentanyl abuse      Hypokalemia  - replaced       DVT Prophylaxis: Lovenox   DIET GENERAL; Dysphagia I Pureed; Nectar Thick;  No Drinking Straw  Code Status: Full Code        Olivier Patricio MD  4/24/2019 7:50 AM

## 2019-04-24 NOTE — PROGRESS NOTES
D: Bedside report received from Migue RN, all current drips, treatments, skin issues, and plan of care were reviewed by both RN's, care transferred.

## 2019-04-24 NOTE — PLAN OF CARE
Problem: Nutrition  Intervention: Swallowing evaluation  Note:   Bedside swallow evaluation completed this date. Rosario Malave M.S. [id number] [facility]  Speech-language pathologist  [id number]      Intervention: Aspiration precautions  Note:   Bedside swallow evaluation completed this date.     Rosario Malave M.S. [id number] [facility]  Speech-language pathologist  [id number]

## 2019-04-25 LAB
ANION GAP SERPL CALCULATED.3IONS-SCNC: 12 MMOL/L (ref 3–16)
BUN BLDV-MCNC: 14 MG/DL (ref 7–20)
CALCIUM SERPL-MCNC: 9.2 MG/DL (ref 8.3–10.6)
CHLORIDE BLD-SCNC: 101 MMOL/L (ref 99–110)
CO2: 25 MMOL/L (ref 21–32)
CREAT SERPL-MCNC: 0.6 MG/DL (ref 0.9–1.3)
CULTURE, RESPIRATORY: NORMAL
GFR AFRICAN AMERICAN: >60
GFR NON-AFRICAN AMERICAN: >60
GLUCOSE BLD-MCNC: 128 MG/DL (ref 70–99)
GRAM STAIN RESULT: NORMAL
MAGNESIUM: 2.1 MG/DL (ref 1.8–2.4)
PHOSPHORUS: 3 MG/DL (ref 2.5–4.9)
POTASSIUM SERPL-SCNC: 3.7 MMOL/L (ref 3.5–5.1)
SODIUM BLD-SCNC: 138 MMOL/L (ref 136–145)

## 2019-04-25 PROCEDURE — 97166 OT EVAL MOD COMPLEX 45 MIN: CPT

## 2019-04-25 PROCEDURE — 6360000002 HC RX W HCPCS: Performed by: INTERNAL MEDICINE

## 2019-04-25 PROCEDURE — 83735 ASSAY OF MAGNESIUM: CPT

## 2019-04-25 PROCEDURE — 2580000003 HC RX 258: Performed by: INTERNAL MEDICINE

## 2019-04-25 PROCEDURE — 97116 GAIT TRAINING THERAPY: CPT

## 2019-04-25 PROCEDURE — 92507 TX SP LANG VOICE COMM INDIV: CPT

## 2019-04-25 PROCEDURE — 99233 SBSQ HOSP IP/OBS HIGH 50: CPT | Performed by: INTERNAL MEDICINE

## 2019-04-25 PROCEDURE — 6370000000 HC RX 637 (ALT 250 FOR IP): Performed by: INTERNAL MEDICINE

## 2019-04-25 PROCEDURE — 99232 SBSQ HOSP IP/OBS MODERATE 35: CPT | Performed by: INTERNAL MEDICINE

## 2019-04-25 PROCEDURE — 92526 ORAL FUNCTION THERAPY: CPT

## 2019-04-25 PROCEDURE — 84100 ASSAY OF PHOSPHORUS: CPT

## 2019-04-25 PROCEDURE — 6360000002 HC RX W HCPCS: Performed by: PHYSICIAN ASSISTANT

## 2019-04-25 PROCEDURE — 97530 THERAPEUTIC ACTIVITIES: CPT

## 2019-04-25 PROCEDURE — 2580000003 HC RX 258: Performed by: PHYSICIAN ASSISTANT

## 2019-04-25 PROCEDURE — 80048 BASIC METABOLIC PNL TOTAL CA: CPT

## 2019-04-25 PROCEDURE — 97162 PT EVAL MOD COMPLEX 30 MIN: CPT

## 2019-04-25 PROCEDURE — 1200000000 HC SEMI PRIVATE

## 2019-04-25 RX ORDER — LINEZOLID 600 MG/1
600 TABLET, FILM COATED ORAL EVERY 12 HOURS SCHEDULED
Status: DISCONTINUED | OUTPATIENT
Start: 2019-04-25 | End: 2019-04-27 | Stop reason: HOSPADM

## 2019-04-25 RX ORDER — CALCIUM CARBONATE 200(500)MG
500 TABLET,CHEWABLE ORAL 3 TIMES DAILY PRN
Status: DISCONTINUED | OUTPATIENT
Start: 2019-04-25 | End: 2019-04-27 | Stop reason: HOSPADM

## 2019-04-25 RX ORDER — HYDRALAZINE HYDROCHLORIDE 20 MG/ML
10 INJECTION INTRAMUSCULAR; INTRAVENOUS EVERY 6 HOURS PRN
Status: DISCONTINUED | OUTPATIENT
Start: 2019-04-25 | End: 2019-04-25

## 2019-04-25 RX ORDER — ZOLPIDEM TARTRATE 5 MG/1
5 TABLET ORAL NIGHTLY PRN
Status: DISCONTINUED | OUTPATIENT
Start: 2019-04-25 | End: 2019-04-27 | Stop reason: HOSPADM

## 2019-04-25 RX ADMIN — Medication 10 ML: at 21:46

## 2019-04-25 RX ADMIN — ZOLPIDEM TARTRATE 5 MG: 5 TABLET, FILM COATED ORAL at 21:44

## 2019-04-25 RX ADMIN — MEROPENEM 1 G: 1 INJECTION, POWDER, FOR SOLUTION INTRAVENOUS at 08:33

## 2019-04-25 RX ADMIN — ONDANSETRON 4 MG: 2 INJECTION INTRAMUSCULAR; INTRAVENOUS at 20:04

## 2019-04-25 RX ADMIN — MEROPENEM 1 G: 1 INJECTION, POWDER, FOR SOLUTION INTRAVENOUS at 01:32

## 2019-04-25 RX ADMIN — CALCIUM CARBONATE 500 MG: 500 TABLET, CHEWABLE ORAL at 22:31

## 2019-04-25 RX ADMIN — LINEZOLID 600 MG: 600 INJECTION, SOLUTION INTRAVENOUS at 08:33

## 2019-04-25 RX ADMIN — ENOXAPARIN SODIUM 40 MG: 40 INJECTION SUBCUTANEOUS at 08:33

## 2019-04-25 RX ADMIN — LINEZOLID 600 MG: 600 TABLET, FILM COATED ORAL at 21:43

## 2019-04-25 RX ADMIN — Medication 10 ML: at 08:33

## 2019-04-25 RX ADMIN — MEROPENEM 1 G: 1 INJECTION, POWDER, FOR SOLUTION INTRAVENOUS at 17:49

## 2019-04-25 ASSESSMENT — PAIN SCALES - GENERAL
PAINLEVEL_OUTOF10: 5
PAINLEVEL_OUTOF10: 0

## 2019-04-25 NOTE — PROGRESS NOTES
Pt arrived to unit, oriented to room and unit. Reviewed care plan. Pt noted to be alert with confusion.

## 2019-04-25 NOTE — PROGRESS NOTES
Transfer to 2 from ICU    Upon transferring patient to ordered level of care, patients medications were gathered from the following locations and given to receiving nurse during bedside report:    Locked  room : [] Yes   [x] No   Pyxis Bin:       [] Yes   [x] No      Pyxis Refrigerator:      [] Yes   [x] No   Tube System:       [] Yes   [x] No          The following paperwork was transferred with patient:    Medical record:  [x] Yes   [] No   Initial EKG:   [x] Yes   [] No   72 hour hold:   [] Yes   [x] No       Patient belongings:       [x] Yes   [] No  Belongings include: 2 Pt belonging bags      Heart Monitor:   [] Yes   [x] No   Continuous pulse ox:   [] Yes   [] No      [x] N/A  Tele monitor number NA assigned to patient and placed on patient prior to transfer. Family notified of transfer:  [x] Yes   [] No    Spoke with:  Mimi Roldan      REMINDER to complete 4 eyes skin assessment with each patient transfer

## 2019-04-25 NOTE — PROGRESS NOTES
Inpatient Physical Therapy Evaluation and Treatment    Unit: ICU  Date:  4/25/2019  Patient Name:    Johana Branham  Admitting diagnosis:  Encephalopathy [G93.40]  Admit Date:  4/15/2019  Precautions/Restrictions/WB Status/ Lines/ Wounds/ Oxygen: Fall Risk, IV, intubated 4/16-4/24    Treatment Time:  2653-4373  Treatment Number:  1   Timed Code Treatment Minutes: 16 minutes  Total Treatment Minutes:  26  minutes    Patient Goals for Therapy: \" Get better \"        Discharge Recommendations: TBD pending progression  DME needs for discharge: RW pending progress    Home Health S4 Level Recommendation:  NA  AM-PAC Mobility Score    AM-PAC Inpatient Mobility Raw Score : 17       Preadmission Environment    Pt. Lives With Family  Home environment:    two story home  Steps to enter first floor:   3-4 with railing Steps to enter         Steps to second floor: 5-6 steps then another 5-6 steps to get downstairs to pts bed room  Bathroom:       Walk in [address] [place] [address] owned:      Crutches      Preadmission Status / PLOF:  History of falls             No  Pt. Able to drive          No  Pt Fully independent with ADL's         Yes  Pt. Required assistance from family for:  Cooking and Cleaning   Pt. Fully independent for transfers and gait and walked with: No Device    Pain   No  Rating: NA   Location:   Pain Medicine Status: Denies need    Cognition    A&O x4   Able to follow 2 step commands    Subjective  Patient sitting up in chair with no family present  Pt agreeable to this PT eval & tx. Pt nervous throughout evaluation, constant affirmation that pt was safe. Upper Extremity ROM/Strength  Please see OT evaluation.       Lower Extremity ROM / Strength    AROM WFL: Yes  ROM limitations:     Strength Assessment (measured on a 0-5 scale):  R LE   Quad   5/5   Ant Tib  5/5   Hamstring 5/5   Iliopsoas 4  L LE  Quad   5/5   Ant Tib  5/5   Hamstring 5/5   Iliopsoas 4    Lower Extremity Sensation    WNL    Lower Extremity Proprioception:   WNL    Coordination and Tone  WNL    Balance  Static Sitting:  Good  in chair   Tolerance:   Dynamic Sitting:  Good -   Static Standing: Fair +   Tolerance:   Dynamic Standing: Fair     Bed Mobility   Supine to Sit:   Not Tested  Sit to Supine:  Not Tested  Rolling:   Not Tested  Scooting at EOB: Not Tested  Scooting to [facility]:  Not Tested    Transfer Training     Sit to stand:   CGA and VC for hand placement  Stand to sit:   CGA and VC for hand placement  Bed to Chair:  Not Tested with use of N/A    Gait gait completed as indicated below  Distance:  36 ft  Deviations (firm surface/linoleum): decreased tanna, Decreased step length, Decreased step height and Shuffles   Assistive Device Used:  RW  Level of Assist: Min A  Comment:     Stair Training deferred, pt unsafe/not appropriate to complete stairs at this time  Pt ascended/descended  stairs with N/A with N/A, and use of N/A. Pattern: N/A    Activity Tolerance   Pt completed therapy session with SOB noted w/activity  SpO2:   HR:  BP:     Positioning Needs   Pt reclined in chair, call light and needs in reach    Other  None. Patient/Family Education   Pt educated on role of inpatient PT, POC, importance of continued activity, safety awareness and calling for assist with mobility. Assessment  Pt seen for Physical Therapy evaluation in acute care setting. Pt demonstrated decreased Activity tolerance, Balance, Safety and Strength and decreased independence with Ambulation, Bed Mobility  and Transfers. Continue to assess for most appropriate discharge recommendations as pt progresses with ambulation. Goals : To be met in 3 visits:  1). Independent with LE Ex x 10 reps    To be met in 6 visits:  1). Supine to/from sit: Independent  2). Sit to/from stand: Supervision  3). Bed to chair: Supervision  4). Gait: Ambulate 150 ft   with  Supervision  and use of LRAD  5). Tolerate B LE exercises 3 sets of 10-15 reps  6).   Ascend/descend 5 steps with Min A with use of No Rail and LRAD. Rehabilitation Potential    Fair  Strengths for achieving goals include:   PLOF, Family Support and Pt cooperative  Barriers to achieving goals include:    Weakness and Other: anxiousness    Plan    To be seen 5x / week  while in acute care setting for therapeutic exercises, bed mobility, transfers, progressive gait training, balance training, and family/patient education. Maris Pearce, PT, DPT #[id number]`    If patient discharges from this facility prior to next visit, this note will serve as the Discharge Summary.

## 2019-04-25 NOTE — PLAN OF CARE
Problem: Risk for Impaired Skin Integrity  Goal: Tissue integrity - skin and mucous membranes  Description  Structural intactness and normal physiological function of skin and  mucous membranes.   4/25/2019 0006 by Dane Quiros RN  Outcome: Ongoing  Note:   Small stage 2 to coccyx, meplilex applied  4/24/2019 2231 by Dane Quiros RN  Outcome: Ongoing     Problem: Falls - Risk of:  Goal: Will remain free from falls  Description  Will remain free from falls  4/25/2019 0006 by Dane Quiros RN  Outcome: Ongoing  4/24/2019 2231 by Dane Quiros RN  Outcome: Ongoing  Goal: Absence of physical injury  Description  Absence of physical injury  4/25/2019 0006 by Dane Quiros RN  Outcome: Ongoing  4/24/2019 2231 by Dane Quiros RN  Outcome: Ongoing     Problem: Pain:  Goal: Pain level will decrease  Description  Pain level will decrease  4/25/2019 0006 by Dane Quiros RN  Outcome: Ongoing  Note:   PRN APAP for headache  4/24/2019 2231 by Dane Quiros RN  Outcome: Ongoing  Goal: Control of acute pain  Description  Control of acute pain  4/25/2019 0006 by Dane uQiros RN  Outcome: Ongoing  4/24/2019 2231 by Dane Quiros RN  Outcome: Ongoing  Goal: Control of chronic pain  Description  Control of chronic pain  4/25/2019 0006 by Dane Quiros RN  Outcome: Ongoing  4/24/2019 2231 by Dane Quiros RN  Outcome: Ongoing     Problem: Nutrition  Goal: Optimal nutrition therapy  4/25/2019 0006 by Dane Quiros RN  Outcome: Ongoing  4/24/2019 2231 by Dane Quiros RN  Outcome: Ongoing  4/24/2019 1128 by Giancarlo Nicholson RD, LD  Outcome: Ongoing  Goal: Understanding of nutritional guidelines  4/25/2019 0006 by Dane Quiros RN  Outcome: Ongoing  4/24/2019 2231 by Dane Quiros RN  Outcome: Ongoing  4/24/2019 1128 by Giancarlo Nicholson RD, LD  Outcome: Ongoing     Problem: Discharge Planning:  Goal: Participates in care planning  Description  Participates in care planning  4/25/2019 0006 by Vini Buck RN  Outcome: Ongoing  4/24/2019 2231 by Vini Buck RN  Outcome: Ongoing  Goal: Discharged to appropriate level of care  Description  Discharged to appropriate level of care  4/25/2019 0006 by Vini Buck RN  Outcome: Ongoing  4/24/2019 2231 by Vini Buck RN  Outcome: Ongoing     Problem: Airway Clearance - Ineffective:  Goal: Ability to maintain a clear airway will improve  Description  Ability to maintain a clear airway will improve  Outcome: Ongoing     Problem: Anxiety/Stress:  Goal: Level of anxiety will decrease  Description  Level of anxiety will decrease  4/25/2019 0006 by Vini Buck RN  Outcome: Ongoing  Note:   Pt's HR down to upper 90s and /80 at this time. Relaxation techniques discussed with pt and cool washrag given for pt to put over eyes  4/24/2019 2231 by Vini Buck RN  Outcome: Ongoing     Problem: Aspiration:  Goal: Absence of aspiration  Description  Absence of aspiration  Outcome: Ongoing     Problem:  Bowel Function - Altered:  Goal: Bowel elimination is within specified parameters  Description  Bowel elimination is within specified parameters  4/25/2019 0006 by Vini Buck RN  Outcome: Ongoing  Note:   Small BM  4/24/2019 2231 by Vini Buck RN  Outcome: Ongoing     Problem: Cardiac Output - Decreased:  Goal: Hemodynamic stability will improve  Description  Hemodynamic stability will improve  Outcome: Ongoing     Problem: Fluid Volume - Imbalance:  Goal: Absence of imbalanced fluid volume signs and symptoms  Description  Absence of imbalanced fluid volume signs and symptoms  Outcome: Ongoing     Problem: Gas Exchange - Impaired:  Goal: Levels of oxygenation will improve  Description  Levels of oxygenation will improve  Outcome: Ongoing     Problem: Mental Status - Impaired:  Goal: Mental status will be restored to baseline  Description  Mental status will be restored to baseline  Outcome:

## 2019-04-25 NOTE — PROGRESS NOTES
Speech Language Pathology  Facility/Department: [facility] ICU  Dysphagia Daily Treatment Note    NAME: Francesca Call  : 1980  MRN: [id number]    Patient Diagnosis(es):   Patient Active Problem List    Diagnosis Date Noted    Pneumonia of left lower lobe due to Pseudomonas species ([facility] 2019    Altered mental status     Sinus tachycardia     Acute encephalopathy 2019    Psychosis ([facility] 2019    Agitation 2019    Polysubstance abuse ([facility]     Acute respiratory failure (HCC)     Paranoia (HCC)     Combative behavior     Disorder of electrolytes     History of mixed drug abuse 2019     Allergies: No Known Allergies  Onset Date:   19    Subjective:  Alert in bed, his mother was present in the room, all visible lines intact and all precautions maintained. Pain:  No report of pain    Current Diet:  Level I and nectar thick liquid    Diet Tolerance:  Patient tolerating current diet level without staff reported signs/symptoms of penetration / aspiration. P.O. Trials: see below  Thin       Nectar       Honey       Puree       Solid         Dysphagia and Speech Therapy: This was the first visit with this SLP. The attending nurse reports to SLP that they upgraded patient to thin liquids and he has done well. The patient had his mother present in the room. The patient has reduction to independent problem solving in terms of call bell use. The patient had frequent belching with SLP and reports of increased GERD symptoms. Suggest GI follow up. The patient was seated upright by SLP. The patient had response latency when asked to name the [address]. The patient did not have eye glasses and reports that he cannot see well. He had trouble reading the clock from across the room. He was no certain of the time of day. He did have some periods of initial syllable repetition during articulation. The patient is not a FEES candidate, has history of agitation reported.  He needs MBS to correlate clinical findings and SLP alerted the attending nurse of this, that MBS should be done when his medical status permits. He did not know the day of week, month, year, or date when asked. He had heart rate 100, 02 sat 98%, resp rate 30 and /92. The patient reports having some difficulty swallowing solids presently. The patient denies having cough and choke with PO intake. He reports \" I have some discomfort in my throat as I swallowed metal fencing and I am coughing it up\". He was aware that he was in [place]. His mother tried to correct him and speak on his behalf with orientation tasks. Neurology follow up should be considered. The patient has cognitive communication deficits. He reports that he was more hoarse in his phonation yesterday as compared to today and his mother agreed. He had reported self extubation and ENT assessment should be considered if hoarse phonation continues. He had clear breath sounds in area of larynx. The patient had fair swallow strength and timeliness as per palpable assessment of the areas of the mandible, hyoid bone, and thyroid cartilage. He was able to self feed thin liquid 6 oz in small sips. No cough, no choke, no wet voice, no throat clearing, no increased work of breathing, his color remained good. No clinical signs of aspiration, no oral pooling, no anterior oral loss. He had 1-2 spontaneous repeat swallows per sip. SLP explained safe feeding precautions and aspiration precautions. The following measures may help reduce but not completely eliminate his aspiration risk: slow rate of intake, supervision and assist with meals, small bites and sips, feed only when awake and alert, no straws, maintain good oral hygiene, seat as close to 90 degree angle during meal and 60 min after meals, check for oral pooling, hold PO intake if signs of aspiration develop, alt foods and liquids, dry saliva swallows between bites and sips.  Case findings discussed with the attending nurse. Recommendations:  Continue Dysphagia Level I as tolerated and allow patient to have small cup sips of thin liquids as tolerated, no straws. Needs MBS when cleared my MD to correlate clinical findings. If thin liquids are not tolerated then change back to puree. Patient/Family/Caregiver Education:  Explained scope of practice    Compensatory Strategies:  Per CBSE  Compensatory Swallowing Strategies: Small bites/sips;Eat/Feed slowly;Upright as possible for all oral intake; No straws      Plan:    Continued Dysphagia treatment with goals per plan of care. Discharge Recommendations: If pt discharges from hospital prior to Speech/Swallowing discharge, this note serves as tx and discharge summary. Total Treatment Time:   0636-2281  Speech tx  Swallow tx    THIS IS A LATE ENTRY, THE PATIENT WAS SEEN EARLIER TODAY    Maximilian Palomo CCC-SLP. D [employer]-S  4-25-19

## 2019-04-25 NOTE — PROGRESS NOTES
Care rounds complete with multidisciplinary team.  Plan of care discussed with pt and family, all in agreement. Ok per Dr. Samia Umaña for pt to move transfer to 2W.   Yony Olguin RN

## 2019-04-25 NOTE — PROGRESS NOTES
Pt leaving unit with all personal belongings in stable condition to 2W. Report given to Shirlene Larson RN at bedside for transfer of care. Pt denies needs at this time, all needs met at time of transfer.   Tatyana Olguin RN

## 2019-04-25 NOTE — PROGRESS NOTES
attack)    Scheduled Meds:   linezolid  600 mg Intravenous Q12H    meropenem  1 g Intravenous Q8H    famotidine  20 mg Oral BID    polyethylene glycol  17 g Oral Daily    chlorhexidine  15 mL Mouth/Throat BID    sennosides-docusate sodium  2 tablet Oral BID    folic acid, thiamine, multi-vitamin with vitamin K infusion   Intravenous Once    sodium chloride flush  10 mL Intravenous 2 times per day    enoxaparin  40 mg Subcutaneous Daily     PRN Meds:  ipratropium-albuterol, carboxymethylcellulose PF, glucose, dextrose, glucagon (rDNA), dextrose, medicated lip balm, sodium chloride flush, magnesium hydroxide, ondansetron, potassium chloride **OR** potassium alternative oral replacement **OR** potassium chloride, magnesium sulfate, acetaminophen, [] traMADol **AND** cloNIDine    Results:  CBC:   Recent Labs     19  0530 19  0702   WBC 7.4 7.4   HGB 10.4* 10.7*   HCT 29.9* 31.6*   MCV 92.4 90.6    365     BMP:   Recent Labs     19  0530 19  0702 19  0430    141 138   K 3.5 4.1 3.7    104 101   CO2 27 27 25   PHOS 3.2 3.5 3.0   BUN 10 14 14   CREATININE 0.6* 0.6* 0.6*     LIVER PROFILE: No results for input(s): AST, ALT, LIPASE, BILIDIR, BILITOT, ALKPHOS in the last 72 hours. Invalid input(s):   AMYLASE,  ALB    Cultures:  19 BAL/washings/tracheal aspirate all with H influenza  19 blood coag-negative staph  19 tracheal aspirate NRF  19 tracheal aspirate pending  19 blood no growth to date   19 urine no growth    Films:  CXR 19 left basilar infiltrate    ASSESSMENT:  · Acute hypoxemic respiratory failure  · Acute encephalopathy/AMS, favor acute toxic encephalopathy  · Community acquired pneumonia   · Fevers - improved    · Agitation - he is requiring decreasing concentrations of sedatives  · Polysubstance abuse  · Fever - resolved  · Blistering skin reaction: appears to be a local reaction, and is resolving    PLAN:  · Holding Seroquel, buspar, remeron pending psychiatry input. It is not clear the patient was taking these in any case  · Now Merrem and Zyvox day #3, completed 5 days Cefepime and vancomycin which were stopped due to concern about possible drug fever.   Monitor platelets while on Zyvox  · Psychiatry consultation today  · Prophylaxis: Bactroban, Lovenox, Pepcid  · 2 [place]

## 2019-04-25 NOTE — PROGRESS NOTES
4 Eyes Skin Assessment     The patient is being assess for   Transfer to New Unit    I agree that 2 RN's have performed a thorough Head to Toe Skin Assessment on the patient. ALL assessment sites listed below have been assessed. Areas assessed by both nurses:   [x]   Head, Face, and Ears   [x]   Shoulders, Back, and Chest, Abdomen  [x]   Arms, Elbows, and Hands   [x]   Coccyx, Sacrum, and Ischium  [x]   Legs, Feet, and Heels        Noted 3 open shai to sacrum, upper 1 x 1 x 0.1, middle 0.8 x 0.7 x .01, lower 1 x 1 x 0.1 dry. Noted large abrasion and scab to left upper back/scapula, clear fluid filled blister to left heel, scattered bruises and scratches. **SHARE this note so that the co-signing nurse is able to place an eSignature**    Co-signer eSignature: Electronically signed by Tamie Mejia RN on 4/25/19 at 6:02 PM    Does the Patient have Skin Breakdown?   Yes LDA WOUND CARE was Initiated documentation include the Shannon-wound, Wound Assessment, Measurements, Dressing Treatment, Drainage, and Color\",          Leo Prevention initiated:  Yes   Wound Care Orders initiated:  Yes      [address]  nurse consulted for Pressure Injury (Stage 3,4, Unstageable, DTI, NWPT, Complex wounds)and New or Established Ostomies:  Yes      Primary Nurse eSignature: Electronically signed by Kelly Ewing RN on 4/25/19 at 6:00 PM

## 2019-04-26 LAB
ANION GAP SERPL CALCULATED.3IONS-SCNC: 13 MMOL/L (ref 3–16)
BASOPHILS ABSOLUTE: 0.1 K/UL (ref 0–0.2)
BASOPHILS RELATIVE PERCENT: 0.6 %
BUN BLDV-MCNC: 19 MG/DL (ref 7–20)
C DIFFICILE TOXIN, EIA: NORMAL
CALCIUM SERPL-MCNC: 9.1 MG/DL (ref 8.3–10.6)
CHLORIDE BLD-SCNC: 106 MMOL/L (ref 99–110)
CO2: 23 MMOL/L (ref 21–32)
CREAT SERPL-MCNC: 0.6 MG/DL (ref 0.9–1.3)
EOSINOPHILS ABSOLUTE: 0.1 K/UL (ref 0–0.6)
EOSINOPHILS RELATIVE PERCENT: 1.1 %
GFR AFRICAN AMERICAN: >60
GFR NON-AFRICAN AMERICAN: >60
GLUCOSE BLD-MCNC: 124 MG/DL (ref 70–99)
HCT VFR BLD CALC: 33.6 % (ref 40.5–52.5)
HEMOGLOBIN: 11.3 G/DL (ref 13.5–17.5)
LYMPHOCYTES ABSOLUTE: 1.8 K/UL (ref 1–5.1)
LYMPHOCYTES RELATIVE PERCENT: 15.5 %
MCH RBC QN AUTO: 30.7 PG (ref 26–34)
MCHC RBC AUTO-ENTMCNC: 33.6 G/DL (ref 31–36)
MCV RBC AUTO: 91.1 FL (ref 80–100)
MONOCYTES ABSOLUTE: 1.1 K/UL (ref 0–1.3)
MONOCYTES RELATIVE PERCENT: 9.1 %
NEUTROPHILS ABSOLUTE: 8.7 K/UL (ref 1.7–7.7)
NEUTROPHILS RELATIVE PERCENT: 73.7 %
PDW BLD-RTO: 14 % (ref 12.4–15.4)
PLATELET # BLD: 573 K/UL (ref 135–450)
PMV BLD AUTO: 7 FL (ref 5–10.5)
POTASSIUM SERPL-SCNC: 3.4 MMOL/L (ref 3.5–5.1)
RBC # BLD: 3.68 M/UL (ref 4.2–5.9)
SODIUM BLD-SCNC: 142 MMOL/L (ref 136–145)
WBC # BLD: 11.8 K/UL (ref 4–11)

## 2019-04-26 PROCEDURE — 6370000000 HC RX 637 (ALT 250 FOR IP): Performed by: INTERNAL MEDICINE

## 2019-04-26 PROCEDURE — 85025 COMPLETE CBC W/AUTO DIFF WBC: CPT

## 2019-04-26 PROCEDURE — 99233 SBSQ HOSP IP/OBS HIGH 50: CPT | Performed by: INTERNAL MEDICINE

## 2019-04-26 PROCEDURE — 2580000003 HC RX 258: Performed by: INTERNAL MEDICINE

## 2019-04-26 PROCEDURE — 6360000002 HC RX W HCPCS: Performed by: INTERNAL MEDICINE

## 2019-04-26 PROCEDURE — 97110 THERAPEUTIC EXERCISES: CPT

## 2019-04-26 PROCEDURE — 1200000000 HC SEMI PRIVATE

## 2019-04-26 PROCEDURE — 87449 NOS EACH ORGANISM AG IA: CPT

## 2019-04-26 PROCEDURE — 92526 ORAL FUNCTION THERAPY: CPT

## 2019-04-26 PROCEDURE — 99232 SBSQ HOSP IP/OBS MODERATE 35: CPT | Performed by: INTERNAL MEDICINE

## 2019-04-26 PROCEDURE — 97530 THERAPEUTIC ACTIVITIES: CPT

## 2019-04-26 PROCEDURE — 80048 BASIC METABOLIC PNL TOTAL CA: CPT

## 2019-04-26 PROCEDURE — 87324 CLOSTRIDIUM AG IA: CPT

## 2019-04-26 PROCEDURE — 97116 GAIT TRAINING THERAPY: CPT

## 2019-04-26 PROCEDURE — 36415 COLL VENOUS BLD VENIPUNCTURE: CPT

## 2019-04-26 RX ADMIN — ACETAMINOPHEN 650 MG: 325 TABLET ORAL at 20:11

## 2019-04-26 RX ADMIN — Medication 10 ML: at 20:12

## 2019-04-26 RX ADMIN — ACETAMINOPHEN 650 MG: 325 TABLET ORAL at 01:27

## 2019-04-26 RX ADMIN — ENOXAPARIN SODIUM 40 MG: 40 INJECTION SUBCUTANEOUS at 10:03

## 2019-04-26 RX ADMIN — MEROPENEM 1 G: 1 INJECTION, POWDER, FOR SOLUTION INTRAVENOUS at 10:03

## 2019-04-26 RX ADMIN — POTASSIUM CHLORIDE 40 MEQ: 20 TABLET, EXTENDED RELEASE ORAL at 17:59

## 2019-04-26 RX ADMIN — LINEZOLID 600 MG: 600 TABLET, FILM COATED ORAL at 20:12

## 2019-04-26 RX ADMIN — Medication 10 ML: at 10:03

## 2019-04-26 RX ADMIN — LINEZOLID 600 MG: 600 TABLET, FILM COATED ORAL at 10:03

## 2019-04-26 RX ADMIN — MEROPENEM 1 G: 1 INJECTION, POWDER, FOR SOLUTION INTRAVENOUS at 17:59

## 2019-04-26 RX ADMIN — CALCIUM CARBONATE 500 MG: 500 TABLET, CHEWABLE ORAL at 05:56

## 2019-04-26 RX ADMIN — MEROPENEM 1 G: 1 INJECTION, POWDER, FOR SOLUTION INTRAVENOUS at 01:45

## 2019-04-26 ASSESSMENT — PAIN SCALES - GENERAL
PAINLEVEL_OUTOF10: 6
PAINLEVEL_OUTOF10: 1
PAINLEVEL_OUTOF10: 0

## 2019-04-26 ASSESSMENT — PAIN DESCRIPTION - PAIN TYPE: TYPE: ACUTE PAIN

## 2019-04-26 ASSESSMENT — PAIN DESCRIPTION - DESCRIPTORS: DESCRIPTORS: ACHING

## 2019-04-26 ASSESSMENT — PAIN DESCRIPTION - LOCATION: LOCATION: HEAD

## 2019-04-26 NOTE — PROGRESS NOTES
Shift assessment complete see flowsheets; medication administered see MAR personal belongings and call light within reach. Patient denies further needs.

## 2019-04-26 NOTE — PROGRESS NOTES
Patient has had several loose bowel movements this shift. Patient up to toilet gait unsteady.  Staff assisted to bathroom

## 2019-04-26 NOTE — PROGRESS NOTES
hallucinations (was seeing strafe, c/o chemical attack)    Scheduled Meds:   linezolid  600 mg Oral 2 times per day    meropenem (MERREM) 1 g IVPB (mini-bag)  1 g Intravenous J8J    folic acid, thiamine, multi-vitamin with vitamin K infusion   Intravenous Once    sodium chloride flush  10 mL Intravenous 2 times per day    enoxaparin  40 mg Subcutaneous Daily     PRN Meds:  zolpidem, calcium carbonate, ipratropium-albuterol, carboxymethylcellulose PF, glucose, dextrose, glucagon (rDNA), dextrose, medicated lip balm, sodium chloride flush, magnesium hydroxide, ondansetron, potassium chloride **OR** potassium alternative oral replacement **OR** potassium chloride, magnesium sulfate, acetaminophen    Results:  CBC:   Recent Labs     04/24/19  0702   WBC 7.4   HGB 10.7*   HCT 31.6*   MCV 90.6        BMP:   Recent Labs     04/24/19  0702 04/25/19  0430 04/26/19  0553    138 142   K 4.1 3.7 3.4*    101 106   CO2 27 25 23   PHOS 3.5 3.0  --    BUN 14 14 19   CREATININE 0.6* 0.6* 0.6*     LIVER PROFILE: No results for input(s): AST, ALT, LIPASE, BILIDIR, BILITOT, ALKPHOS in the last 72 hours. Invalid input(s):   AMYLASE,  ALB    Cultures:  4/23/19 tracheal aspirate NRF  4/23/19 blood no growth to date   4/23/19 urine no growth  4/22/19 tracheal aspirate NRF  4/19/19 BAL/washings/tracheal aspirate all with H influenza  4/19/19 blood coag-negative staph    Films:  CXR 4/24/19 left basilar infiltrate    ASSESSMENT:  · Acute hypoxemic respiratory failure  · Acute encephalopathy/AMS, favor acute toxic encephalopathy  · Community acquired pneumonia   · Fevers - had improved, now seem to be recurring, unclear etiology    · Agitation - he is requiring decreasing concentrations of sedatives  · Polysubstance abuse  · Fever - had resolved, recurrent overnight (low grade)  · Blistering skin reaction left shoulder and left thigh region: appears to be a local reaction, and is resolving    PLAN:  · Holding Seroquel, buspar, remeron pending psychiatry input. These meds were only recently prescribed after patient recently left drug rehab. · Now Merrem and Zyvox day #4, completed 5 days Cefepime and vancomycin which were stopped due to concern about possible drug fever. Monitor platelets while on Zyvox  · F/U CBC requested  · Consider checking C.  Diff if diarrhea persists  · Psychiatry to see today  · D/W Dr. Neena Rojas

## 2019-04-26 NOTE — PROGRESS NOTES
Inpatient Physical Therapy Daily Treatment Note    Unit: 2w  Date:  2019  Patient Name:    Joann Ward  Admitting diagnosis:  Encephalopathy [G93.40]  Admit Date:  4/15/2019  Precautions/Restrictions:  Fall Risk, IV    Discharge Recommendations: Home with 24/7 assist   DME needs for discharge:   [address] S4 Level Recommendation: NA  AM-PAC Mobility Score   AM-PAC Inpatient Mobility Raw Score : 20       Treatment Time:  11:17-11:57   Treatment number: 2  Timed Code Treatment Minutes: 40 minutes  Total Treatment Minutes:  40  minutes    Cognition    A&O x4   Able to follow 1 step commands    Subjective  Patient using bathroom with no family present (RN present). Reports \"I am still very weak\". Pt agreeable to this PT tx. Pain   Yes  Ratin/10  Location: \"acid reflux\"  Pain Medicine Status: Not asked; patient requested milk which he uses at home to address acid reflux    Bed Mobility   Supine to Sit:   Independent  Sit to Supine:  Not Tested  Rolling:   Not Tested  Scooting:   Independent    Transfer Training     Sit to stand:   CGA and VC for hand placement (EOB, chair, toilet)  Stand to sit:   CGA and VC for hand placement (EOB, chair, toilet)  Bed to Chair:  CGA with use of RW    Gait Training gait completed as indicated below   Pt ambulated 20' with IV pole & CGA. Pt demonstrates narrow HARPREET, dec tanna, dec step length, and unsteadiness. No LOB noted. Encouraged pt ambulate a second time with RW but he declined, despite education on importance. Therapeutic Exercise all completed bilaterally unless indicated  Ankle pumps: x20  Quad sets:   Glut sets:   Heel slides:   SLR:   Hip abd/add:   LAQ: x10  Seated Marching: x20  Standing Marching: x20    Attempted to encourage additional Eri but pt declined to participate. Balance  Static Sitting:  Good    Tolerance:  WNL  Dynamic Sitting:  Good -   Static Standing: Fair +   Tolerance: WFL  Dynamic Standing: Fair     Patient Education Role of PT, POC, Discharge recommendations, use of RW for transfers and ambulation, safety awareness, transfer techniques and calling for assist with mobility. Positioning Needs       Pt reclined in chair, call light and needs in reach, alarm set and RN in room . ROM Measurements N/A  Knee Flexion:  Knee Extension:     Activity Tolerance   Pt completed therapy session with Pain, \"I hurt all over\"; pt reported fatigue   SpO2:   HR:  BP: 146/91 seated in chair post ambulation & transfer, pt noted to be diaphoretic     Other  None. Assisted pt in bathroom. Assessment :  Patient was able to tolerate TE at EOB. Patient declined additional ambulation due to fatigue. Pt educated on use of RW for transfers and ambulation for increased safety and stability. Recommending pt DC to home with 24/7 assist and home PT. Goals : To be met in 3 visits:  1). Independent with LE Ex x 10 reps     To be met in 6 visits:  1). Supine to/from sit: Independent  2). Sit to/from stand: Supervision  3). Bed to chair: Supervision  4). Gait: Ambulate 150 ft   with  Supervision  and use of LRAD  5). Tolerate B LE exercises 3 sets of 10-15 reps  6). Ascend/descend 5 steps with Min A with use of No Rail and LRAD. Plan   Continue with plan of care. PT present for entire treatment session, providing direct one on one service, and making all skilled judgements and assessments for the treatment while allowing student participation. Teressa Cardozo, SPT   Laverne Pastrana, PT, DPT #[id number]     If patient discharges from this facility prior to next visit, this note will serve as the Discharge Summary.

## 2019-04-26 NOTE — PROGRESS NOTES
Bedside report received from [facility]. Pt requesting tylenol or Motrin for lrg pain. Perfect served Dr Canela Heading for request. Will cont to monitor.

## 2019-04-26 NOTE — PROGRESS NOTES
Speech Language Pathology  Facility/Department: [facility]  Dysphagia Daily Treatment Note    NAME: Kathy Mathias  : 1980  MRN: [id number]    Patient Diagnosis(es):   Patient Active Problem List    Diagnosis Date Noted    Pneumonia of left lower lobe due to Pseudomonas species ([facility] 2019    Altered mental status     Sinus tachycardia     Acute encephalopathy 2019    Psychosis ([facility] 2019    Agitation 2019    Polysubstance abuse ([facility]     Acute respiratory failure (HCC)     Paranoia (HCC)     Combative behavior     Disorder of electrolytes     History of mixed drug abuse 2019     Allergies: No Known Allergies  Onset Date:   See assessment    Subjective:  Alert in bed, no family present in the room at the time of the visit. Pain:  No report of pain    Current Diet:  Regular and thin liquid per MD    Diet Tolerance:  Patient tolerating current diet level without staff reported signs/symptoms of penetration / aspiration. P.O. Trials: patient declined  Thin       Nectar       Honey       Puree       Solid         Dysphagia Therapy:  SLP was asked by attending nurse on  to follow up with patient to potential liquid upgrade. This was conducted, and based on clinical response, thin liquids were suggested. When SLP was asked about patient's solids, SLP indicated that upgrade was not yet suggested until MBS could take place to assess pharyngeal swallow physiology. The patient is noted to be s/p self-extubation. Per nursing note from yesterday, medical management has advanced patient to regular consistency diet at their discretion. The patient was alert in bed with some confusion. He declined PO trials. He denies having cough and choke with meals. He denies having foods sticking in his throat. He denies having trouble with mastication or trouble swallowing pills. SLP explained scope of practice.  SLP explained safe feeding precautions and

## 2019-04-26 NOTE — PLAN OF CARE
Problem: Risk for Impaired Skin Integrity  Goal: Tissue integrity - skin and mucous membranes  Description  Structural intactness and normal physiological function of skin and  mucous membranes.   Outcome: Ongoing     Problem: Falls - Risk of:  Goal: Will remain free from falls  Description  Will remain free from falls  Outcome: Ongoing  Goal: Absence of physical injury  Description  Absence of physical injury  Outcome: Ongoing     Problem: Pain:  Goal: Pain level will decrease  Description  Pain level will decrease  Outcome: Ongoing  Goal: Control of acute pain  Description  Control of acute pain  Outcome: Ongoing  Goal: Control of chronic pain  Description  Control of chronic pain  Outcome: Ongoing     Problem: Nutrition  Goal: Optimal nutrition therapy  Outcome: Ongoing  Goal: Understanding of nutritional guidelines  Outcome: Ongoing     Problem: Airway Clearance - Ineffective:  Goal: Ability to maintain a clear airway will improve  Description  Ability to maintain a clear airway will improve  Outcome: Ongoing     Problem: Anxiety/Stress:  Goal: Level of anxiety will decrease  Description  Level of anxiety will decrease  Outcome: Ongoing     Problem: Aspiration:  Goal: Absence of aspiration  Description  Absence of aspiration  Outcome: Ongoing     Problem: Cardiac Output - Decreased:  Goal: Hemodynamic stability will improve  Description  Hemodynamic stability will improve  Outcome: Ongoing     Problem: Gas Exchange - Impaired:  Goal: Levels of oxygenation will improve  Description  Levels of oxygenation will improve  Outcome: Ongoing     Problem: Mental Status - Impaired:  Goal: Mental status will be restored to baseline  Description  Mental status will be restored to baseline  Outcome: Ongoing     Problem: Pain:  Goal: Recognizes and communicates pain  Description  Recognizes and communicates pain  Outcome: Ongoing  Goal: Control of acute pain  Description  Control of acute pain  Outcome: Ongoing  Goal: Control of chronic pain  Description  Control of chronic pain  Outcome: Ongoing     Problem: Serum Glucose Level - Abnormal:  Goal: Ability to maintain appropriate glucose levels will improve to within specified parameters  Description  Ability to maintain appropriate glucose levels will improve to within specified parameters  Outcome: Ongoing     Problem: Skin Integrity - Impaired:  Goal: Will show no infection signs and symptoms  Description  Will show no infection signs and symptoms  Outcome: Ongoing  Goal: Absence of new skin breakdown  Description  Absence of new skin breakdown  Outcome: Ongoing     Problem: Tissue Perfusion - Cardiopulmonary, Altered:  Goal: Absence of angina  Description  Absence of angina  Outcome: Ongoing  Goal: Hemodynamic stability will improve  Description  Hemodynamic stability will improve  Outcome: Ongoing 62 y.o female with hx of DM, asthma, osteoarthritis present so t the ED for evaluation of rash of buttocks x 2 days.  Pt states that she started using new cleansing spray 4 days ago.  Noticed red rash 2 days ago w/ increased irritation of region over past day promoting visit to the ED.  Denies pain to site, fever, chills, discharge.  No further complaints at this time.

## 2019-04-26 NOTE — FLOWSHEET NOTE
04/26/19 1705   Wound 04/24/19 Coccyx Left pink, open. Skin Teat vs Moisture Assoc Skin Damage   Date First Assessed/Time First Assessed: 04/24/19 2015   Primary Wound Type: Skin Tear  Location: Coccyx  Wound Location Orientation: Left  Wound Description (Comments): pink, open. Skin Teat vs Moisture Assoc Skin Damage   Wound Pressure Stage  2   Dressing Status Clean;Dry; Intact; Changed   Dressing Changed Changed/New   Dressing/Treatment Foam   Dressing Change Due 04/29/19   Wound Assessment Intact; Pink   Drainage Amount None   Wound 04/25/19 Heel Left clear fluid filled blister. Date First Assessed: 04/25/19   Present on Hospital Admission: No  Primary Wound Type: Pressure Injury  Location: Heel  Wound Location Orientation: Left  Wound Description (Comments): clear fluid filled blister. Wound Pressure Stage  2   Dressing Status Clean;Dry; Intact   Dressing/Treatment Foam   Wound Length (cm) 2 cm   Wound Width (cm) 2 cm   Wound Depth (cm) 0 cm   Wound Surface Area (cm^2) 4 cm^2   Change in Wound Size % (l*w) 33.33   Wound Volume (cm^3) 0 cm^3   Wound Assessment Intact  (resolving clear fluid filled blister)   Drainage Amount None   Margins Defined edges; Attached edges   [place]%Wound Bed 100   Wound 04/25/19 Coccyx open and dry   Date First Assessed: 04/25/19   Primary Wound Type: Pressure Injury  Location: Coccyx  Wound Description (Comments): open and dry   Dressing/Treatment Foam   Wound 04/25/19 Coccyx open dry   Date First Assessed: 04/25/19   Present on Hospital Admission: No  Primary Wound Type: Pressure Injury  Location: Coccyx  Wound Description (Comments): open dry   Dressing Status Clean;Dry; Intact; Changed   Dressing/Treatment Foam   Drainage Amount None     Wound Care Note - Met with patient regarding wounds noted on right buttock and Left Heel. Per patient, he has had these wound for about 1 week. Pt has been intubated in ICU this past week.         4/26/2019 Left heel  - Stage 2 Pressure Injury - noted as an intact, clear, fluid filled blister. Fluid is reabsorbing. Periwound/blister is blanchable. 4/26/2019 Coccyx and to the left sacrum abrasions vs sheering skin tears. Thin scattered scabbing noted. No drainage noted, blanchable erythema noted. Recommended Treatment - Sacral Foam dressing placed. Patient is able to turn per self from side to side to off-load the sacrococcygeal area. Foam dressing to the left heel to pad, protect, and promote healing. Pressure Injury Prevention and Wound treatment interventions in place per Protocol. Will continue to follow as needed.   Thanks you,  Siena Lopez RN, [facility]

## 2019-04-26 NOTE — PROGRESS NOTES
Pt slept about 1.5 hours to night. Pt has had many loose BMs & c/o acid reflux. He used tums for a bit of relief. Pt very needy and is calling for many items during the night. Pt denies needing anything at this time.

## 2019-04-26 NOTE — PROGRESS NOTES
Occupational Therapy  Attempted to see patient this pm, patient refused. States he was already up to the chair today and doesn't want to get up again. Declined exercise and ADL at this time, \"maybe later if I don't get released today. \"  Will continue to follow.     Kanchan Kirby, OTR/L [id number]

## 2019-04-26 NOTE — PROGRESS NOTES
Progress Note    Admit Date:  4/15/2019    Admitted with agitation, acute resp failure     Per pt's mother, he has never acted this way in the past. His father is bipolar and his mother reports that the patient has gone through rehab before but has never seen a psychiatrist before, but is very concerned with his behaviors stating that he had been sober for 8 months and doing well. Resolved fevers  Self extubated, now on RA    Subjective:  Mr. Jennifer Washburnlist with low grade fevers, Tmax: 100.9; complains of diarrhea. No other concerns. Objective:   BP (!) 147/90   Pulse 96   Temp 99.1 °F (37.3 °C) (Oral)   Resp 18   Ht 5' 11\" (1.803 m)   Wt 193 lb 4.8 oz (87.7 kg)   SpO2 96%   BMI 26.96 kg/m²       Intake/Output Summary (Last 24 hours) at 4/26/2019 1050  Last data filed at 4/26/2019 0213  Gross per 24 hour   Intake 120 ml   Output 500 ml   Net -380 ml     Physical Exam:  General: young male, up in bed  Skin:  Warm and dry  Neck:  JVD absent. Neck supple  Chest:  Clear to auscultation, respiration easy. No wheezes, rales or rhonchi. Cardiovascular:  RRR ,S1S2 normal  Abdomen:  Soft, non tender, non distended, BS +  Extremities: patchy edematous skin with peeling on left scapular region   No shingles rash noted   Intact peripheral pulses.  Brisk cap refill, < 2 secs  Neuro: non focal    Medications:   Scheduled Meds:   linezolid  600 mg Oral 2 times per day    meropenem (MERREM) 1 g IVPB (mini-bag)  1 g Intravenous W5A    folic acid, thiamine, multi-vitamin with vitamin K infusion   Intravenous Once    sodium chloride flush  10 mL Intravenous 2 times per day    enoxaparin  40 mg Subcutaneous Daily       Continuous Infusions:   dextrose         Data:  CBC:   Recent Labs     04/24/19  0702 04/26/19  0551   WBC 7.4 11.8*   RBC 3.49* 3.68*   HGB 10.7* 11.3*   HCT 31.6* 33.6*   MCV 90.6 91.1   RDW 14.3 14.0    573*     BMP:   Recent Labs     04/24/19  0702 04/25/19  0430 04/26/19  0553    138 142   K 4.1 3.7 3.4*    101 106   CO2 27 25 23   PHOS 3.5 3.0  --    BUN 14 14 19   CREATININE 0.6* 0.6* 0.6*     FASTING LIPID PANEL:  Lab Results   Component Value Date    TRIG 513 (H) 04/23/2019     Radiology:  XR CHEST PORTABLE   Final Result   Some improvement in left basilar opacity. No significant change otherwise. XR CHEST PORTABLE   Final Result   Findings consistent with improvement but incomplete resolution of left   basilar pleuroparenchymal disease when compared to the study 04/22/2019 as   described above. XR CHEST PORTABLE   Final Result   Similar left basilar atelectasis and small pleural effusion. Mild improved   aeration the right mid lung. Otherwise stable chest.         XR CHEST PORTABLE   Final Result   Stable lines and tubes. Left pleural effusion with left basilar atelectasis, stable. XR CHEST PORTABLE   Final Result   Basilar airspace disease, atelectasis or pneumonia. XR CHEST PORTABLE   Final Result   Subtle left streaky basilar opacity which may represent atelectasis or   developing infection. XR CHEST PORTABLE   Final Result   No evidence for acute cardiopulmonary process. XR ABDOMEN (KUB) (SINGLE AP VIEW)   Final Result   No foreign body is identified. Normal bowel gas pattern. XR CHEST PORTABLE   Final Result   Low volume study with vascular crowding versus mild edema, similar to prior. XR CHEST PORTABLE   Final Result   New life support apparatus as above. Mild edema. No pneumothorax. XR CHEST STANDARD (2 VW)   Final Result   No acute cardiopulmonary process. CT Head WO Contrast   Final Result   No acute intracranial abnormality or significant interval change from prior   study 10/01/2016.                Maureen Ballard have reviewed the chart on [facility] and personally interviewed and examined patient, reviewed the data (labs and imaging) and after discussion with my PA formulated

## 2019-04-26 NOTE — PROGRESS NOTES
Spoke with sylvia at [facility] and asked if the dr was coming to re evaluate the pt and sylvia said yes

## 2019-04-27 VITALS
SYSTOLIC BLOOD PRESSURE: 133 MMHG | WEIGHT: 193.3 LBS | RESPIRATION RATE: 16 BRPM | TEMPERATURE: 98.6 F | HEIGHT: 71 IN | BODY MASS INDEX: 27.06 KG/M2 | HEART RATE: 88 BPM | DIASTOLIC BLOOD PRESSURE: 75 MMHG | OXYGEN SATURATION: 96 %

## 2019-04-27 LAB
ANION GAP SERPL CALCULATED.3IONS-SCNC: 10 MMOL/L (ref 3–16)
BASOPHILS ABSOLUTE: 0.2 K/UL (ref 0–0.2)
BASOPHILS RELATIVE PERCENT: 1.4 %
BUN BLDV-MCNC: 21 MG/DL (ref 7–20)
CALCIUM SERPL-MCNC: 9.6 MG/DL (ref 8.3–10.6)
CHLORIDE BLD-SCNC: 102 MMOL/L (ref 99–110)
CO2: 26 MMOL/L (ref 21–32)
CREAT SERPL-MCNC: 0.7 MG/DL (ref 0.9–1.3)
EOSINOPHILS ABSOLUTE: 0.2 K/UL (ref 0–0.6)
EOSINOPHILS RELATIVE PERCENT: 1.5 %
GFR AFRICAN AMERICAN: >60
GFR NON-AFRICAN AMERICAN: >60
GLUCOSE BLD-MCNC: 129 MG/DL (ref 70–99)
HCT VFR BLD CALC: 35.4 % (ref 40.5–52.5)
HEMOGLOBIN: 11.9 G/DL (ref 13.5–17.5)
LYMPHOCYTES ABSOLUTE: 2.3 K/UL (ref 1–5.1)
LYMPHOCYTES RELATIVE PERCENT: 16.3 %
MCH RBC QN AUTO: 31.3 PG (ref 26–34)
MCHC RBC AUTO-ENTMCNC: 33.7 G/DL (ref 31–36)
MCV RBC AUTO: 92.9 FL (ref 80–100)
MONOCYTES ABSOLUTE: 0.8 K/UL (ref 0–1.3)
MONOCYTES RELATIVE PERCENT: 5.5 %
NEUTROPHILS ABSOLUTE: 10.4 K/UL (ref 1.7–7.7)
NEUTROPHILS RELATIVE PERCENT: 75.3 %
PDW BLD-RTO: 13.9 % (ref 12.4–15.4)
PLATELET # BLD: 668 K/UL (ref 135–450)
PMV BLD AUTO: 6.2 FL (ref 5–10.5)
POTASSIUM SERPL-SCNC: 3.6 MMOL/L (ref 3.5–5.1)
RBC # BLD: 3.81 M/UL (ref 4.2–5.9)
SODIUM BLD-SCNC: 138 MMOL/L (ref 136–145)
WBC # BLD: 13.8 K/UL (ref 4–11)

## 2019-04-27 PROCEDURE — 36415 COLL VENOUS BLD VENIPUNCTURE: CPT

## 2019-04-27 PROCEDURE — 99232 SBSQ HOSP IP/OBS MODERATE 35: CPT | Performed by: INTERNAL MEDICINE

## 2019-04-27 PROCEDURE — 85025 COMPLETE CBC W/AUTO DIFF WBC: CPT

## 2019-04-27 PROCEDURE — 2580000003 HC RX 258: Performed by: INTERNAL MEDICINE

## 2019-04-27 PROCEDURE — 80048 BASIC METABOLIC PNL TOTAL CA: CPT

## 2019-04-27 PROCEDURE — 6370000000 HC RX 637 (ALT 250 FOR IP): Performed by: INTERNAL MEDICINE

## 2019-04-27 PROCEDURE — 6360000002 HC RX W HCPCS: Performed by: INTERNAL MEDICINE

## 2019-04-27 PROCEDURE — 99239 HOSP IP/OBS DSCHRG MGMT >30: CPT | Performed by: INTERNAL MEDICINE

## 2019-04-27 RX ORDER — ERYTHROMYCIN 5 MG/G
OINTMENT OPHTHALMIC DAILY
Status: ON HOLD | COMMUNITY
End: 2019-04-27 | Stop reason: HOSPADM

## 2019-04-27 RX ADMIN — Medication 10 ML: at 09:13

## 2019-04-27 RX ADMIN — MEROPENEM 1 G: 1 INJECTION, POWDER, FOR SOLUTION INTRAVENOUS at 10:22

## 2019-04-27 RX ADMIN — MEROPENEM 1 G: 1 INJECTION, POWDER, FOR SOLUTION INTRAVENOUS at 02:11

## 2019-04-27 RX ADMIN — ZOLPIDEM TARTRATE 5 MG: 5 TABLET, FILM COATED ORAL at 02:19

## 2019-04-27 RX ADMIN — LINEZOLID 600 MG: 600 TABLET, FILM COATED ORAL at 09:12

## 2019-04-27 ASSESSMENT — PAIN SCALES - GENERAL: PAINLEVEL_OUTOF10: 0

## 2019-04-27 NOTE — PROGRESS NOTES
Psych evaluated patient. Recommended patient be admitted for further treatment to psych unit; patient declined. Psych said patient does not meet criteria to be held

## 2019-04-27 NOTE — PROGRESS NOTES
PM assessment completed. See flow sheet. Pt A&O X 4. Resp E&E with no distress noted. PRN pain med given per request, pt rated rt leg pain 6/10. Pt.denies any further needs at this time. Call light within reach. Will continue to monitor.

## 2019-04-27 NOTE — PROGRESS NOTES
Pulmonary & Critical Care Medicine Progress Note    CC: Agitation, encephalopathy    Subjective:   Feels good, no shortness of breath    Invasive Lines:  Peripheral    MV 19 - 19  Vent Mode: AC/VC Rate Set: 14 bmp/Vt Ordered: 450 mL/ /FiO2 : 30 %  No results for input(s): PHART, DSZ3PSK, PO2ART in the last 72 hours. IV:   dextrose         Vitals:  Blood pressure 133/75, pulse 88, temperature 98.6 °F (37 °C), temperature source Oral, resp. rate 16, height 5' 11\" (1.803 m), weight 193 lb 4.8 oz (87.7 kg), SpO2 96 %. on room air  Temp  Av.2 °F (36.8 °C)  Min: 97.4 °F (36.3 °C)  Max: 99.3 °F (37.4 °C)    Intake/Output Summary (Last 24 hours) at 2019 1438  Last data filed at 2019 1220  Gross per 24 hour   Intake 240 ml   Output --   Net 240 ml     EXAM:  Constitutional:  No acute distress. HENT:  Oropharynx is clear and moist.   Neck: No tracheal deviation present. Cardiovascular: Normal heart sounds. No lower extremity edema. Pulmonary/Chest: No wheezes. No rhonchi. No rales. No decreased breath sounds. No accessory muscle usage or stridor. Musculoskeletal: No cyanosis. No clubbing. Skin: Skin is warm and dry. Psychiatric: Normal mood and affect.   Neurologic: speech fluent, alert and oriented, strength symmetric     Scheduled Meds:   linezolid  600 mg Oral 2 times per day    meropenem (MERREM) 1 g IVPB (mini-bag)  1 g Intravenous V9A    folic acid, thiamine, multi-vitamin with vitamin K infusion   Intravenous Once    sodium chloride flush  10 mL Intravenous 2 times per day    enoxaparin  40 mg Subcutaneous Daily     PRN Meds:  zolpidem, calcium carbonate, ipratropium-albuterol, carboxymethylcellulose PF, glucose, dextrose, glucagon (rDNA), dextrose, medicated lip balm, sodium chloride flush, magnesium hydroxide, ondansetron, potassium chloride **OR** potassium alternative oral replacement **OR** potassium chloride, magnesium sulfate, acetaminophen    Results:  CBC:   Recent Labs     04/26/19  0551 04/27/19  0609   WBC 11.8* 13.8*   HGB 11.3* 11.9*   HCT 33.6* 35.4*   MCV 91.1 92.9   * 668*     BMP:   Recent Labs     04/25/19  0430 04/26/19  0553 04/27/19  0609    142 138   K 3.7 3.4* 3.6    106 102   CO2 25 23 26   PHOS 3.0  --   --    BUN 14 19 21*   CREATININE 0.6* 0.6* 0.7*     LIVER PROFILE: No results for input(s): AST, ALT, LIPASE, BILIDIR, BILITOT, ALKPHOS in the last 72 hours. Invalid input(s): AMYLASE,  ALB    Cultures:  4/26/19 C. diff negative   4/23/19 tracheal aspirate NRF  4/23/19 blood no growth to date   4/23/19 urine no growth  4/22/19 tracheal aspirate NRF  4/19/19 BAL/washings/tracheal aspirate all with H influenza  4/19/19 blood coag-negative staph    Films:  CXR 4/24/19 left basilar infiltrate    ASSESSMENT:  · Acute hypoxemic respiratory failure  · Acute encephalopathy/AMS, favor acute toxic encephalopathy  · Community acquired pneumonia   · Fevers - had improved, now seem to be recurring, unclear etiology    · Agitation - he is requiring decreasing concentrations of sedatives  · Polysubstance abuse  · Fever - had resolved, recurrent overnight (low grade)  · Blistering skin reaction left shoulder and left thigh region: appears to be a local reaction, and is resolving    PLAN:  · Holding Seroquel, buspar, remeron pending psychiatry input. These meds were only recently prescribed after patient recently left drug rehab. · Now Merrem and Zyvox day #5/5, completed 5 days Cefepime and vancomycin which were stopped due to concern about possible drug fever.     · Await psychiatry input  · Follow-up chest x-ray in 4 weeks with PCP  · Okay with me for discharge from a pulmonary perspective

## 2019-04-27 NOTE — PROGRESS NOTES
Shift assessment complete see flowsheets; medication administered see MAR; call light and personal belongings within reach. Patient denies further needs.
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
## 2019-04-27 NOTE — PROGRESS NOTES
Physical Therapy  Attempted PT session. Patient is scheduled to leave. Noted in previous PT note that the recommendation was a RW for mobility at d/c. Patient declined the offer of RW, despite discussion of benefits. Patient again declined RW to use after d/c. RN aware.     Latonia Him, PT #[id number]

## 2019-04-27 NOTE — PROGRESS NOTES
Pt resting in bed w eyes open, no signs of distress noted. Denies any needs. Call light within reach. Will cont to monitor.

## 2019-04-27 NOTE — PROGRESS NOTES
Patient leaving via ambulatory to home. Discharged instructions given patient voiced understanding. Discharge instructions . IV removed. CP & PE completed. Pt leaving with all personal belongings. No further needs @ discharge.

## 2019-04-27 NOTE — DISCHARGE SUMMARY
Name:  Geary Eisenmenger  Room:  8581/3769-29  MRN:    [id number]    Discharge Summary      This discharge summary is in conjunction with a complete physical exam done on the day of discharge. Discharging Physician: Dr. Nevarez Severe: 4/15/2019  Discharge:   4/27/2019     HPI taken from admission H&P:     The patient is a 45 y.o. male with opiate abuse, reportedly recently discharged from rehab who presented to the ED complaining that there were \"chemicals in (his) mouth having an internal warfare. \"  He was seen in the ER on 4/13/19 complaining that there were \"shards in (his) lungs. \"  The ER provider on 4/13 mentions concern for amphetamine abuse, but UDS is negative for amphetamines (positive for barbiturates and THC only on that date). He was seen by psychiatry on that date and cleared for discharge from ER. He returned to ER on 4/15 with above complaint. UDS + only for THC now, labs unrevealing, and CT head not acute. Throughout ER stay he became increasingly agitated, treated with PO zyprexa (total of 15 mg since 2340 on 4/15), 1 SL suboxone, 3 mg of ativan, 50 mg IV benadryl, and 1 mg IV haldol. He could not be evaluated by the psychiatry team due to his mental status. He was admitted for further eval.     After arriving to the medical floor his mentation worsened with increasing agitation that did not respond to another 2 mg IV ativan. He could not be safely restrained using a posey belt. A code violet was called. IM haldol was ordered and he was transferred to ICU for further care. Diagnoses this Admission and Hospital Course     Acute Toxic?  Encephalopathy  - suspect related to substance abuse based on patient's hx, yet etiology is not entirely clear  - UDS+ THC only - family reports pt being sober for 8 months   - head CT without acute findings  - initially admitted to medical floor but became severely agitated, code violet called, and transferred to ICU  - Multiple sedating meds administered, required intubation    - Now off vent. Mentation normal   - off all meds   - psych consulted-refused in patient at [facility]. Discharged home.      Acute Hypoxic Resp failure - Resolved  - intubated for airway protection  - pt required heavy sedation  - off vent now   - stable on RA      Acute Febrile illness  - likely aspiration PNA  - increased resp secretions  - s/p bronch. H. Influenza in resp cultures  - Now Merrem and Zyvox day #4, completed 5 days Cefepime and vancomycin which were stopped due to concern about possible drug fever. Monitor platelets while on Zyvox. Finished antibiotics during hospital stay. Diarrhea improved  - loose watery stools  - checked C diff- negative      Blistering Skin Reaction  - left shoulder and thigh region  - resolving     Hypokalemia  - replaced      Leukocytosis  - mild, monitored      Polysubstance abuse  - UDS + THC only today  - based on EMR- patient with prior fentanyl abuse      Procedures (Please Review Full Report for Details)  Endotracheal intubation  Bronchoscopy 4/19/19  The scope was passed with ease via the mouth. The vocal cords looked normal. Topical lidocaine was used on the vocal cords and inside the bronchial tree. A complete airway inspection was performed. No endobronchial lesions were identified. There were thick, purulent secretions throughout the bilateral lower lobe airways. Therapeutic aspiration was performed. Washings were obtained throughout the airways. A Bronchoalveolar lavage was obtained from the left lower lobe with good return. Estimated blood loss <5ml.     Consults    Psychiatry  Pulmonology       Physical Exam at Discharge:    BP (!) 145/83   Pulse 85   Temp 97.4 °F (36.3 °C) (Oral)   Resp 16   Ht 5' 11\" (1.803 m)   Wt 193 lb 4.8 oz (87.7 kg)   SpO2 97%   BMI 26.96 kg/m²     General: young male, up in bed  Skin:  Warm and dry  Neck:  JVD absent. Neck supple  Chest:  Clear to auscultation, respiration easy.  No

## 2019-04-27 NOTE — PROGRESS NOTES
Occupational Therapy  OT follow up attempted. Pt in bed with eyes closed. Pt was easily awaken. Pt declined OT services this date stating \"I just worked with one of you\". Pt was informed that it would have been PT not OT. Pt stated \"I have been up a couple of times and I am just tired. \". OT educated on importance of OOB activity this date. Pt declined nicely and stated \"I don't think I can do it today. Have a good day and God bless\".     Mook Foss, [address] [facility]

## 2019-04-27 NOTE — PROGRESS NOTES
Tenacity: Thick, Sputum How Obtained: Suctioned  Cough: Strong, spontaneous, non-productive = 0    Vital Signs   /75   Pulse 88   Temp 98.6 °F (37 °C) (Oral)   Resp 16   Ht 5' 11\" (1.803 m)   Wt 193 lb 4.8 oz (87.7 kg)   SpO2 96%   BMI 26.96 kg/m²   SPO2 (COPD values may differ): Greater than or equal to 92% on room air = 0    Peak Flow (asthma only): not applicable = 0    RSI: 0-4 = See once and convert to home regimen or discontinue        Plan       Goals: medication delivery, mobilize retained secretions, volume expansion and improve oxygenation    Patient/caregiver was educated on the proper method of use for Respiratory Care Devices:  Yes      Level of patient/caregiver understanding able to:   ? Verbalize understanding   ? Demonstrate understanding       ? Teach back        ? Needs reinforcement       ? No available caregiver               ? Other:     Response to education:  Good     Is patient being placed on Home Treatment Regimen? NA     Does the patient have everything they need prior to discharge? NA     Comments: pt assessed and chart reviewed   Plan of Care:  Tashi Vaughan    Electronically signed by Gloria Horne on 4/27/2019 at 3:15 PM    Respiratory Protocol Guidelines     1. Assessment and treatment by Respiratory Therapy will be initiated for medication and therapeutic interventions upon initiation of aerosolized medication. 2. Physician will be contacted for respiratory rate (RR) greater than 35 breaths per minute. Therapy will be held for heart rate (HR) greater than 140 beats per minute, pending direction from physician. 3. Bronchodilators will be administered via Metered Dose Inhaler (MDI) with spacer when the following criteria are met:  a. Alert and cooperative     b. HR < 140 bpm  c. RR < 30 bpm                d. Can demonstrate a 2-3 second inspiratory hold  4.  Bronchodilators will be administered via Hand Held Nebulizer [facility]) to patients when ANY of the following

## 2019-04-27 NOTE — PROGRESS NOTES
Perfect serve Dr. Erna Partida regarding patient status of discharge. Waiting for psych to see patient. If patient is cleared by psych orders will be put in for discharge.  Psych to see patient between 2-6pm.

## 2019-04-28 LAB
BLOOD CULTURE, ROUTINE: NORMAL
CULTURE, BLOOD 2: NORMAL

## 2019-04-28 NOTE — CARE COORDINATION
INTERDISCIPLINARY PLAN OF CARE CONFERENCE    Date/Time: 4/19/2019 9:43 AM  Completed by: Chris Ortiz Case Management      Patient Name:  Napoleon Roach  YOB: 1980  Admitting Diagnosis: Encephalopathy [G93.40]     Admit Date/Time:  4/15/2019  7:46 PM    Chart reviewed. Interdisciplinary team met to discuss patient progress and discharge plans. Disciplines included Case Management, Nursing, and Dietitian. Current Status: remains in ICU for continued care, intubated/mechanically ventilated     PT/OT recommendation: none to date     Anticipated Discharge Date: TBD  Expected D/C Disposition:  TBD  Confirmed plan with patient and/or family No  Discharge Plan Comments: Chart reviewed. Patient remains intubated ad mechanically ventilated. Noted tentative plan for residential drug/alcohol treatment following hospital stay. CM will follow and provide addiction resources when patient able to participate in conversation.            Home O2 in place on admit: No  Pt informed of need to bring portable home O2 tank on day of discharge for nursing to connect prior to leaving:  Not Indicated  Verbalized agreement/Understanding:  Not Indicated
INTERDISCIPLINARY PLAN OF CARE CONFERENCE    Date/Time: 4/22/2019 10:26 AM  Completed by: Kristan Cooper Case Management      Patient Name:  Kathy Mathias  YOB: 1980  Admitting Diagnosis: Encephalopathy [G93.40]     Admit Date/Time:  4/15/2019  7:46 PM    Chart reviewed. Interdisciplinary team met to discuss patient progress and discharge plans. Disciplines included Case Management, Nursing, and Dietitian. Current Status:ongoing    PT/OT recommendation:tbd    Anticipated Discharge Date: tbd  Expected D/C Disposition:  Rehab  Confirmed plan with patient and/or family No  Discharge Plan Comments: Reviewed chart. Pt remains on Ventilator. No family at bedside. Noted tentative plan for residential drug/alcohol treatment. Will need resource folder and support pending medical progress. Following.           Home O2 in place on admit: No  Pt informed of need to bring portable home O2 tank on day of discharge for nursing to connect prior to leaving:  Not Indicated  Verbalized agreement/Understanding:  Not Indicated
INTERDISCIPLINARY PLAN OF CARE CONFERENCE    Date/Time: 4/26/2019 3:29 PM  Completed by: Scout Helms, Case Management      Patient Name:  Morgan Wetzel  YOB: 1980  Admitting Diagnosis: Encephalopathy [G93.40]     Admit Date/Time:  4/15/2019  7:46 PM    Chart reviewed. Interdisciplinary team met to discuss patient progress and discharge plans. Disciplines included Case Management, Nursing, and Dietitian. Current Status:  stable    PT/OT recommendation: tba    Anticipated Discharge Date: tba  Expected D/C Disposition:  Rehab  Confirmed plan with patient and/or family Yes  Discharge Plan Comments:  Spoke with patient and he states he is planning inpt rehab and that his mother has chosen a facility and that CM should speak with her. Called Aman Cesra [phone], cell, and she states she has been in touch with [facility], [phone] or [phone]. She states they accept patient's insurance. Called but unable to reach anyone at these numbers. LVM.       Home O2 in place on admit: No  Pt informed of need to bring portable home O2 tank on day of discharge for nursing to connect prior to leaving:  Not Indicated  Verbalized agreement/Understanding:  Not Indicated
Pt discharged home after CM left for the day. Per RN notes pt planning for inpt rehab @ [facility], mother is helping him secure a bed.
Alex Quarles ([place]) and [facility]). CM will follow up with patient and family when pt is awake and alert.    Estimated time spent: 30 min

## 2019-04-29 ENCOUNTER — TELEPHONE (OUTPATIENT)
Dept: PULMONOLOGY | Age: 39
End: 2019-04-29

## 2019-04-29 ENCOUNTER — TELEPHONE (OUTPATIENT)
Dept: FAMILY MEDICINE CLINIC | Age: 39
End: 2019-04-29

## 2019-04-29 NOTE — TELEPHONE ENCOUNTER
Inpatient Notes   Received: 2 days ago   Message Contents   MD Trey Aguiar MA             Recommend follow-up chest x-ray in 4 weeks. Alva Posadas can do this with his PCP. Brissa Cardenas he does not have a PCP, with [facility]

## 2019-04-29 NOTE — TELEPHONE ENCOUNTER
[facility] Transitions Initial Follow Up Call    Outreach made within 2 business days of discharge: Yes    Patient: Yonathan Rooney Patient : 1980   MRN: [id number]  Reason for Admission: There are no discharge diagnoses documented for the most recent discharge. Discharge Date: 19       Spoke with: left message    Discharge department/facility: [facility]    Follow Up  No future appointments.     Dejah Knutson, [address]

## 2019-04-29 NOTE — TELEPHONE ENCOUNTER
Called pt, male answered and states that pt is not going to be available until tomorrow. Will try to reach pt tomorrow.

## 2019-04-30 ENCOUNTER — HOSPITAL ENCOUNTER (EMERGENCY)
Age: 39
Discharge: HOME | End: 2019-04-30
Attending: EMERGENCY MEDICINE
Payer: COMMERCIAL

## 2019-04-30 VITALS
DIASTOLIC BLOOD PRESSURE: 87 MMHG | BODY MASS INDEX: 26.6 KG/M2 | WEIGHT: 190 LBS | HEART RATE: 84 BPM | TEMPERATURE: 99 F | RESPIRATION RATE: 17 BRPM | SYSTOLIC BLOOD PRESSURE: 145 MMHG | HEIGHT: 71 IN | OXYGEN SATURATION: 100 %

## 2019-04-30 DIAGNOSIS — L89.899 PRESSURE ULCER OF BOTH FEET: Primary | ICD-10-CM

## 2019-04-30 PROCEDURE — 99282 EMERGENCY DEPT VISIT SF MDM: CPT

## 2019-04-30 ASSESSMENT — PAIN SCALES - GENERAL: PAINLEVEL_OUTOF10: 6

## 2019-04-30 NOTE — ED NOTES
Discharge instructions reviewed with Mr. Alie Lazar. He verbalized understanding. Copy of discharge instructions and prescriptions given. Mr. Alie Lazar was discharged to home in good condition per personal vehicle, friend/family driving. He exited the ED without difficulty.         Konstantin Maurer RN  04/30/19 [id number]

## 2019-04-30 NOTE — TELEPHONE ENCOUNTER
[facility] Trans[facility] Initial Follow Up Call    Outreach made within 2 business days of discharge: Yes    Patient: Napoleon Roach Patient : 1980   MRN: [id number]  Reason for Admission: There are no discharge diagnoses documented for the most recent discharge. Discharge Date: 19       Spoke with: no answer    Discharge department/facility: [facility]    Scheduled appointment with PCP within 7-14 days    Follow Up  No future appointments.     Naeem Neumann, [address]

## 2019-04-30 NOTE — ED PROVIDER NOTES
TABLET    Take 100 mg by mouth nightly         ALLERGIES     Patient has no known allergies.     FAMILYHISTORY       Family History   Problem Relation Age of Onset    High Blood Pressure Mother     High Blood Pressure Father     Bipolar Disorder Father           SOCIAL HISTORY       Social History     Socioeconomic History    Marital status: Single     Spouse name: None    Number of children: None    Years of education: None    Highest education level: None   Occupational History    None   Social Needs    Financial resource strain: None    Food insecurity:     Worry: None     Inability: None    Transportation needs:     Medical: None     Non-medical: None   Tobacco Use    Smoking status: Former Smoker     Packs/day: 0.50     Years: 3.00     Pack years: 1.50     Types: Cigarettes    Smokeless tobacco: Never Used   Substance and Sexual Activity    Alcohol use: No    Drug use: Not Currently     Frequency: 3.0 times per week     Types: Marijuana, Opiates , Methamphetamines     Comment: Heroin, Fentanyl    Sexual activity: Yes     Partners: Female   Lifestyle    Physical activity:     Days per week: None     Minutes per session: None    Stress: None   Relationships    Social connections:     Talks on phone: None     Gets together: None     Attends Shinto service: None     Active member of club or organization: None     Attends meetings of clubs or organizations: None     Relationship status: None    Intimate partner violence:     Fear of current or ex partner: None     Emotionally abused: None     Physically abused: None     Forced sexual activity: None   Other Topics Concern    None   Social History Narrative    None       SCREENINGS    [place] Coma Scale  Eye Opening: Spontaneous  Best Verbal Response: Oriented  Best Motor Response: Obeys commands  K[place]on Coma Scale Score: 15        PHYSICAL EXAM    (up to 7 for level 4, 8 or more for level 5)     ED Triage Vitals [04/30/19 1641]   BP Temp Temp src Pulse Resp SpO2 Height Weight   (!) 147/88 99 °F (37.2 °C) -- 86 18 100 % 5' 11\" (1.803 m) 190 lb (86.2 kg)       Physical Exam   Constitutional: He is oriented to person, place, and time. He appears well-developed and well-nourished. HENT:   Head: Normocephalic and atraumatic. Nose: Nose normal.   Eyes: Right eye exhibits no discharge. Left eye exhibits no discharge. Neck: Normal range of motion. Neck supple. Pulmonary/Chest: Effort normal. No respiratory distress. Musculoskeletal: Normal range of motion. Neurological: He is alert and oriented to person, place, and time. Skin: Skin is warm and dry. He is not diaphoretic. There is erythema. Erythema to bilateral elbows, right heel, and left middle back    Fluid filled blister and erythema to left heel. Psychiatric: He has a normal mood and affect. His behavior is normal.   Nursing note and vitals reviewed. DIAGNOSTIC RESULTS   LABS:    Labs Reviewed - No data to display    All other labs were within normal range or not returned as of this dictation. EKG: All EKG's are interpreted by the Emergency Department Physician who either signs orCo-signs this chart in the absence of a cardiologist.  Please see their note for interpretation of EKG. RADIOLOGY:   Non-plain film images such as CT, Ultrasound and MRI are read by the radiologist. Plain radiographic images are visualized andpreliminarily interpreted by the  ED Provider with the below findings:        Interpretation perthe Radiologist below, if available at the time of this note:    No orders to display     No results found.       PROCEDURES   Unless otherwise noted below, none     Procedures    CRITICAL CARE TIME   N/A    CONSULTS:  None      EMERGENCY DEPARTMENT COURSE and DIFFERENTIALDIAGNOSIS/MDM:   Vitals:    Vitals:    04/30/19 1641   BP: (!) 147/88   Pulse: 86   Resp: 18   Temp: 99 °F (37.2 °C)   SpO2: 100%   Weight: 190 lb (86.2 kg)   Height: 5' 11\" (1.803 m) Patient was given thefollowing medications:  Medications - No data to display    Patient is nontoxic and in no apparent distress. Patient educated about pressure ulcers and how to care for them. Patient instructed to follow up with primary care doctor or return to ER for further concerns. Differential diagnoses include abscess, pressure ulcer, and  necrotizing fasciitis. FINAL IMPRESSION      1.  Pressure ulcer of both feet          DISPOSITION/PLAN   DISPOSITION        PATIENT REFERREDTO:  [facility],   [address]    Schedule an appointment as soon as possible for a visit in 2 days      [address]  [address] [address]  [phone]    If symptoms worsen      DISCHARGE MEDICATIONS:  Discharge Medication List as of 4/30/2019  5:40 PM          DISCONTINUED MEDICATIONS:  Discharge Medication List as of 4/30/2019  5:40 PM                 (Please note that portions ofthis note were completed with a voice recognition program.  Efforts were made to edit the dictations but occasionally words are mis-transcribed.)    JACQUE Stringer - CNP (electronically signed)            JACQUE Stringer - FR[place]  04/30/19 9014

## 2019-05-03 ENCOUNTER — OFFICE VISIT (OUTPATIENT)
Dept: FAMILY MEDICINE CLINIC | Age: 39
End: 2019-05-03
Payer: COMMERCIAL

## 2019-05-03 VITALS
HEIGHT: 71 IN | SYSTOLIC BLOOD PRESSURE: 122 MMHG | HEART RATE: 82 BPM | BODY MASS INDEX: 25.9 KG/M2 | DIASTOLIC BLOOD PRESSURE: 80 MMHG | OXYGEN SATURATION: 99 % | WEIGHT: 185 LBS

## 2019-05-03 DIAGNOSIS — J96.01 ACUTE RESPIRATORY FAILURE WITH HYPOXIA (HCC): ICD-10-CM

## 2019-05-03 DIAGNOSIS — J18.9 PNEUMONIA DUE TO INFECTIOUS ORGANISM, UNSPECIFIED LATERALITY, UNSPECIFIED PART OF LUNG: Primary | ICD-10-CM

## 2019-05-03 DIAGNOSIS — G93.40 ACUTE ENCEPHALOPATHY: ICD-10-CM

## 2019-05-03 PROBLEM — R45.1 AGITATION: Status: RESOLVED | Noted: 2019-04-16 | Resolved: 2019-05-03

## 2019-05-03 PROCEDURE — 1111F DSCHRG MED/CURRENT MED MERGE: CPT | Performed by: FAMILY MEDICINE

## 2019-05-03 PROCEDURE — G8419 CALC BMI OUT NRM PARAM NOF/U: HCPCS | Performed by: FAMILY MEDICINE

## 2019-05-03 PROCEDURE — G8427 DOCREV CUR MEDS BY ELIG CLIN: HCPCS | Performed by: FAMILY MEDICINE

## 2019-05-03 PROCEDURE — 1036F TOBACCO NON-USER: CPT | Performed by: FAMILY MEDICINE

## 2019-05-03 PROCEDURE — 99214 OFFICE O/P EST MOD 30 MIN: CPT | Performed by: FAMILY MEDICINE

## 2019-05-03 RX ORDER — NICOTINE 21 MG/24HR
1 PATCH, TRANSDERMAL 24 HOURS TRANSDERMAL EVERY 24 HOURS
Qty: 30 PATCH | Refills: 3 | Status: SHIPPED | OUTPATIENT
Start: 2019-05-03 | End: 2019-12-02

## 2019-05-03 RX ORDER — ALBUTEROL SULFATE 90 UG/1
AEROSOL, METERED RESPIRATORY (INHALATION)
COMMUNITY
Start: 2019-04-29 | End: 2019-12-02 | Stop reason: SDUPTHER

## 2019-05-03 RX ORDER — HYDROCODONE BITARTRATE AND ACETAMINOPHEN 5; 325 MG/1; MG/1
TABLET ORAL
COMMUNITY
Start: 2019-05-02 | End: 2019-12-02

## 2019-05-03 RX ORDER — AMOXICILLIN 500 MG/1
CAPSULE ORAL
COMMUNITY
Start: 2019-05-02 | End: 2019-12-02

## 2019-05-03 ASSESSMENT — ENCOUNTER SYMPTOMS
BLOOD IN STOOL: 0
CONSTIPATION: 0
SINUS PRESSURE: 0
RHINORRHEA: 0
WHEEZING: 0
ABDOMINAL PAIN: 0
DIARRHEA: 0
EYE PAIN: 0
VOMITING: 0
COUGH: 0
COLOR CHANGE: 0
CHEST TIGHTNESS: 0
EYE DISCHARGE: 0
SHORTNESS OF BREATH: 0

## 2019-05-03 ASSESSMENT — PATIENT HEALTH QUESTIONNAIRE - PHQ9
SUM OF ALL RESPONSES TO PHQ QUESTIONS 1-9: 0
1. LITTLE INTEREST OR PLEASURE IN DOING THINGS: 0
2. FEELING DOWN, DEPRESSED OR HOPELESS: 0
SUM OF ALL RESPONSES TO PHQ QUESTIONS 1-9: 0
SUM OF ALL RESPONSES TO PHQ9 QUESTIONS 1 & 2: 0

## 2019-05-03 NOTE — PROGRESS NOTES
was transferred to ICU for further care.       Diagnoses this Admission and Hospital Course      Acute Toxic? Encephalopathy  - suspect related to substance abuse based on patient's hx, yet etiology is not entirely clear  - UDS+ THC only - family reports pt being sober for 8 months   - head CT without acute findings  - initially admitted to medical floor but became severely agitated, code violet called, and transferred to ICU  - Multiple sedating meds administered, required intubation    - Now off vent. Mentation normal   - off all meds   - psych consulted-refused in patient at [facility]. Discharged home.      Acute Hypoxic Resp failure - Resolved  - intubated for airway protection  - pt required heavy sedation  - off vent now   - stable on RA      Acute Febrile illness  - likely aspiration PNA  - increased resp secretions  - s/p bronch. H. Influenza in resp cultures  - Now Merrem and Zyvox day #4, completed 5 days Cefepime and vancomycin which were stopped due to concern about possible drug fever.  Monitor platelets while on Zyvox. Finished antibiotics during hospital stay.     Diarrhea improved  - loose watery stools  - checked C diff- negative      Blistering Skin Reaction  - left shoulder and thigh region  - resolving     Hypokalemia  - replaced      Leukocytosis  - mild, monitored      Polysubstance abuse  - UDS + THC only today  - based on EMR- patient with prior fentanyl abuse      Procedures (Please Review Full Report for Details)  Endotracheal intubation  Bronchoscopy 4/19/19  The scope was passed with ease via the mouth.  The vocal cords looked normal. Topical lidocaine was used on the vocal cords and inside the bronchial tree. A complete airway inspection was performed.  No endobronchial lesions were identified.  There were thick, purulent secretions throughout the bilateral lower lobe airways.  Therapeutic aspiration was performed. Alissa Hayden were obtained throughout the airways.  A Bronchoalveolar lavage was obtained from the left lower lobe with good return.     Estimated blood loss <5ml.     Consults    Psychiatry  Pulmonology       Lab Review   Admission on 04/15/2019, Discharged on 04/27/2019   No results displayed because visit has over 200 results.       Admission on 04/13/2019, Discharged on 04/13/2019   Component Date Value    Color, UA 04/13/2019 Yellow     Clarity, UA 04/13/2019 Clear     Glucose, Ur 04/13/2019 Negative     Bilirubin Urine 04/13/2019 Negative     Ketones, Urine 04/13/2019 Negative     Specific Gravity, UA 04/13/2019 1.010     Blood, Urine 04/13/2019 SMALL*    pH, UA 04/13/2019 5.5     Protein, UA 04/13/2019 Negative     Urobilinogen, Urine 04/13/2019 0.2     Nitrite, Urine 04/13/2019 Negative     Leukocyte Esterase, Urine 04/13/2019 Negative     Microscopic Examination 04/13/2019 YES     Urine Reflex to Culture 04/13/2019 Not Indicated     Urine Type 04/13/2019 Not Specified     Amphetamine Screen, Urine 04/13/2019 Neg     Barbiturate Screen, Ur 04/13/2019 Neg     Benzodiazepine Screen, U* 04/13/2019 Neg     Cannabinoid Scrn, Ur 04/13/2019 POSITIVE*    Cocaine Metabolite Scree* 04/13/2019 Neg     Opiate Scrn, Ur 04/13/2019 Neg     PCP Screen, Urine 04/13/2019 Neg     Methadone Screen, Urine 04/13/2019 Neg     Propoxyphene Scrn, Ur 04/13/2019 Neg     pH, UA 04/13/2019 5.5     Drug Screen Comment: 04/13/2019 see below     Oxycodone Urine 04/13/2019 Neg     WBC, UA 04/13/2019 0-2     RBC, UA 04/13/2019 3-5*   Admission on 04/12/2019, Discharged on 04/13/2019   Component Date Value    WBC 04/12/2019 11.8*    RBC 04/12/2019 4.54     Hemoglobin 04/12/2019 14.0     Hematocrit 04/12/2019 41.4     MCV 04/12/2019 91.1     MCH 04/12/2019 30.9     MCHC 04/12/2019 33.9     RDW 04/12/2019 13.6     Platelets 73/89/5422 354     MPV 04/12/2019 6.8     Neutrophils % 04/12/2019 52.7     Lymphocytes % 04/12/2019 39.0     Monocytes % 04/12/2019 6.3     Eosinophils % 04/12/2019 1.2     Basophils % 04/12/2019 0.8     Neutrophils # 04/12/2019 6.2     Lymphocytes # 04/12/2019 4.6     Monocytes # 04/12/2019 0.7     Eosinophils # 04/12/2019 0.1     Basophils # 04/12/2019 0.1     Sodium 04/12/2019 135*    Potassium 04/12/2019 3.7     Chloride 04/12/2019 96*    CO2 04/12/2019 25     Anion Gap 04/12/2019 14     Glucose 04/12/2019 214*    BUN 04/12/2019 14     CREATININE 04/12/2019 1.2     GFR Non- 04/12/2019 >60     GFR  04/12/2019 >60     Calcium 04/12/2019 9.9     Total Protein 04/12/2019 7.5     Alb 04/12/2019 4.6     Albumin/Globulin Ratio 04/12/2019 1.6     Total Bilirubin 04/12/2019 0.5     Alkaline Phosphatase 04/12/2019 73     ALT 04/12/2019 33     AST 04/12/2019 24     Globulin 04/12/2019 2.9     Color, UA 04/12/2019 Yellow     Clarity, UA 04/12/2019 Clear     Glucose, Ur 04/12/2019 Negative     Bilirubin Urine 04/12/2019 Negative     Ketones, Urine 04/12/2019 Negative     Specific Gravity, UA 04/12/2019 >=1.030     Blood, Urine 04/12/2019 MODERATE*    pH, UA 04/12/2019 5.5     Protein, UA 04/12/2019 100*    Urobilinogen, Urine 04/12/2019 0.2     Nitrite, Urine 04/12/2019 Negative     Leukocyte Esterase, Urine 04/12/2019 Negative     Microscopic Examination 04/12/2019 YES     Urine Reflex to Culture 04/12/2019 Not Indicated     Urine Type 04/12/2019 Not Specified     Amphetamine Screen, Urine 04/12/2019 Neg     Barbiturate Screen, Ur 04/12/2019 POSITIVE*    Benzodiazepine Screen, U* 04/12/2019 Neg     Cannabinoid Scrn, Ur 04/12/2019 POSITIVE*    Cocaine Metabolite Scree* 04/12/2019 Neg     Opiate Scrn, Ur 04/12/2019 Neg     PCP Screen, Urine 04/12/2019 Neg     Methadone Screen, Urine 04/12/2019 Neg     Propoxyphene Scrn, Ur 04/12/2019 Neg     pH, UA 04/12/2019 5.5     Drug Screen Comment: 04/12/2019 see below     Oxycodone Urine 04/12/2019 Neg     Mucus, UA 04/12/2019 3+*    WBC, UA 04/12/2019 0-2     RBC, UA 04/12/2019 20-50*    Epi Cells 04/12/2019 0-2     Amorphous, UA 04/12/2019 2+*    Lactic Acid 04/12/2019 1.4     pH, Zeus 04/12/2019 7.428     pCO2, Zeus 04/12/2019 36.6*    pO2, Zeus 04/12/2019 92.9*    HCO3, Venous 04/12/2019 23.6     Base Excess, Zeus 04/12/2019 -0.3     O2 Sat, Zeus 04/12/2019 97     Carboxyhemoglobin 04/12/2019 3.2*    MetHgb, Zeus 04/12/2019 0.3     TC02 (Calc), Zeus 04/12/2019 25     O2 Content, Zeus 04/12/2019 18     O2 Therapy 04/12/2019 Unknown      Admission on 04/15/2019, Discharged on 04/27/2019   No results displayed because visit has over 200 results.       Admission on 04/13/2019, Discharged on 04/13/2019   Component Date Value    Color, UA 04/13/2019 Yellow     Clarity, UA 04/13/2019 Clear     Glucose, Ur 04/13/2019 Negative     Bilirubin Urine 04/13/2019 Negative     Ketones, Urine 04/13/2019 Negative     Specific Gravity, UA 04/13/2019 1.010     Blood, Urine 04/13/2019 SMALL*    pH, UA 04/13/2019 5.5     Protein, UA 04/13/2019 Negative     Urobilinogen, Urine 04/13/2019 0.2     Nitrite, Urine 04/13/2019 Negative     Leukocyte Esterase, Urine 04/13/2019 Negative     Microscopic Examination 04/13/2019 YES     Urine Reflex to Culture 04/13/2019 Not Indicated     Urine Type 04/13/2019 Not Specified     Amphetamine Screen, Urine 04/13/2019 Neg     Barbiturate Screen, Ur 04/13/2019 Neg     Benzodiazepine Screen, U* 04/13/2019 Neg     Cannabinoid Scrn, Ur 04/13/2019 POSITIVE*    Cocaine Metabolite Scree* 04/13/2019 Neg     Opiate Scrn, Ur 04/13/2019 Neg     PCP Screen, Urine 04/13/2019 Neg     Methadone Screen, Urine 04/13/2019 Neg     Propoxyphene Scrn, Ur 04/13/2019 Neg     pH, UA 04/13/2019 5.5     Drug Screen Comment: 04/13/2019 see below     Oxycodone Urine 04/13/2019 Neg     WBC, UA 04/13/2019 0-2     RBC, UA 04/13/2019 3-5*   Admission on 04/12/2019, Discharged on 04/13/2019   Component Date Value    WBC 04/12/2019 11.8*    RBC Metabolite Scree* 04/12/2019 Neg     Opiate Scrn, Ur 04/12/2019 Neg     PCP Screen, Urine 04/12/2019 Neg     Methadone Screen, Urine 04/12/2019 Neg     Propoxyphene Scrn, Ur 04/12/2019 Neg     pH, UA 04/12/2019 5.5     Drug Screen Comment: 04/12/2019 see below     Oxycodone Urine 04/12/2019 Neg     Mucus, UA 04/12/2019 3+*    WBC, UA 04/12/2019 0-2     RBC, UA 04/12/2019 20-50*    Epi Cells 04/12/2019 0-2     Amorphous, UA 04/12/2019 2+*    Lactic Acid 04/12/2019 1.4     pH, Zeus 04/12/2019 7.428     pCO2, Zeus 04/12/2019 36.6*    pO2, Zeus 04/12/2019 92.9*    HCO3, Venous 04/12/2019 23.6     Base Excess, Zeus 04/12/2019 -0.3     O2 Sat, Zeus 04/12/2019 97     Carboxyhemoglobin 04/12/2019 3.2*    MetHgb, Zeus 04/12/2019 0.3     TC02 (Calc), Zeus 04/12/2019 25     O2 Content, Zeus 04/12/2019 18     O2 Therapy 04/12/2019 Unknown    Admission on 01/20/2019, Discharged on 01/21/2019   Component Date Value    Sodium 01/20/2019 137     Potassium 01/20/2019 4.0     Chloride 01/20/2019 98*    CO2 01/20/2019 29     Anion Gap 01/20/2019 10     Glucose 01/20/2019 106*    BUN 01/20/2019 15     CREATININE 01/20/2019 1.0     GFR Non- 01/20/2019 >60     GFR  01/20/2019 >60     Calcium 01/20/2019 10.0     Total Protein 01/20/2019 7.3     Alb 01/20/2019 4.3     Albumin/Globulin Ratio 01/20/2019 1.4     Total Bilirubin 01/20/2019 <0.2     Alkaline Phosphatase 01/20/2019 60     ALT 01/20/2019 22     AST 01/20/2019 20     Globulin 01/20/2019 3.0     WBC 01/20/2019 7.8     RBC 01/20/2019 4.56     Hemoglobin 01/20/2019 14.4     Hematocrit 01/20/2019 41.9     MCV 01/20/2019 91.8     MCH 01/20/2019 31.7     MCHC 01/20/2019 34.5     RDW 01/20/2019 13.0     Platelets 68/44/8047 320     MPV 01/20/2019 6.7     Neutrophils % 01/20/2019 54.5     Lymphocytes % 01/20/2019 33.5     Monocytes % 01/20/2019 7.2     Eosinophils % 01/20/2019 3.4     Basophils % 01/20/2019 1.4     Neutrophils # 01/20/2019 4.3     Lymphocytes # 01/20/2019 2.6     Monocytes # 01/20/2019 0.6     Eosinophils # 01/20/2019 0.3     Basophils # 01/20/2019 0.1     Amylase 01/20/2019 55     Lipase 01/20/2019 23.0     Color, UA 01/20/2019 Yellow     Clarity, UA 01/20/2019 Clear     Glucose, Ur 01/20/2019 Negative     Bilirubin Urine 01/20/2019 Negative     Ketones, Urine 01/20/2019 Negative     Specific Gravity, UA 01/20/2019 >=1.030     Blood, Urine 01/20/2019 MODERATE*    pH, UA 01/20/2019 5.5     Protein, UA 01/20/2019 Negative     Urobilinogen, Urine 01/20/2019 0.2     Nitrite, Urine 01/20/2019 Negative     Leukocyte Esterase, Urine 01/20/2019 Negative     Microscopic Examination 01/20/2019 YES     Urine Type 01/20/2019 Not Specified     CASTS 2 01/20/2019 0-1 Hyaline*    Mucus, UA 01/20/2019 1+*    WBC, UA 01/20/2019 0-2     RBC, UA 01/20/2019 10-20*    Crystals 01/20/2019 Few Ca. Oxalate*    C. trachomatis DNA ,Urine 01/20/2019 Negative     N. gonorrhoeae DNA, Urine 01/20/2019 Negative        Review of Systems   Constitutional: Negative for fatigue, fever and unexpected weight change. HENT: Negative for congestion, ear discharge, ear pain, hearing loss, rhinorrhea and sinus pressure. Eyes: Negative for pain, discharge and visual disturbance. Respiratory: Negative for cough, chest tightness, shortness of breath and wheezing. Cardiovascular: Negative for chest pain, palpitations and leg swelling. Gastrointestinal: Negative for abdominal pain, blood in stool, constipation, diarrhea and vomiting. Genitourinary: Negative for difficulty urinating, dysuria and hematuria. Musculoskeletal: Negative for arthralgias and joint swelling. Skin: Negative for color change and rash. Neurological: Negative for dizziness, seizures, syncope and headaches. Hematological: Negative for adenopathy. Does not bruise/bleed easily.    Psychiatric/Behavioral: Negative for dysphoric mood and sleep disturbance. The patient is not nervous/anxious. Objective:   Physical Exam   Constitutional: He is oriented to person, place, and time. He appears well-developed and well-nourished. No distress. HENT:   Head: Normocephalic. Right Ear: External ear normal.   Left Ear: External ear normal.   Nose: Nose normal.   Mouth/Throat: Oropharynx is clear and moist. No oropharyngeal exudate. Eyes: Pupils are equal, round, and reactive to light. EOM are normal.   Neck: Neck supple. No thyromegaly present. Cardiovascular: Normal rate, regular rhythm, normal heart sounds and intact distal pulses. No murmur heard. Pulmonary/Chest: Effort normal and breath sounds normal. He has no wheezes. Abdominal: Soft. Bowel sounds are normal. He exhibits no distension. There is no tenderness. There is no rebound and no guarding. Musculoskeletal: Normal range of motion. Lymphadenopathy:     He has no cervical adenopathy. Neurological: He is alert and oriented to person, place, and time. Skin: Skin is warm. Psychiatric: He has a normal mood and affect. His behavior is normal. Judgment and thought content normal.       Assessment:      Pneumonia- finish abx, cxr in 4 weeks, rto 2 weeks  Acute resp failure  Acute encephalopathy  H/o drug abuse      Plan:      Reviewed [facility] records  reconcilled meds  Orders Placed This Encounter   Procedures    XR CHEST STANDARD (2 VW)     Standing Status:   Future     Standing Expiration Date:   7/3/2019     Order Specific Question:   Reason for exam:     Answer:   pneumonia     More than 50% of this 30 min patient encounter was spent counseling patient and family about current diagnoses and coordinating care.             Ursula WEBB DO

## 2019-05-03 NOTE — ED PROVIDER NOTES
I independently performed a history and physical on Francesca Call. All diagnostic, treatment, and disposition decisions were made by myself in conjunction with the advanced practice provider. For further details of [facility] emergency department encounter, please see the advance practice provider's documentation. In brief, this is a 45y.o. year old male, with   Chief Complaint   Patient presents with    Blister     pt. states he was recently admitted to the hospital for \"spesis and pneumonia\" states while he was in the hospital he developed a blister to his L heel and thinks it needs lanced. pt. denies other sx at this time and ambulates w/out difficulty. Patient recently had a prolonged stay in the hospital secondary to pneumonia and sepsis. Since getting home he has noticed blisters to his heels as well as several red spots on his body. On examination the patient has a clear fluid-filled blister to the left heel. He has several erythematous areas at pressure points. None of these appear acutely infected. Patient appears to have several areas of probable decubitus ulcer formation from his recent hospitalization. None of them require acute treatment. I am gone over signs and symptoms of acute wound infection with the patient and his family. They voiced their understanding. I want him to follow closely with his primary care physician for trending of these wounds.      Humble Colbert MD  05/03/19 1316

## 2019-12-02 ENCOUNTER — OFFICE VISIT (OUTPATIENT)
Dept: FAMILY MEDICINE CLINIC | Age: 39
End: 2019-12-02
Payer: COMMERCIAL

## 2019-12-02 VITALS
BODY MASS INDEX: 26.96 KG/M2 | HEART RATE: 87 BPM | DIASTOLIC BLOOD PRESSURE: 78 MMHG | SYSTOLIC BLOOD PRESSURE: 138 MMHG | OXYGEN SATURATION: 98 % | HEIGHT: 71 IN | WEIGHT: 192.6 LBS | TEMPERATURE: 98.7 F

## 2019-12-02 DIAGNOSIS — H26.101 TRAUMATIC CATARACT OF RIGHT EYE, UNSPECIFIED TRAUMATIC CATARACT TYPE: ICD-10-CM

## 2019-12-02 DIAGNOSIS — M51.36 DDD (DEGENERATIVE DISC DISEASE), LUMBAR: ICD-10-CM

## 2019-12-02 DIAGNOSIS — Z01.818 PREOP EXAMINATION: Primary | ICD-10-CM

## 2019-12-02 PROCEDURE — G8427 DOCREV CUR MEDS BY ELIG CLIN: HCPCS | Performed by: FAMILY MEDICINE

## 2019-12-02 PROCEDURE — 99242 OFF/OP CONSLTJ NEW/EST SF 20: CPT | Performed by: FAMILY MEDICINE

## 2019-12-02 PROCEDURE — G8419 CALC BMI OUT NRM PARAM NOF/U: HCPCS | Performed by: FAMILY MEDICINE

## 2019-12-02 PROCEDURE — G8484 FLU IMMUNIZE NO ADMIN: HCPCS | Performed by: FAMILY MEDICINE

## 2019-12-02 RX ORDER — ALBUTEROL SULFATE 90 UG/1
2 AEROSOL, METERED RESPIRATORY (INHALATION) EVERY 6 HOURS PRN
Qty: 1 INHALER | Refills: 5 | Status: SHIPPED | OUTPATIENT
Start: 2019-12-02 | End: 2020-09-11

## 2019-12-02 ASSESSMENT — ENCOUNTER SYMPTOMS
CHEST TIGHTNESS: 0
DIARRHEA: 0
RHINORRHEA: 0
CONSTIPATION: 0
WHEEZING: 0
SHORTNESS OF BREATH: 0
COUGH: 0
BLOOD IN STOOL: 0
EYE PAIN: 0
ABDOMINAL PAIN: 0
VOMITING: 0
EYE DISCHARGE: 0
SINUS PRESSURE: 0

## 2020-01-09 ENCOUNTER — TELEPHONE (OUTPATIENT)
Dept: FAMILY MEDICINE CLINIC | Age: 40
End: 2020-01-09

## 2020-01-09 ENCOUNTER — HOSPITAL ENCOUNTER (OUTPATIENT)
Dept: GENERAL RADIOLOGY | Age: 40
Discharge: HOME | End: 2020-01-09
Payer: COMMERCIAL

## 2020-01-09 ENCOUNTER — HOSPITAL ENCOUNTER (OUTPATIENT)
Age: 40
Discharge: HOME | End: 2020-01-09
Payer: COMMERCIAL

## 2020-01-09 PROCEDURE — 71046 X-RAY EXAM CHEST 2 VIEWS: CPT

## 2020-02-24 ENCOUNTER — OFFICE VISIT (OUTPATIENT)
Dept: FAMILY MEDICINE CLINIC | Age: 40
End: 2020-02-24
Payer: COMMERCIAL

## 2020-02-24 VITALS
WEIGHT: 196.4 LBS | SYSTOLIC BLOOD PRESSURE: 112 MMHG | HEART RATE: 74 BPM | DIASTOLIC BLOOD PRESSURE: 68 MMHG | HEIGHT: 71 IN | OXYGEN SATURATION: 99 % | BODY MASS INDEX: 27.5 KG/M2

## 2020-02-24 PROCEDURE — 1036F TOBACCO NON-USER: CPT | Performed by: FAMILY MEDICINE

## 2020-02-24 PROCEDURE — G8484 FLU IMMUNIZE NO ADMIN: HCPCS | Performed by: FAMILY MEDICINE

## 2020-02-24 PROCEDURE — 99213 OFFICE O/P EST LOW 20 MIN: CPT | Performed by: FAMILY MEDICINE

## 2020-02-24 PROCEDURE — G8427 DOCREV CUR MEDS BY ELIG CLIN: HCPCS | Performed by: FAMILY MEDICINE

## 2020-02-24 PROCEDURE — G8419 CALC BMI OUT NRM PARAM NOF/U: HCPCS | Performed by: FAMILY MEDICINE

## 2020-02-24 RX ORDER — AMOXICILLIN 500 MG/1
2 CAPSULE ORAL EVERY 8 HOURS PRN
COMMUNITY
Start: 2020-02-18 | End: 2020-09-11 | Stop reason: ALTCHOICE

## 2020-02-24 RX ORDER — CHLORHEXIDINE GLUCONATE 0.12 MG/ML
RINSE ORAL 2 TIMES DAILY
COMMUNITY
Start: 2019-12-27 | End: 2020-09-11 | Stop reason: ALTCHOICE

## 2020-02-24 RX ORDER — HYDROCODONE BITARTRATE AND ACETAMINOPHEN 5; 325 MG/1; MG/1
1 TABLET ORAL EVERY 6 HOURS PRN
COMMUNITY
Start: 2020-02-18 | End: 2020-09-11 | Stop reason: ALTCHOICE

## 2020-02-24 ASSESSMENT — ENCOUNTER SYMPTOMS
SHORTNESS OF BREATH: 0
CHEST TIGHTNESS: 0

## 2020-02-24 ASSESSMENT — PATIENT HEALTH QUESTIONNAIRE - PHQ9
SUM OF ALL RESPONSES TO PHQ QUESTIONS 1-9: 0
SUM OF ALL RESPONSES TO PHQ9 QUESTIONS 1 & 2: 0
SUM OF ALL RESPONSES TO PHQ QUESTIONS 1-9: 0
2. FEELING DOWN, DEPRESSED OR HOPELESS: 0
1. LITTLE INTEREST OR PLEASURE IN DOING THINGS: 0

## 2020-02-24 NOTE — PROGRESS NOTES
Subjective:      Patient ID: Ibeth Bishop is a 44 y.o. male. HPI  Chief Complaint   Patient presents with    Groin Pain     Patient is here with complaints of pain in his groin area on the left side. Pain began at least 3 months ago. Here for complaint of left-sided groin pain times several months. Has been doing a lot of exercise-set up's. But feels this pain in the groin area at times. Worried about a hernia. Has not seen a bulge. No swelling or redness in the area  Vitals:    02/24/20 1010   BP: 112/68   Pulse: 74   SpO2: 99%   Weight: 196 lb 6.4 oz (89.1 kg)   Height: 5' 11\" (1.803 m)     Body mass index is 27.39 kg/m². Wt Readings from Last 3 Encounters:   02/24/20 196 lb 6.4 oz (89.1 kg)   12/02/19 192 lb 9.6 oz (87.4 kg)   05/03/19 185 lb (83.9 kg)     BP Readings from Last 3 Encounters:   02/24/20 112/68   12/02/19 138/78   05/03/19 122/80        Review of Systems   Constitutional: Negative for chills and fever. Respiratory: Negative for chest tightness and shortness of breath. Cardiovascular: Negative for chest pain and palpitations. Objective:   Physical Exam  Constitutional:       General: He is not in acute distress. Appearance: He is well-developed. HENT:      Head: Normocephalic. Cardiovascular:      Rate and Rhythm: Normal rate and regular rhythm. Pulmonary:      Breath sounds: Normal breath sounds. No wheezing. Abdominal:      General: Bowel sounds are normal. There is no distension. Palpations: Abdomen is soft. Tenderness: There is no abdominal tenderness. There is no guarding or rebound. Hernia: A hernia is present. Comments: Left inguinal hernia felt when doing a sit up   Skin:     General: Skin is warm. Neurological:      Mental Status: He is oriented to person, place, and time.    Psychiatric:         Behavior: Behavior normal.         Assessment:      Left inguinal hernia- refer gen sx      Plan:      Orders Placed This Encounter Ana Rodriguez MD, [facility]     Referral Priority:   Routine     Referral Type:   Eval and Treat     Referral Reason:   Specialty Services Required     Referred to Provider:   Patsy Barrientos MD     Requested Specialty:   General Surgery     Number of Visits Requested:   1             JAKE Blanchard [address],

## 2020-09-11 ENCOUNTER — HOSPITAL ENCOUNTER (EMERGENCY)
Age: 40
Discharge: HOME | End: 2020-09-11
Attending: STUDENT IN AN ORGANIZED HEALTH CARE EDUCATION/TRAINING PROGRAM
Payer: COMMERCIAL

## 2020-09-11 ENCOUNTER — APPOINTMENT (OUTPATIENT)
Dept: CT IMAGING | Age: 40
End: 2020-09-11
Payer: COMMERCIAL

## 2020-09-11 VITALS
SYSTOLIC BLOOD PRESSURE: 129 MMHG | HEIGHT: 71 IN | WEIGHT: 190 LBS | RESPIRATION RATE: 16 BRPM | BODY MASS INDEX: 26.6 KG/M2 | OXYGEN SATURATION: 99 % | DIASTOLIC BLOOD PRESSURE: 79 MMHG | HEART RATE: 59 BPM | TEMPERATURE: 98.6 F

## 2020-09-11 PROCEDURE — 72125 CT NECK SPINE W/O DYE: CPT

## 2020-09-11 PROCEDURE — 99284 EMERGENCY DEPT VISIT MOD MDM: CPT

## 2020-09-11 RX ORDER — CYCLOBENZAPRINE HCL 10 MG
10 TABLET ORAL NIGHTLY PRN
Qty: 10 TABLET | Refills: 0 | Status: SHIPPED | OUTPATIENT
Start: 2020-09-11 | End: 2020-09-21

## 2020-09-11 ASSESSMENT — PAIN DESCRIPTION - ORIENTATION: ORIENTATION: RIGHT;LEFT

## 2020-09-11 ASSESSMENT — ENCOUNTER SYMPTOMS
COUGH: 0
NAUSEA: 0
ABDOMINAL PAIN: 0
DIARRHEA: 0
PHOTOPHOBIA: 0
RHINORRHEA: 0
BACK PAIN: 0
SORE THROAT: 0
VOMITING: 0
SHORTNESS OF BREATH: 0
CONSTIPATION: 0

## 2020-09-11 ASSESSMENT — PAIN DESCRIPTION - LOCATION: LOCATION: NECK

## 2020-09-11 ASSESSMENT — PAIN SCALES - GENERAL: PAINLEVEL_OUTOF10: 7
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
## 2021-02-10 ENCOUNTER — HOSPITAL ENCOUNTER (EMERGENCY)
Age: 41
Discharge: HOME | End: 2021-02-10
Attending: STUDENT IN AN ORGANIZED HEALTH CARE EDUCATION/TRAINING PROGRAM
Payer: COMMERCIAL

## 2021-02-10 ENCOUNTER — APPOINTMENT (OUTPATIENT)
Dept: GENERAL RADIOLOGY | Age: 41
End: 2021-02-10
Payer: COMMERCIAL

## 2021-02-10 VITALS
BODY MASS INDEX: 28 KG/M2 | RESPIRATION RATE: 16 BRPM | TEMPERATURE: 98.7 F | HEIGHT: 71 IN | WEIGHT: 200 LBS | OXYGEN SATURATION: 97 % | DIASTOLIC BLOOD PRESSURE: 87 MMHG | HEART RATE: 95 BPM | SYSTOLIC BLOOD PRESSURE: 141 MMHG
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
## 2021-02-10 PROCEDURE — 6370000000 HC RX 637 (ALT 250 FOR IP): Performed by: STUDENT IN AN ORGANIZED HEALTH CARE EDUCATION/TRAINING PROGRAM

## 2021-02-10 PROCEDURE — 99283 EMERGENCY DEPT VISIT LOW MDM: CPT

## 2021-02-10 PROCEDURE — 73140 X-RAY EXAM OF FINGER(S): CPT

## 2021-02-10 RX ORDER — NAPROXEN 500 MG/1
500 TABLET ORAL ONCE
Status: COMPLETED | OUTPATIENT
Start: 2021-02-10 | End: 2021-02-10

## 2021-02-10 RX ADMIN — NAPROXEN 500 MG: 500 TABLET ORAL at 01:43

## 2021-02-10 ASSESSMENT — PAIN DESCRIPTION - LOCATION: LOCATION: FINGER (COMMENT WHICH ONE)

## 2021-02-10 ASSESSMENT — PAIN SCALES - GENERAL
PAINLEVEL_OUTOF10: 5
PAINLEVEL_OUTOF10: 4

## 2021-02-10 NOTE — ED PROVIDER NOTES
Comment: Heroin, Fentanyl    Sexual activity: Yes     Partners: Female   Lifestyle    Physical activity     Days per week: Not on file     Minutes per session: Not on file    Stress: Not on file   Relationships    Social connections     Talks on phone: Not on file     Gets together: Not on file     Attends Yazidism service: Not on file     Active member of club or organization: Not on file     Attends meetings of clubs or organizations: Not on file     Relationship status: Not on file    Intimate partner violence     Fear of current or ex partner: Not on file     Emotionally abused: Not on file     Physically abused: Not on file     Forced sexual activity: Not on file   Other Topics Concern    Not on file   Social History Narrative    Not on file     No current facility-administered medications for this encounter. No current outpatient medications on file. No Known Allergies    REVIEW OF SYSTEMS  10 systems reviewed, pertinent positives per HPI otherwise noted to be negative. PHYSICAL EXAM  BP (!) 141/87   Pulse 95   Temp 98.7 °F (37.1 °C) (Oral)   Resp 16   Ht 5' 11\" (1.803 m)   Wt 200 lb (90.7 kg)   SpO2 97%   BMI 27.89 kg/m²    GENERAL APPEARANCE: Awake and alert. Cooperative. no distress. HENT: Normocephalic. Atraumatic. Mucous membranes are moist  NECK: Supple. Full range of motion of the neck without stiffness or pain. EYES: PERRL. EOM's grossly intact. HEART/CHEST: RRR. No murmurs. Chest wall is not tender to palpation. LUNGS: Respirations unlabored. CTAB. Good air exchange. Speaking comfortably in full sentences. ABDOMEN: No tenderness. Soft. Non-distended. No masses. No organomegaly. No guarding or rebound. MUSCULOSKELETAL: No extremity edema. Compartments soft. No deformity. No tenderness in the extremities. All extremities neurovascularly intact. Right upper extremity is neurovascular intact. Cardinal movements of the hand are intact.   Sensation over the radial, primary care doctor. Patient feels comfortable with discharge at this time. Return precautions given. Encouraged PCP follow-up in 2 days. Patient discharged in stable condition. CLINICAL IMPRESSION  1. Ring or other jewelry causing external constriction, initial encounter        Blood pressure (!) 141/87, pulse 95, temperature 98.7 °F (37.1 °C), temperature source Oral, resp. rate 16, height 5' 11\" (1.803 m), weight 200 lb (90.7 kg), SpO2 97 %. [address] [place] [address] Sherryle Mattes was discharged to home in stable condition. Patient was given scripts for the following medications. I counseled patient how to take these medications. There are no discharge medications for this patient. Follow-up with:  Chanda Garcia [address]  [phone]    Schedule an appointment as soon as possible for a visit in 2 days      [facility]  [address] [facility]  Go to   As needed, If symptoms worsen      DISCLAIMER: This chart was created using Dragon dictation software. Efforts were made by me to ensure accuracy, however some errors may be present due to limitations of this technology and occasionally words are not transcribed correctly.         Judah Hidalgo MD  02/10/21 2800

## 2021-02-24 ENCOUNTER — HOSPITAL ENCOUNTER (EMERGENCY)
Age: 41
Discharge: HOME | End: 2021-02-24
Attending: EMERGENCY MEDICINE
Payer: COMMERCIAL

## 2021-02-24 ENCOUNTER — APPOINTMENT (OUTPATIENT)
Dept: GENERAL RADIOLOGY | Age: 41
End: 2021-02-24
Payer: COMMERCIAL

## 2021-02-24 VITALS
SYSTOLIC BLOOD PRESSURE: 152 MMHG | DIASTOLIC BLOOD PRESSURE: 96 MMHG | OXYGEN SATURATION: 100 % | HEART RATE: 84 BPM | BODY MASS INDEX: 27.3 KG/M2 | TEMPERATURE: 97.9 F | WEIGHT: 195 LBS | RESPIRATION RATE: 18 BRPM | HEIGHT: 71 IN

## 2021-02-24 DIAGNOSIS — F11.93 OPIATE WITHDRAWAL (HCC): Primary | ICD-10-CM

## 2021-02-24 DIAGNOSIS — S80.811A ABRASION OF RIGHT LOWER LEG, INITIAL ENCOUNTER: ICD-10-CM

## 2021-02-24 DIAGNOSIS — K40.91 UNILATERAL RECURRENT INGUINAL HERNIA WITHOUT OBSTRUCTION OR GANGRENE: ICD-10-CM

## 2021-02-24 LAB
A/G RATIO: 1.7 (ref 1.1–2.2)
ALBUMIN SERPL-MCNC: 5.2 G/DL (ref 3.4–5)
ALP BLD-CCNC: 94 U/L (ref 40–129)
ALT SERPL-CCNC: 35 U/L (ref 10–40)
ANION GAP SERPL CALCULATED.3IONS-SCNC: 12 MMOL/L (ref 3–16)
AST SERPL-CCNC: 27 U/L (ref 15–37)
BASOPHILS ABSOLUTE: 0 K/UL (ref 0–0.2)
BASOPHILS RELATIVE PERCENT: 0.4 %
BILIRUB SERPL-MCNC: 0.3 MG/DL (ref 0–1)
BUN BLDV-MCNC: 12 MG/DL (ref 7–20)
CALCIUM SERPL-MCNC: 10.6 MG/DL (ref 8.3–10.6)
CHLORIDE BLD-SCNC: 97 MMOL/L (ref 99–110)
CO2: 26 MMOL/L (ref 21–32)
CREAT SERPL-MCNC: 0.8 MG/DL (ref 0.9–1.3)
EOSINOPHILS ABSOLUTE: 0 K/UL (ref 0–0.6)
EOSINOPHILS RELATIVE PERCENT: 0.2 %
GFR AFRICAN AMERICAN: >60
GFR NON-AFRICAN AMERICAN: >60
GLOBULIN: 3 G/DL
GLUCOSE BLD-MCNC: 83 MG/DL (ref 70–99)
HCT VFR BLD CALC: 42.6 % (ref 40.5–52.5)
HEMOGLOBIN: 14.5 G/DL (ref 13.5–17.5)
LIPASE: 55 U/L (ref 13–60)
LYMPHOCYTES ABSOLUTE: 1 K/UL (ref 1–5.1)
LYMPHOCYTES RELATIVE PERCENT: 13.4 %
MAGNESIUM: 2.2 MG/DL (ref 1.8–2.4)
MCH RBC QN AUTO: 31.5 PG (ref 26–34)
MCHC RBC AUTO-ENTMCNC: 34 G/DL (ref 31–36)
MCV RBC AUTO: 92.6 FL (ref 80–100)
MONOCYTES ABSOLUTE: 0.4 K/UL (ref 0–1.3)
MONOCYTES RELATIVE PERCENT: 6.2 %
NEUTROPHILS ABSOLUTE: 5.7 K/UL (ref 1.7–7.7)
NEUTROPHILS RELATIVE PERCENT: 79.8 %
PDW BLD-RTO: 12.4 % (ref 12.4–15.4)
PLATELET # BLD: 410 K/UL (ref 135–450)
PMV BLD AUTO: 6.6 FL (ref 5–10.5)
POTASSIUM REFLEX MAGNESIUM: 3.5 MMOL/L (ref 3.5–5.1)
RBC # BLD: 4.6 M/UL (ref 4.2–5.9)
SODIUM BLD-SCNC: 135 MMOL/L (ref 136–145)
TOTAL PROTEIN: 8.2 G/DL (ref 6.4–8.2)
WBC # BLD: 7.2 K/UL (ref 4–11)

## 2021-02-24 PROCEDURE — 6370000000 HC RX 637 (ALT 250 FOR IP): Performed by: EMERGENCY MEDICINE

## 2021-02-24 PROCEDURE — 73590 X-RAY EXAM OF LOWER LEG: CPT

## 2021-02-24 PROCEDURE — 80053 COMPREHEN METABOLIC PANEL: CPT

## 2021-02-24 PROCEDURE — 2580000003 HC RX 258: Performed by: EMERGENCY MEDICINE

## 2021-02-24 PROCEDURE — 2500000003 HC RX 250 WO HCPCS: Performed by: EMERGENCY MEDICINE

## 2021-02-24 PROCEDURE — 85025 COMPLETE CBC W/AUTO DIFF WBC: CPT

## 2021-02-24 PROCEDURE — 99284 EMERGENCY DEPT VISIT MOD MDM: CPT

## 2021-02-24 PROCEDURE — 96374 THER/PROPH/DIAG INJ IV PUSH: CPT

## 2021-02-24 PROCEDURE — 83735 ASSAY OF MAGNESIUM: CPT

## 2021-02-24 PROCEDURE — 90715 TDAP VACCINE 7 YRS/> IM: CPT | Performed by: EMERGENCY MEDICINE

## 2021-02-24 PROCEDURE — 6360000002 HC RX W HCPCS: Performed by: EMERGENCY MEDICINE

## 2021-02-24 PROCEDURE — 96375 TX/PRO/DX INJ NEW DRUG ADDON: CPT

## 2021-02-24 PROCEDURE — 90471 IMMUNIZATION ADMIN: CPT | Performed by: EMERGENCY MEDICINE

## 2021-02-24 PROCEDURE — 83690 ASSAY OF LIPASE: CPT

## 2021-02-24 RX ORDER — GINSENG 100 MG
CAPSULE ORAL ONCE
Status: COMPLETED | OUTPATIENT
Start: 2021-02-24 | End: 2021-02-24

## 2021-02-24 RX ORDER — ONDANSETRON 2 MG/ML
4 INJECTION INTRAMUSCULAR; INTRAVENOUS ONCE
Status: COMPLETED | OUTPATIENT
Start: 2021-02-24 | End: 2021-02-24

## 2021-02-24 RX ORDER — 0.9 % SODIUM CHLORIDE 0.9 %
1000 INTRAVENOUS SOLUTION INTRAVENOUS ONCE
Status: COMPLETED | OUTPATIENT
Start: 2021-02-24 | End: 2021-02-24

## 2021-02-24 RX ORDER — KETOROLAC TROMETHAMINE 30 MG/ML
30 INJECTION, SOLUTION INTRAMUSCULAR; INTRAVENOUS ONCE
Status: COMPLETED | OUTPATIENT
Start: 2021-02-24 | End: 2021-02-24

## 2021-02-24 RX ORDER — CLONIDINE HYDROCHLORIDE 0.1 MG/1
0.1 TABLET ORAL EVERY 12 HOURS PRN
Qty: 20 TABLET | Refills: 3 | Status: SHIPPED | OUTPATIENT
Start: 2021-02-24

## 2021-02-24 RX ORDER — CLONIDINE HYDROCHLORIDE 0.1 MG/1
0.1 TABLET ORAL ONCE
Status: COMPLETED | OUTPATIENT
Start: 2021-02-24 | End: 2021-02-24

## 2021-02-24 RX ADMIN — BACITRACIN: 500 OINTMENT TOPICAL at 15:12

## 2021-02-24 RX ADMIN — FAMOTIDINE 20 MG: 10 INJECTION, SOLUTION INTRAVENOUS at 15:21

## 2021-02-24 RX ADMIN — TETANUS TOXOID, REDUCED DIPHTHERIA TOXOID AND ACELLULAR PERTUSSIS VACCINE, ADSORBED 0.5 ML: 5; 2.5; 8; 8; 2.5 SUSPENSION INTRAMUSCULAR at 15:11

## 2021-02-24 RX ADMIN — ONDANSETRON 4 MG: 2 INJECTION INTRAMUSCULAR; INTRAVENOUS at 15:10

## 2021-02-24 RX ADMIN — CLONIDINE HYDROCHLORIDE 0.1 MG: 0.1 TABLET ORAL at 15:11

## 2021-02-24 RX ADMIN — SODIUM CHLORIDE 1000 ML: 9 INJECTION, SOLUTION INTRAVENOUS at 15:12

## 2021-02-24 RX ADMIN — KETOROLAC TROMETHAMINE 30 MG: 30 INJECTION, SOLUTION INTRAMUSCULAR at 15:11

## 2021-02-24 ASSESSMENT — PAIN SCALES - GENERAL: PAINLEVEL_OUTOF10: 10

## 2021-02-24 ASSESSMENT — PAIN DESCRIPTION - FREQUENCY: FREQUENCY: CONTINUOUS

## 2021-02-24 ASSESSMENT — PAIN DESCRIPTION - PAIN TYPE: TYPE: ACUTE PAIN

## 2021-02-24 ASSESSMENT — PAIN DESCRIPTION - LOCATION: LOCATION: ANKLE

## 2021-02-24 ASSESSMENT — PAIN DESCRIPTION - DESCRIPTORS: DESCRIPTORS: TIGHTNESS

## 2021-02-24 NOTE — ED PROVIDER NOTES
Cigarettes    Smokeless tobacco: Never Used   Substance and Sexual Activity    Alcohol use: No    Drug use: Not Currently     Frequency: 3.0 times per week     Types: Marijuana, Opiates , Methamphetamines     Comment: Heroin, Fentanyl    Sexual activity: Yes     Partners: Female   Lifestyle    Physical activity     Days per week: Not on file     Minutes per session: Not on file    Stress: Not on file   Relationships    Social connections     Talks on phone: Not on file     Gets together: Not on file     Attends Yarsani service: Not on file     Active member of club or organization: Not on file     Attends meetings of clubs or organizations: Not on file     Relationship status: Not on file    Intimate partner violence     Fear of current or ex partner: Not on file     Emotionally abused: Not on file     Physically abused: Not on file     Forced sexual activity: Not on file   Other Topics Concern    Not on file   Social History Narrative    Not on file     Current Facility-Administered Medications   Medication Dose Route Frequency Provider Last Rate Last Admin    0.9 % sodium chloride bolus  1,000 mL Intravenous Once Crista Thompson MD 1,000 mL/hr at 02/24/21 1512 1,000 mL at 02/24/21 1512     Current Outpatient Medications   Medication Sig Dispense Refill    cloNIDine (CATAPRES) 0.1 MG tablet Take 1 tablet by mouth every 12 hours as needed (Opiate withdrawal) 20 tablet 3     No Known Allergies    REVIEW OF SYSTEMS  10 systems reviewed, pertinent positives per HPI otherwise noted to be negative. PHYSICAL EXAM  BP (!) 147/114   Pulse 97   Temp 97.9 °F (36.6 °C) (Oral)   Resp 20   Ht 5' 11\" (1.803 m)   Wt 195 lb (88.5 kg)   SpO2 100%   BMI 27.20 kg/m²   GENERAL APPEARANCE: Awake and alert. Cooperative. Appears anxious and somewhat listless. HEAD: Normocephalic. Atraumatic. EYES: PERRL. EOM's grossly intact. ENT: Mucous membranes are moist.   NECK: Supple, trachea midline. HEART: RRR. Normal S1S2, no rubs, gallops, or murmurs noted  LUNGS: Respirations unlabored. CTAB. Good air exchange. No wheezes, rales, or rhonchi. Speaking comfortably in full sentences. ABDOMEN: Soft. Non-distended. Non-tender. No guarding or rebound. Normal bowel sounds. EXTREMITIES: No peripheral edema. Right mid pretibial region with 2 linear abrasions with some scabbing. No drainage or surrounding erythema. Tenderness to palpation. Left ankle with court ordered monitoring device affixed. SKIN: Warm and dry. No acute rashes. NEUROLOGICAL: Alert and oriented X 3. CN II-XII intact. No gross facial drooping. Strength 5/5, sensation intact. Normal coordination. No pronator drift. PSYCHIATRIC: Anxious. LABS  I have reviewed all labs for this visit.    Results for orders placed or performed during the hospital encounter of 02/24/21   CBC Auto Differential   Result Value Ref Range    WBC 7.2 4.0 - 11.0 K/uL    RBC 4.60 4.20 - 5.90 M/uL    Hemoglobin 14.5 13.5 - 17.5 g/dL    Hematocrit 42.6 40.5 - 52.5 %    MCV 92.6 80.0 - 100.0 fL    MCH 31.5 26.0 - 34.0 pg    MCHC 34.0 31.0 - 36.0 g/dL    RDW 12.4 12.4 - 15.4 %    Platelets 347 599 - 671 K/uL    MPV 6.6 5.0 - 10.5 fL    Neutrophils % 79.8 %    Lymphocytes % 13.4 %    Monocytes % 6.2 %    Eosinophils % 0.2 %    Basophils % 0.4 %    Neutrophils Absolute 5.7 1.7 - 7.7 K/uL    Lymphocytes Absolute 1.0 1.0 - 5.1 K/uL    Monocytes Absolute 0.4 0.0 - 1.3 K/uL    Eosinophils Absolute 0.0 0.0 - 0.6 K/uL    Basophils Absolute 0.0 0.0 - 0.2 K/uL   Comprehensive Metabolic Panel w/ Reflex to MG   Result Value Ref Range    Sodium 135 (L) 136 - 145 mmol/L    Potassium reflex Magnesium 3.5 3.5 - 5.1 mmol/L    Chloride 97 (L) 99 - 110 mmol/L    CO2 26 21 - 32 mmol/L    Anion Gap 12 3 - 16    Glucose 83 70 - 99 mg/dL    BUN 12 7 - 20 mg/dL    CREATININE 0.8 (L) 0.9 - 1.3 mg/dL    GFR Non-African American >60 >60    GFR African American >60 >60    Calcium 10.6 8.3 - 10.6 mg/dL Total Protein 8.2 6.4 - 8.2 g/dL    Albumin 5.2 (H) 3.4 - 5.0 g/dL    Albumin/Globulin Ratio 1.7 1.1 - 2.2    Total Bilirubin 0.3 0.0 - 1.0 mg/dL    Alkaline Phosphatase 94 40 - 129 U/L    ALT 35 10 - 40 U/L    AST 27 15 - 37 U/L    Globulin 3.0 g/dL   Lipase   Result Value Ref Range    Lipase 55.0 13.0 - 60.0 U/L   Magnesium   Result Value Ref Range    Magnesium 2.20 1.80 - 2.40 mg/dL           RADIOLOGY  X-RAYS:  I have reviewed radiologic plain film image(s). ALL OTHER NON-PLAIN FILM IMAGES SUCH AS CT, ULTRASOUND AND MRI HAVE BEEN READ BY THE RADIOLOGIST. XR TIBIA FIBULA RIGHT (2 VIEWS)   Final Result   No acute osseous or soft tissue abnormality of the right tib-fib. Rechecks: Physical assessment performed. Feeling improved on reevaluation. ED COURSE/MDM  Patient seen and evaluated. Old records reviewed. Labs and imaging reviewed and results discussed with patient. Patient with symptoms of opioid withdrawal including nausea and diarrhea. Labs unremarkable and he is nontoxic on evaluation. Abdomen is soft and nondistended. Plan for elective outpatient follow-up regarding his left inguinal hernia. Small prescription for clonidine to get him through the rest of his opiate withdrawal.  Discussed wound care regarding the abrasion over his right pretibial region. Patient was given scripts for the following medications. I counseled patient how to take these medications. New Prescriptions    CLONIDINE (CATAPRES) 0.1 MG TABLET    Take 1 tablet by mouth every 12 hours as needed (Opiate withdrawal)       CLINICAL IMPRESSION  1. Opiate withdrawal ([address]    2. Unilateral recurrent inguinal hernia without obstruction or gangrene    3. Abrasion of right lower leg, initial encounter        Blood pressure (!) 147/114, pulse 97, temperature 97.9 °F (36.6 °C), temperature source Oral, resp. rate 20, height 5' 11\" (1.803 m), weight 195 lb (88.5 kg), SpO2 100 %.     [address] [facility] Bebe Brumfield was discharged to home in stable condition.       Sommer Hawkins MD  02/24/21 4276

## 2021-02-24 NOTE — ED NOTES
Cleansed/ irrigated pt wound on right leg with NS and dynex. Then applied some triple antibiotic ointment, pt verbalized comfort.  ANABEL Schwartz aware     [facility]  02/24/21 0059

## 2021-12-21 ENCOUNTER — HOSPITAL ENCOUNTER (EMERGENCY)
Age: 41
Discharge: HOME | End: 2021-12-21
Payer: COMMERCIAL

## 2021-12-21 VITALS
SYSTOLIC BLOOD PRESSURE: 125 MMHG | RESPIRATION RATE: 18 BRPM | OXYGEN SATURATION: 97 % | TEMPERATURE: 98 F | DIASTOLIC BLOOD PRESSURE: 86 MMHG | HEART RATE: 63 BPM

## 2021-12-21 DIAGNOSIS — K08.89 PAIN, DENTAL: Primary | ICD-10-CM

## 2021-12-21 PROCEDURE — 6370000000 HC RX 637 (ALT 250 FOR IP): Performed by: PHYSICIAN ASSISTANT

## 2021-12-21 PROCEDURE — 99283 EMERGENCY DEPT VISIT LOW MDM: CPT

## 2021-12-21 RX ORDER — KETOROLAC TROMETHAMINE 10 MG/1
10 TABLET, FILM COATED ORAL ONCE
Status: COMPLETED | OUTPATIENT
Start: 2021-12-21 | End: 2021-12-21

## 2021-12-21 RX ORDER — CLINDAMYCIN HYDROCHLORIDE 300 MG/1
300 CAPSULE ORAL 2 TIMES DAILY
Qty: 14 CAPSULE | Refills: 0 | Status: SHIPPED | OUTPATIENT
Start: 2021-12-21 | End: 2021-12-28

## 2021-12-21 RX ORDER — KETOROLAC TROMETHAMINE 10 MG/1
10 TABLET, FILM COATED ORAL EVERY 6 HOURS PRN
Qty: 20 TABLET | Refills: 0 | Status: SHIPPED | OUTPATIENT
Start: 2021-12-21 | End: 2022-12-21

## 2021-12-21 RX ORDER — CLINDAMYCIN HYDROCHLORIDE 150 MG/1
300 CAPSULE ORAL ONCE
Status: COMPLETED | OUTPATIENT
Start: 2021-12-21 | End: 2021-12-21

## 2021-12-21 RX ADMIN — CLINDAMYCIN HYDROCHLORIDE 300 MG: 150 CAPSULE ORAL at 13:30

## 2021-12-21 RX ADMIN — KETOROLAC TROMETHAMINE 10 MG: 10 TABLET, FILM COATED ORAL at 13:30

## 2021-12-21 ASSESSMENT — PAIN SCALES - GENERAL
PAINLEVEL_OUTOF10: 10
PAINLEVEL_OUTOF10: 10

## 2021-12-21 NOTE — Clinical Note
Radha Babatunde was seen and treated in our emergency department on 12/21/2021. He may return to work on 12/24/2021. If you have any questions or concerns, please don't hesitate to call.       AKIRA Solis

## 2021-12-21 NOTE — ED PROVIDER NOTES
[facility] Emergency Department    CHIEF COMPLAINT  Dental Pain (had \"an infection in my two of my teeth\" and had them pulled. Pt reports still having pain and called the dentist and they said they would not be open so he needed to go to the ED for evaluation if he thinks he needs abx or pain medications. )      SHARED SERVICE VISIT:  Evaluated by RADHA. My supervising physician was available for consultation. HISTORY OF PRESENT ILLNESS  Amber Murcia is a 39 y.o. male who presents to the ED complaining of several day history of dental pain. Patient reports that he had tooth pulled and some crowns placed. Has had pain waxing and waning since. Attempted to call dentist today who reports that they will be closing this afternoon until after [place]. Patient reports pain is throbbing aching exacerbated by touch and movement. He denies fevers chills. No headaches. No sore throat or difficulty swallowing. No other complaints, modifying factors or associated symptoms. Nursing notes reviewed.    Past Medical History:   Diagnosis Date    Asthma     Back injury     Hypertension     Polysubstance abuse ([facility] [address]      Past Surgical History:   Procedure Laterality Date    BACK SURGERY  2000    KNEE SURGERY  2001    right knee     Family History   Problem Relation Age of Onset    High Blood Pressure Mother     High Blood Pressure Father     Bipolar Disorder Father      Social History     Socioeconomic History    Marital status: Single     Spouse name: Not on file    Number of children: Not on file    Years of education: Not on file    Highest education level: Not on file   Occupational History    Not on file   Tobacco Use    Smoking status: Current Every Day Smoker     Packs/day: 0.50     Years: 3.00     Pack years: 1.50     Types: Cigarettes    Smokeless tobacco: Never Used   Vaping Use    Vaping Use: Every day    Substances: Always   Substance and Sexual Activity    Alcohol use: No    Drug use: Not Currently     Frequency: 3.0 times per week     Types: Marijuana (Sandrine Paddock), Opiates , Methamphetamines (Crystal Meth)     Comment: Heroin, Fentanyl    Sexual activity: Yes     Partners: Female   Other Topics Concern    Not on file   Social History Narrative    Not on file     Social Determinants of Health     Financial Resource Strain:     Difficulty of Paying Living Expenses: Not on file   Food Insecurity:     Worried About Running Out of Food in the Last Year: Not on file    Gabriel of Food in the Last Year: Not on file   Transportation Needs:     Lack of Transportation (Medical): Not on file    Lack of Transportation (Non-Medical): Not on file   Physical Activity:     Days of Exercise per Week: Not on file    Minutes of Exercise per Session: Not on file   Stress:     Feeling of Stress : Not on file   Social Connections:     Frequency of Communication with Friends and Family: Not on file    Frequency of Social Gatherings with Friends and Family: Not on file    Attends Sabianism Services: Not on file    Active Member of [address] [place] [address] or Organizations: Not on file    Attends Club or Organization Meetings: Not on file    Marital Status: Not on file   Intimate Partner Violence:     Fear of Current or Ex-Partner: Not on file    Emotionally Abused: Not on file    Physically Abused: Not on file    Sexually Abused: Not on file   Housing Stability:     Unable to Pay for Housing in the Last Year: Not on file    Number of [place] in the Last Year: Not on file    Unstable Housing in the Last Year: Not on file     No current facility-administered medications for this encounter.      Current Outpatient Medications   Medication Sig Dispense Refill    clindamycin (CLEOCIN) 300 MG capsule Take 1 capsule by mouth 2 times daily for 7 days 14 capsule 0    ketorolac (TORADOL) 10 MG tablet Take 1 tablet by mouth every 6 hours as needed for Pain 20 tablet 0    cloNIDine (CATAPRES) 0.1 MG tablet Take 1 tablet by mouth every 12 hours as needed (Opiate withdrawal) 20 tablet 3     No Known Allergies    REVIEW OF SYSTEMS  6 systems reviewed, pertinent positives per HPI otherwise noted to be negative    PHYSICAL EXAM  /86   Pulse 63   Temp 98 °F (36.7 °C) (Oral)   Resp 18   SpO2 97%   GENERAL APPEARANCE: Awake and alert. Cooperative. No acute distress. HEAD: Normocephalic. Atraumatic. EYES: PERRL. EOM's grossly intact. ENT: Mucous membranes are moist.  Patient points to right lower gumline with 2 crowns and reports some tenderness to those teeth. The gumlines are without fluctuance, induration or drainage. What appears to be recent tooth extraction the left upper gumline this appears to be healing appropriately without evidence for dry socket or infectious process at this site. There is no TMJ pain or malocclusion. No vesicles, blistering or sloughing. Floor the mouth soft and nonraised and patient controlling secretions appropriately. LYMPH: No periauricular, submental, or cervical lymphadenopathy. NECK: Supple. Normal ROM. No tracheal tenderness or deviation. No crepitus. CHEST: Equal symmetric chest rise. LUNGS: Breathing is unlabored. Speaking comfortably in full sentences. Abdomen: Nondistended  EXTREMITIES: MAEE. No acute deformities. SKIN: Warm and dry. NEUROLOGICAL: Alert and oriented. Strength is 5/5 in all extremities and sensation is intact. ED COURSE   Patient received Toradol for pain, with good relief. Patient was initiated on clindamycin. He states that he does have dental follow-up scheduled for after the 1st of the year. Discussed recommendations to follow-up more quickly. Will be discharged on oral antibiotics with return precautions. Patient is in agreement and comfortable at discharge. A discussion was had with Mr. Franklin Rendon regarding dental pain, ED findings and recommendations for follow-up.   Risk management discussed and shared decision making had with patient and/or surrogate. All questions were answered. Patient will follow up with his dentist as soon as possible for further evaluation/treatment. Patient will return to ED for new/worsening symptoms. Patient was sent home with a prescription for clindamycin and Toradol. MDM  I estimate there is LOW risk for a ANAPHYLAXIS, DEEP SPACE INFECTION (e.g., DAKOTAS ANGINA OR RETROPHARYNGEAL ABSCESS), EPIGLOTTITIS, MENINGITIS, or AIRWAY COMPROMISE, thus I consider the discharge disposition reasonable. Also, there is no evidence or peritonitis, sepsis, or toxicity. Sid Gale and I have discussed the diagnosis and risks, and we agree with discharging home to follow-up with their primary doctor. We also discussed returning to the Emergency Department immediately if new or worsening symptoms occur. We have discussed the symptoms which are most concerning (e.g., changing or worsening pain, trouble swallowing or breathing, neck stiffness or fever) that necessitate immediate return. Final Impression  1. Pain, dental      Discharge Vital Signs:  Blood pressure 125/86, pulse 63, temperature 98 °F (36.7 °C), temperature source Oral, resp. rate 18, SpO2 97 %. DISPOSITION  Patient was discharged to home in good condition.            Joao Vaughan [facility]  12/21/21 [id number]

## 2021-12-21 NOTE — ED NOTES
Discharge paperwork given to and reviewed with pt. Pt verbalized understanding and all questions answered. Pt encouraged to return if having worsening symptoms or new symptoms discussed in discharge paperwork. Pt to follow up with PCP or dentist  Rx x 2 given and medications reviewed with pt. Pt in NAD, RR even and unlabored.  Pt off unit ambulatory      [facility]  12/21/21 8013

## 2021-12-21 NOTE — Clinical Note
Columba Morales was seen and treated in our emergency department on 12/21/2021. He may return to work on 12/24/2021. If you have any questions or concerns, please don't hesitate to call.       AKIRA Epstein

## 2022-11-02 NOTE — ED NOTES
Dr. Crispin Montalvo at bedside.      Paul Clark RN  02/24/21 6030 Message sent to patient through my [employer]. Future labs ordered.

## 2025-09-02 ENCOUNTER — HOSPITAL ENCOUNTER (EMERGENCY)
Age: 45
Discharge: HOME | End: 2025-09-02
Attending: EMERGENCY MEDICINE
Payer: COMMERCIAL

## 2025-09-02 ENCOUNTER — APPOINTMENT (OUTPATIENT)
Dept: GENERAL RADIOLOGY | Age: 45
End: 2025-09-02
Payer: COMMERCIAL

## 2025-09-02 VITALS
SYSTOLIC BLOOD PRESSURE: 165 MMHG | OXYGEN SATURATION: 99 % | WEIGHT: 195 LBS | HEIGHT: 71 IN | TEMPERATURE: 98.1 F | BODY MASS INDEX: 27.3 KG/M2 | HEART RATE: 77 BPM | DIASTOLIC BLOOD PRESSURE: 75 MMHG | RESPIRATION RATE: 16 BRPM
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
## 2025-09-02 DIAGNOSIS — S51.811A LACERATION OF RIGHT FOREARM, INITIAL ENCOUNTER: ICD-10-CM

## 2025-09-02 DIAGNOSIS — S52.501A CLOSED FRACTURE OF DISTAL END OF RIGHT RADIUS, UNSPECIFIED FRACTURE MORPHOLOGY, INITIAL ENCOUNTER: ICD-10-CM

## 2025-09-02 PROCEDURE — 73130 X-RAY EXAM OF HAND: CPT

## 2025-09-02 PROCEDURE — 29125 APPL SHORT ARM SPLINT STATIC: CPT

## 2025-09-02 PROCEDURE — 73110 X-RAY EXAM OF WRIST: CPT

## 2025-09-02 PROCEDURE — 73080 X-RAY EXAM OF ELBOW: CPT

## 2025-09-02 PROCEDURE — 73090 X-RAY EXAM OF FOREARM: CPT

## 2025-09-02 PROCEDURE — 96372 THER/PROPH/DIAG INJ SC/IM: CPT

## 2025-09-02 PROCEDURE — 6360000002 HC RX W HCPCS: Performed by: EMERGENCY MEDICINE

## 2025-09-02 PROCEDURE — 99284 EMERGENCY DEPT VISIT MOD MDM: CPT

## 2025-09-02 RX ORDER — LIDOCAINE HYDROCHLORIDE AND EPINEPHRINE 10; 10 MG/ML; UG/ML
20 INJECTION, SOLUTION INFILTRATION; PERINEURAL ONCE
Status: DISCONTINUED | OUTPATIENT
Start: 2025-09-02 | End: 2025-09-02 | Stop reason: HOSPADM

## 2025-09-02 RX ORDER — ONDANSETRON 4 MG/1
4 TABLET, ORALLY DISINTEGRATING ORAL 3 TIMES DAILY PRN
Qty: 21 TABLET | Refills: 0 | Status: SHIPPED | OUTPATIENT
Start: 2025-09-02

## 2025-09-02 RX ORDER — HYDROCODONE BITARTRATE AND ACETAMINOPHEN 5; 325 MG/1; MG/1
1 TABLET ORAL EVERY 4 HOURS PRN
Qty: 18 TABLET | Refills: 0 | Status: SHIPPED | OUTPATIENT
Start: 2025-09-02 | End: 2025-09-05

## 2025-09-02 RX ADMIN — HYDROMORPHONE HYDROCHLORIDE 1 MG: 1 INJECTION, SOLUTION INTRAMUSCULAR; INTRAVENOUS; SUBCUTANEOUS at 17:54

## 2025-09-02 ASSESSMENT — LIFESTYLE VARIABLES
HOW OFTEN DO YOU HAVE A DRINK CONTAINING ALCOHOL: NEVER
HOW MANY STANDARD DRINKS CONTAINING ALCOHOL DO YOU HAVE ON A TYPICAL DAY: PATIENT DOES NOT DRINK

## 2025-09-02 ASSESSMENT — PAIN DESCRIPTION - LOCATION
LOCATION: ARM;HAND
LOCATION: ARM

## 2025-09-02 ASSESSMENT — PAIN - FUNCTIONAL ASSESSMENT
PAIN_FUNCTIONAL_ASSESSMENT: 0-10
PAIN_FUNCTIONAL_ASSESSMENT: 0-10

## 2025-09-02 ASSESSMENT — PAIN DESCRIPTION - DESCRIPTORS: DESCRIPTORS: DISCOMFORT

## 2025-09-02 ASSESSMENT — PAIN SCALES - GENERAL
PAINLEVEL_OUTOF10: 10
PAINLEVEL_OUTOF10: 8
PAINLEVEL_OUTOF10: 8